# Patient Record
Sex: FEMALE | Race: WHITE | NOT HISPANIC OR LATINO | Employment: FULL TIME | URBAN - METROPOLITAN AREA
[De-identification: names, ages, dates, MRNs, and addresses within clinical notes are randomized per-mention and may not be internally consistent; named-entity substitution may affect disease eponyms.]

---

## 2019-12-09 ENCOUNTER — OFFICE VISIT (OUTPATIENT)
Dept: OBGYN CLINIC | Facility: CLINIC | Age: 44
End: 2019-12-09
Payer: COMMERCIAL

## 2019-12-09 VITALS — DIASTOLIC BLOOD PRESSURE: 80 MMHG | SYSTOLIC BLOOD PRESSURE: 122 MMHG

## 2019-12-09 DIAGNOSIS — N90.89 VULVAR LESION: Primary | ICD-10-CM

## 2019-12-09 PROCEDURE — 88305 TISSUE EXAM BY PATHOLOGIST: CPT | Performed by: PATHOLOGY

## 2019-12-09 PROCEDURE — 99201 PR OFFICE OUTPATIENT NEW 10 MINUTES: CPT | Performed by: STUDENT IN AN ORGANIZED HEALTH CARE EDUCATION/TRAINING PROGRAM

## 2019-12-09 PROCEDURE — 56605 BIOPSY OF VULVA/PERINEUM: CPT | Performed by: STUDENT IN AN ORGANIZED HEALTH CARE EDUCATION/TRAINING PROGRAM

## 2019-12-09 RX ORDER — MEDROXYPROGESTERONE ACETATE 150 MG/ML
150 INJECTION, SUSPENSION INTRAMUSCULAR
COMMUNITY
End: 2020-07-13 | Stop reason: ALTCHOICE

## 2019-12-09 RX ORDER — PHENTERMINE HYDROCHLORIDE 37.5 MG/1
37.5 TABLET ORAL
Refills: 0 | COMMUNITY
Start: 2019-11-13 | End: 2021-11-22

## 2019-12-09 NOTE — PROGRESS NOTES
Biopsy  Date/Time: 2019 3:28 PM  Performed by: Jose Juan Mccurdy MD  Authorized by: Jose uJan Mccurdy MD     Procedure Details - Skin Biopsy:     Biopsy tissue type: skin    Biopsy method: punch biopsy      Body area: Anogenital    Anogenital location:  Vulva    Vaginal Lesion: No      Initial size (mm):  3    Final defect size (mm):  3    Malignancy: malignancy unknown       44 yo  presents as new pt  Sees OBGYN in [place] for annual exams, most recent was in 2019 where she had a normal pap smear  Her OBGYN recommended vulvar biopsy of b/l labial skin changes  On exam,  Pt noted to have brown, raised skin lesions on both labia majora  Pt reports they are mildly irritating, not pruritic, but she does feel they have grown in size  Lesions are the same on both labia and so biopsy only taken from left labia  Two 3 mm punch biopsies completed after lidocaine injected  Hemostasis noted after pressure held  Pt tolerated well  Will follow up by phone

## 2019-12-17 ENCOUNTER — TELEPHONE (OUTPATIENT)
Dept: SURGICAL ONCOLOGY | Facility: CLINIC | Age: 44
End: 2019-12-17

## 2019-12-17 DIAGNOSIS — D07.1 VULVAR INTRAEPITHELIAL NEOPLASIA (VIN) GRADE 3: Primary | ICD-10-CM

## 2019-12-17 NOTE — TELEPHONE ENCOUNTER
New Patient Encounter    New Patient Intake Form   Patient Details:  Christine Holcomb  1975  [id number]    Background Information:  [address] starts by opening a telephone encounter and gathering the following information   Who is calling to schedule? If not self, relationship to patient? self   Referring Provider Ob/gyn  In epic   What is the diagnosis? HGSIL   When was the diagnosis? 12/2019   Is patient aware of diagnosis? Yes   Reason for visit? NP DX   Have you had any testing done? If so: when, where? Yes   Are records in [facility]? yes   Was the patient told to bring a disk? no   Scheduling Information:   Preferred [place]:  [facility]? [place]   Are there any dates/time the patient cannot be seen? no      Miscellaneous: n/a   After completing the above information, please route to Financial Counselor and the appropriate Nurse Navigator for review

## 2019-12-23 ENCOUNTER — CONSULT (OUTPATIENT)
Dept: GYNECOLOGIC ONCOLOGY | Facility: CLINIC | Age: 44
End: 2019-12-23
Payer: COMMERCIAL

## 2019-12-23 VITALS
SYSTOLIC BLOOD PRESSURE: 112 MMHG | HEIGHT: 62 IN | TEMPERATURE: 98.4 F | BODY MASS INDEX: 27.05 KG/M2 | HEART RATE: 88 BPM | DIASTOLIC BLOOD PRESSURE: 84 MMHG | RESPIRATION RATE: 18 BRPM | WEIGHT: 147 LBS

## 2019-12-23 DIAGNOSIS — D07.1 VULVAR INTRAEPITHELIAL NEOPLASIA (VIN) GRADE 3: ICD-10-CM

## 2019-12-23 DIAGNOSIS — D07.1 VIN III (VULVAR INTRAEPITHELIAL NEOPLASIA III): Primary | ICD-10-CM

## 2019-12-23 PROCEDURE — 99244 OFF/OP CNSLTJ NEW/EST MOD 40: CPT | Performed by: OBSTETRICS & GYNECOLOGY

## 2019-12-23 NOTE — ASSESSMENT & PLAN NOTE
Patient is very pleasant 70-year-old white female with biopsy-proven PARRIS 3  She has 2 darkly pigmented areas in the labia majora  There is no obvious sign of malignancy  We have discussed treatment options including excision verses laser verses Aldara  We have recommended simple partial vulvectomy x2 as there is a high-grade nature to this lesion       I have discussed with the patient risks and benefits of the procedure including bleeding requiring transfusion infection damage to local structure in cosmetic change  The patient understands and accepts the risks of surgery and has signed an informed consent  Standard preoperative testing including chest x-ray EKG blood work will be performed given the patient's smoking status  A consent was signed with me stating that the patient fully understands and expects the risks discussed  Additionally we have recommended discontinuation of cigarette smoking

## 2019-12-23 NOTE — H&P (VIEW-ONLY)
Assessment/Plan:    Problem List Items Addressed This Visit        Genitourinary    PARRIS III (vulvar intraepithelial neoplasia III) - Primary     Patient is very pleasant 43-year-old white female with biopsy-proven PARRIS 3  She has 2 darkly pigmented areas in the labia majora  There is no obvious sign of malignancy  We have discussed treatment options including excision verses laser verses Aldara  We have recommended simple partial vulvectomy x2 as there is a high-grade nature to this lesion       I have discussed with the patient risks and benefits of the procedure including bleeding requiring transfusion infection damage to local structure in cosmetic change  The patient understands and accepts the risks of surgery and has signed an informed consent  Standard preoperative testing including chest x-ray EKG blood work will be performed given the patient's smoking status  A consent was signed with me stating that the patient fully understands and expects the risks discussed  Additionally we have recommended discontinuation of cigarette smoking  Relevant Orders    Case request operating room: VULVECTOMY SIMPLE partial X2 (Completed)    Type and screen    Comprehensive metabolic panel    CBC and differential    EKG 12 lead    XR chest pa & lateral      Other Visit Diagnoses     Vulvar intraepithelial neoplasia (PARRIS) grade 3                  CHIEF COMPLAINT: VIN3      Patient ID: Tali Canales is a 45 y o  female  The patient is a very pleasant 43-year-old female who recently underwent a normal Pap smear and annual exam over the summer of 2019 in the [place]  Tt that time she was noted to have some brown spots on the vulva and recommended to see an OBGYN closer to her area    She  went to a [place] [address] for evaluation    Outpatient biopsy was performed with the aforementioned findings:   Final Diagnosis  A  Vulva, Vulvar biopsy, punch biopsy:  - High-grade squamous intraepithelial lesion/vulvar intraepithelial neoplasia grade 3 of usual type     (HSIL/uVIN 3), present at peripheral borders of both biopsies    - No invasive carcinoma identified  The patient notes some intermittent itching of the vulva but no other symptoms  She has seen these brown spots and notice that they are enlarging  Today, the patient is doing well  She denies significant abdominal pain, pelvic pain, nausea, vomiting, constipation, diarrhea, fevers, chills, or vaginal bleeding  Review of Systems   Constitutional: Negative  HENT: Negative  Eyes: Negative  Respiratory: Negative  Cardiovascular: Negative  Gastrointestinal: Negative  Endocrine: Negative  Genitourinary: Positive for enuresis  Musculoskeletal: Negative  Skin: Negative  Neurological: Negative  Hematological: Negative  Psychiatric/Behavioral: Negative  Current Outpatient Medications   Medication Sig Dispense Refill    medroxyPROGESTERone (DEPO-PROVERA) 150 mg/mL injection Inject 150 mg into a muscle every 3 (three) months      norethindrone (AYGESTIN) 5 mg tablet Take 5 mg by mouth daily  3    phentermine (ADIPEX-P) 37 5 MG tablet Take 37 5 mg by mouth daily  0     No current facility-administered medications for this visit  No Known Allergies    Past Medical History:   Diagnosis Date    Urinary tract infection        Past Surgical History:   Procedure Laterality Date    AUGMENTATION BREAST         OB History        2    Para   2    Term   2       0    AB   0    Living   2       SAB   0    TAB   0    Ectopic   0    Multiple   0    Live Births   2                 History reviewed  No pertinent family history      The following portions of the patient's history were reviewed and updated as appropriate: allergies, current medications, past family history, past social history, past surgical history and problem list       Objective:    Blood pressure 112/84, pulse 88, temperature 98 4 °F (36 9 °C), resp  rate 18, height 5' 2" (1 575 m), weight 66 7 kg (147 lb)  Body mass index is 26 89 kg/m²  Physical Exam   Constitutional: She is oriented to person, place, and time  She appears well-developed and well-nourished  HENT:   Head: Normocephalic and atraumatic  Eyes: Pupils are equal, round, and reactive to light  EOM are normal    Neck: Normal range of motion  Neck supple  Cardiovascular: Normal rate, regular rhythm and normal heart sounds  Pulmonary/Chest: Effort normal and breath sounds normal  No respiratory distress  Abdominal: Soft  Bowel sounds are normal  She exhibits no distension and no ascites  There is no tenderness  There is no rigidity, no rebound and no guarding  Genitourinary:   Genitourinary Comments: -Normal external female genitalia there is an oblong shaped area of pigmentation approximately 2 cm by half a cm on the bilateral labia majora  All other areas are unremarkable  , normal Bartholin's and [place]'s glands                  -Normal midline urethral meatus  No lesions notes                  -Bladder without fullness mass or tenderness                  -Vagina without lesion or discharge No significant cystocele or rectocele noted                  -Cervix normal appearing without visible lesions                  -Uterus with normal contour, mobility  No tenderness,                  -Adnexae without  mass or tenderness                  - Anus without fissure of lesion     Musculoskeletal: Normal range of motion  Lymphadenopathy:     She has no cervical adenopathy  She has no axillary adenopathy  Right: No inguinal and no supraclavicular adenopathy present  Left: No inguinal and no supraclavicular adenopathy present  Neurological: She is alert and oriented to person, place, and time  Skin: Skin is warm and dry  Psychiatric: She has a normal mood and affect   Her behavior is normal

## 2019-12-23 NOTE — LETTER
December 23, 2019     [address]  [address] [address]    Patient: Juana Alba   YOB: 1975   Date of Visit: 12/23/2019       Dear Dr Carolina Parra: Thank you for referring Juana Alba to me for evaluation  Below are my notes for this consultation  If you have questions, please do not hesitate to call me  I look forward to following your patient along with you  Sincerely,        Tiffanie Victoria MD        CC: No Recipients  Tiffanie Victoria MD  12/23/2019  3:20 PM  Sign at close encounter  Assessment/Plan:    Problem List Items Addressed This Visit        Genitourinary    PARRIS III (vulvar intraepithelial neoplasia III) - Primary     Patient is very pleasant 74-year-old white female with biopsy-proven PARRIS 3  She has 2 darkly pigmented areas in the labia majora  There is no obvious sign of malignancy  We have discussed treatment options including excision verses laser verses Aldara  We have recommended simple partial vulvectomy x2 as there is a high-grade nature to this lesion       I have discussed with the patient risks and benefits of the procedure including bleeding requiring transfusion infection damage to local structure in cosmetic change  The patient understands and accepts the risks of surgery and has signed an informed consent  Standard preoperative testing including chest x-ray EKG blood work will be performed given the patient's smoking status  A consent was signed with me stating that the patient fully understands and expects the risks discussed  Additionally we have recommended discontinuation of cigarette smoking           Relevant Orders    Case request operating room: VULVECTOMY SIMPLE partial X2 (Completed)    Type and screen    Comprehensive metabolic panel    CBC and differential    EKG 12 lead    XR chest pa & lateral      Other Visit Diagnoses     Vulvar intraepithelial neoplasia (PARRIS) grade 3                  CHIEF COMPLAINT: VIN3      Patient ID: Jud Ugalde is a 45 y o  female  The patient is a very pleasant 68-year-old female who recently underwent a normal Pap smear and annual exam over the summer of 2019 in the [place]  Tt that time she was noted to have some brown spots on the vulva and recommended to see an OBGYN closer to her area    She  went to a [place] [address] for evaluation  Outpatient biopsy was performed with the aforementioned findings:   Final Diagnosis  A  Vulva, Vulvar biopsy, punch biopsy:  - High-grade squamous intraepithelial lesion/vulvar intraepithelial neoplasia grade 3 of usual type     (HSIL/uVIN 3), present at peripheral borders of both biopsies    - No invasive carcinoma identified  The patient notes some intermittent itching of the vulva but no other symptoms  She has seen these brown spots and notice that they are enlarging  Today, the patient is doing well  She denies significant abdominal pain, pelvic pain, nausea, vomiting, constipation, diarrhea, fevers, chills, or vaginal bleeding  Review of Systems   Constitutional: Negative  HENT: Negative  Eyes: Negative  Respiratory: Negative  Cardiovascular: Negative  Gastrointestinal: Negative  Endocrine: Negative  Genitourinary: Positive for enuresis  Musculoskeletal: Negative  Skin: Negative  Neurological: Negative  Hematological: Negative  Psychiatric/Behavioral: Negative  Current Outpatient Medications   Medication Sig Dispense Refill    medroxyPROGESTERone (DEPO-PROVERA) 150 mg/mL injection Inject 150 mg into a muscle every 3 (three) months      norethindrone (AYGESTIN) 5 mg tablet Take 5 mg by mouth daily  3    phentermine (ADIPEX-P) 37 5 MG tablet Take 37 5 mg by mouth daily  0     No current facility-administered medications for this visit          No Known Allergies    Past Medical History:   Diagnosis Date    Urinary tract infection Past Surgical History:   Procedure Laterality Date    AUGMENTATION BREAST         OB History        2    Para   2    Term   2       0    AB   0    Living   2       SAB   0    TAB   0    Ectopic   0    Multiple   0    Live Births   2                 History reviewed  No pertinent family history  The following portions of the patient's history were reviewed and updated as appropriate: allergies, current medications, past family history, past social history, past surgical history and problem list       Objective:    Blood pressure 112/84, pulse 88, temperature 98 4 °F (36 9 °C), resp  rate 18, height 5' 2" (1 575 m), weight 66 7 kg (147 lb)  Body mass index is 26 89 kg/m²  Physical Exam   Constitutional: She is oriented to person, place, and time  She appears well-developed and well-nourished  HENT:   Head: Normocephalic and atraumatic  Eyes: Pupils are equal, round, and reactive to light  EOM are normal    Neck: Normal range of motion  Neck supple  Cardiovascular: Normal rate, regular rhythm and normal heart sounds  Pulmonary/Chest: Effort normal and breath sounds normal  No respiratory distress  Abdominal: Soft  Bowel sounds are normal  She exhibits no distension and no ascites  There is no tenderness  There is no rigidity, no rebound and no guarding  Genitourinary:   Genitourinary Comments: -Normal external female genitalia there is an oblong shaped area of pigmentation approximately 2 cm by half a cm on the bilateral labia majora  All other areas are unremarkable  , normal Bartholin's and [place]'s glands                  -Normal midline urethral meatus  No lesions notes                  -Bladder without fullness mass or tenderness                  -Vagina without lesion or discharge No significant cystocele or rectocele noted                  -Cervix normal appearing without visible lesions                  -Uterus with normal contour, mobility   No tenderness,                  -Adnexae without  mass or tenderness                  - Anus without fissure of lesion     Musculoskeletal: Normal range of motion  Lymphadenopathy:     She has no cervical adenopathy  She has no axillary adenopathy  Right: No inguinal and no supraclavicular adenopathy present  Left: No inguinal and no supraclavicular adenopathy present  Neurological: She is alert and oriented to person, place, and time  Skin: Skin is warm and dry  Psychiatric: She has a normal mood and affect   Her behavior is normal

## 2019-12-23 NOTE — PROGRESS NOTES
Assessment/Plan:    Problem List Items Addressed This Visit        Genitourinary    PARRIS III (vulvar intraepithelial neoplasia III) - Primary     Patient is very pleasant 30-year-old white female with biopsy-proven PARRIS 3  She has 2 darkly pigmented areas in the labia majora  There is no obvious sign of malignancy  We have discussed treatment options including excision verses laser verses Aldara  We have recommended simple partial vulvectomy x2 as there is a high-grade nature to this lesion       I have discussed with the patient risks and benefits of the procedure including bleeding requiring transfusion infection damage to local structure in cosmetic change  The patient understands and accepts the risks of surgery and has signed an informed consent  Standard preoperative testing including chest x-ray EKG blood work will be performed given the patient's smoking status  A consent was signed with me stating that the patient fully understands and expects the risks discussed  Additionally we have recommended discontinuation of cigarette smoking  Relevant Orders    Case request operating room: VULVECTOMY SIMPLE partial X2 (Completed)    Type and screen    Comprehensive metabolic panel    CBC and differential    EKG 12 lead    XR chest pa & lateral      Other Visit Diagnoses     Vulvar intraepithelial neoplasia (PARRIS) grade 3                  CHIEF COMPLAINT: VIN3      Patient ID: Lenny Reece is a 45 y o  female  The patient is a very pleasant 30-year-old female who recently underwent a normal Pap smear and annual exam over the summer of 2019 in the [place]  Tt that time she was noted to have some brown spots on the vulva and recommended to see an OBGYN closer to her area    She  went to a [place] [address] for evaluation    Outpatient biopsy was performed with the aforementioned findings:   Final Diagnosis  A  Vulva, Vulvar biopsy, punch biopsy:  - High-grade squamous intraepithelial lesion/vulvar intraepithelial neoplasia grade 3 of usual type     (HSIL/uVIN 3), present at peripheral borders of both biopsies    - No invasive carcinoma identified  The patient notes some intermittent itching of the vulva but no other symptoms  She has seen these brown spots and notice that they are enlarging  Today, the patient is doing well  She denies significant abdominal pain, pelvic pain, nausea, vomiting, constipation, diarrhea, fevers, chills, or vaginal bleeding  Review of Systems   Constitutional: Negative  HENT: Negative  Eyes: Negative  Respiratory: Negative  Cardiovascular: Negative  Gastrointestinal: Negative  Endocrine: Negative  Genitourinary: Positive for enuresis  Musculoskeletal: Negative  Skin: Negative  Neurological: Negative  Hematological: Negative  Psychiatric/Behavioral: Negative  Current Outpatient Medications   Medication Sig Dispense Refill    medroxyPROGESTERone (DEPO-PROVERA) 150 mg/mL injection Inject 150 mg into a muscle every 3 (three) months      norethindrone (AYGESTIN) 5 mg tablet Take 5 mg by mouth daily  3    phentermine (ADIPEX-P) 37 5 MG tablet Take 37 5 mg by mouth daily  0     No current facility-administered medications for this visit  No Known Allergies    Past Medical History:   Diagnosis Date    Urinary tract infection        Past Surgical History:   Procedure Laterality Date    AUGMENTATION BREAST         OB History        2    Para   2    Term   2       0    AB   0    Living   2       SAB   0    TAB   0    Ectopic   0    Multiple   0    Live Births   2                 History reviewed  No pertinent family history      The following portions of the patient's history were reviewed and updated as appropriate: allergies, current medications, past family history, past social history, past surgical history and problem list       Objective:    Blood pressure 112/84, pulse 88, temperature 98 4 °F (36 9 °C), resp  rate 18, height 5' 2" (1 575 m), weight 66 7 kg (147 lb)  Body mass index is 26 89 kg/m²  Physical Exam   Constitutional: She is oriented to person, place, and time  She appears well-developed and well-nourished  HENT:   Head: Normocephalic and atraumatic  Eyes: Pupils are equal, round, and reactive to light  EOM are normal    Neck: Normal range of motion  Neck supple  Cardiovascular: Normal rate, regular rhythm and normal heart sounds  Pulmonary/Chest: Effort normal and breath sounds normal  No respiratory distress  Abdominal: Soft  Bowel sounds are normal  She exhibits no distension and no ascites  There is no tenderness  There is no rigidity, no rebound and no guarding  Genitourinary:   Genitourinary Comments: -Normal external female genitalia there is an oblong shaped area of pigmentation approximately 2 cm by half a cm on the bilateral labia majora  All other areas are unremarkable  , normal Bartholin's and [place]'s glands                  -Normal midline urethral meatus  No lesions notes                  -Bladder without fullness mass or tenderness                  -Vagina without lesion or discharge No significant cystocele or rectocele noted                  -Cervix normal appearing without visible lesions                  -Uterus with normal contour, mobility  No tenderness,                  -Adnexae without  mass or tenderness                  - Anus without fissure of lesion     Musculoskeletal: Normal range of motion  Lymphadenopathy:     She has no cervical adenopathy  She has no axillary adenopathy  Right: No inguinal and no supraclavicular adenopathy present  Left: No inguinal and no supraclavicular adenopathy present  Neurological: She is alert and oriented to person, place, and time  Skin: Skin is warm and dry  Psychiatric: She has a normal mood and affect   Her behavior is normal

## 2019-12-26 RX ORDER — DIPHENOXYLATE HYDROCHLORIDE AND ATROPINE SULFATE 2.5; .025 MG/1; MG/1
1 TABLET ORAL DAILY
COMMUNITY

## 2019-12-26 NOTE — PRE-PROCEDURE INSTRUCTIONS
Pre-Surgery Instructions:   Medication Instructions    medroxyPROGESTERone (DEPO-PROVERA) 150 mg/mL injection Instructed patient per Anesthesia Guidelines   multivitamin (THERAGRAN) TABS Instructed patient per Anesthesia Guidelines   norethindrone (AYGESTIN) 5 mg tablet Instructed patient per Anesthesia Guidelines   phentermine (ADIPEX-P) 37 5 MG tablet Instructed patient per Anesthesia Guidelines      Pre op,medications and showering instructions reviewed-Patient has hibiclens given to her by surgeon's office

## 2019-12-30 ENCOUNTER — TRANSCRIBE ORDERS (OUTPATIENT)
Dept: ADMINISTRATIVE | Facility: HOSPITAL | Age: 44
End: 2019-12-30

## 2019-12-30 ENCOUNTER — HOSPITAL ENCOUNTER (OUTPATIENT)
Dept: RADIOLOGY | Facility: HOSPITAL | Age: 44
Discharge: HOME | End: 2019-12-30
Attending: OBSTETRICS & GYNECOLOGY
Payer: COMMERCIAL

## 2019-12-30 ENCOUNTER — OFFICE VISIT (OUTPATIENT)
Dept: LAB | Facility: HOSPITAL | Age: 44
End: 2019-12-30
Attending: OBSTETRICS & GYNECOLOGY
Payer: COMMERCIAL

## 2019-12-30 DIAGNOSIS — D07.1 VIN III (VULVAR INTRAEPITHELIAL NEOPLASIA III): ICD-10-CM

## 2019-12-30 DIAGNOSIS — Z01.818 PRE-OP TESTING: Primary | ICD-10-CM

## 2019-12-30 DIAGNOSIS — Z01.818 PRE-OP TESTING: ICD-10-CM

## 2019-12-30 LAB
ABO GROUP BLD: NORMAL
ALBUMIN SERPL BCP-MCNC: 3.8 G/DL (ref 3.5–5)
ALP SERPL-CCNC: 45 U/L (ref 46–116)
ALT SERPL W P-5'-P-CCNC: 24 U/L (ref 12–78)
ANION GAP SERPL CALCULATED.3IONS-SCNC: 9 MMOL/L (ref 4–13)
AST SERPL W P-5'-P-CCNC: 14 U/L (ref 5–45)
ATRIAL RATE: 83 BPM
BASOPHILS # BLD AUTO: 0.05 THOUSANDS/ΜL (ref 0–0.1)
BASOPHILS NFR BLD AUTO: 1 % (ref 0–1)
BILIRUB SERPL-MCNC: 0.3 MG/DL (ref 0.2–1)
BLD GP AB SCN SERPL QL: NEGATIVE
BUN SERPL-MCNC: 17 MG/DL (ref 5–25)
CALCIUM SERPL-MCNC: 8.5 MG/DL (ref 8.3–10.1)
CHLORIDE SERPL-SCNC: 107 MMOL/L (ref 100–108)
CO2 SERPL-SCNC: 27 MMOL/L (ref 21–32)
CREAT SERPL-MCNC: 0.96 MG/DL (ref 0.6–1.3)
EOSINOPHIL # BLD AUTO: 0.15 THOUSAND/ΜL (ref 0–0.61)
EOSINOPHIL NFR BLD AUTO: 3 % (ref 0–6)
ERYTHROCYTE [DISTWIDTH] IN BLOOD BY AUTOMATED COUNT: 12.8 % (ref 11.6–15.1)
GFR SERPL CREATININE-BSD FRML MDRD: 73 ML/MIN/1.73SQ M
GLUCOSE P FAST SERPL-MCNC: 92 MG/DL (ref 65–99)
HCT VFR BLD AUTO: 47.1 % (ref 34.8–46.1)
HGB BLD-MCNC: 15.3 G/DL (ref 11.5–15.4)
IMM GRANULOCYTES # BLD AUTO: 0.01 THOUSAND/UL (ref 0–0.2)
IMM GRANULOCYTES NFR BLD AUTO: 0 % (ref 0–2)
LYMPHOCYTES # BLD AUTO: 2.3 THOUSANDS/ΜL (ref 0.6–4.47)
LYMPHOCYTES NFR BLD AUTO: 40 % (ref 14–44)
MCH RBC QN AUTO: 30.8 PG (ref 26.8–34.3)
MCHC RBC AUTO-ENTMCNC: 32.5 G/DL (ref 31.4–37.4)
MCV RBC AUTO: 95 FL (ref 82–98)
MONOCYTES # BLD AUTO: 0.56 THOUSAND/ΜL (ref 0.17–1.22)
MONOCYTES NFR BLD AUTO: 10 % (ref 4–12)
NEUTROPHILS # BLD AUTO: 2.67 THOUSANDS/ΜL (ref 1.85–7.62)
NEUTS SEG NFR BLD AUTO: 46 % (ref 43–75)
NRBC BLD AUTO-RTO: 0 /100 WBCS
P AXIS: 49 DEGREES
PLATELET # BLD AUTO: 226 THOUSANDS/UL (ref 149–390)
PMV BLD AUTO: 11.6 FL (ref 8.9–12.7)
POTASSIUM SERPL-SCNC: 4.1 MMOL/L (ref 3.5–5.3)
PR INTERVAL: 124 MS
PROT SERPL-MCNC: 7.1 G/DL (ref 6.4–8.2)
QRS AXIS: 84 DEGREES
QRSD INTERVAL: 74 MS
QT INTERVAL: 368 MS
QTC INTERVAL: 432 MS
RBC # BLD AUTO: 4.97 MILLION/UL (ref 3.81–5.12)
RH BLD: POSITIVE
SODIUM SERPL-SCNC: 143 MMOL/L (ref 136–145)
SPECIMEN EXPIRATION DATE: NORMAL
T WAVE AXIS: 51 DEGREES
VENTRICULAR RATE: 83 BPM
WBC # BLD AUTO: 5.74 THOUSAND/UL (ref 4.31–10.16)

## 2019-12-30 PROCEDURE — 80053 COMPREHEN METABOLIC PANEL: CPT

## 2019-12-30 PROCEDURE — 71046 X-RAY EXAM CHEST 2 VIEWS: CPT

## 2019-12-30 PROCEDURE — 86850 RBC ANTIBODY SCREEN: CPT

## 2019-12-30 PROCEDURE — 85025 COMPLETE CBC W/AUTO DIFF WBC: CPT

## 2019-12-30 PROCEDURE — 36415 COLL VENOUS BLD VENIPUNCTURE: CPT

## 2019-12-30 PROCEDURE — 93005 ELECTROCARDIOGRAM TRACING: CPT

## 2019-12-30 PROCEDURE — 86901 BLOOD TYPING SEROLOGIC RH(D): CPT

## 2019-12-30 PROCEDURE — 86900 BLOOD TYPING SEROLOGIC ABO: CPT

## 2019-12-30 PROCEDURE — 93010 ELECTROCARDIOGRAM REPORT: CPT | Performed by: INTERNAL MEDICINE

## 2020-01-09 ENCOUNTER — ANESTHESIA (OUTPATIENT)
Dept: PERIOP | Facility: HOSPITAL | Age: 45
End: 2020-01-09
Payer: COMMERCIAL

## 2020-01-09 ENCOUNTER — HOSPITAL ENCOUNTER (OUTPATIENT)
Facility: HOSPITAL | Age: 45
Setting detail: OUTPATIENT SURGERY
Discharge: HOME | End: 2020-01-09
Attending: OBSTETRICS & GYNECOLOGY | Admitting: OBSTETRICS & GYNECOLOGY
Payer: COMMERCIAL

## 2020-01-09 ENCOUNTER — ANESTHESIA EVENT (OUTPATIENT)
Dept: PERIOP | Facility: HOSPITAL | Age: 45
End: 2020-01-09
Payer: COMMERCIAL

## 2020-01-09 VITALS
HEIGHT: 62 IN | SYSTOLIC BLOOD PRESSURE: 117 MMHG | HEART RATE: 90 BPM | BODY MASS INDEX: 27.05 KG/M2 | DIASTOLIC BLOOD PRESSURE: 76 MMHG | WEIGHT: 147 LBS | OXYGEN SATURATION: 98 % | RESPIRATION RATE: 16 BRPM | TEMPERATURE: 98.1 F

## 2020-01-09 DIAGNOSIS — D07.1 VIN III (VULVAR INTRAEPITHELIAL NEOPLASIA III): ICD-10-CM

## 2020-01-09 LAB
ABO GROUP BLD: NORMAL
RH BLD: POSITIVE

## 2020-01-09 PROCEDURE — 86901 BLOOD TYPING SEROLOGIC RH(D): CPT | Performed by: OBSTETRICS & GYNECOLOGY

## 2020-01-09 PROCEDURE — 88309 TISSUE EXAM BY PATHOLOGIST: CPT | Performed by: PATHOLOGY

## 2020-01-09 PROCEDURE — 86900 BLOOD TYPING SEROLOGIC ABO: CPT | Performed by: OBSTETRICS & GYNECOLOGY

## 2020-01-09 PROCEDURE — 56620 VULVECTOMY SIMPLE PARTIAL: CPT | Performed by: OBSTETRICS & GYNECOLOGY

## 2020-01-09 RX ORDER — KETOROLAC TROMETHAMINE 30 MG/ML
INJECTION, SOLUTION INTRAMUSCULAR; INTRAVENOUS AS NEEDED
Status: DISCONTINUED | OUTPATIENT
Start: 2020-01-09 | End: 2020-01-09 | Stop reason: SURG

## 2020-01-09 RX ORDER — ONDANSETRON 2 MG/ML
INJECTION INTRAMUSCULAR; INTRAVENOUS AS NEEDED
Status: DISCONTINUED | OUTPATIENT
Start: 2020-01-09 | End: 2020-01-09 | Stop reason: SURG

## 2020-01-09 RX ORDER — DEXAMETHASONE SODIUM PHOSPHATE 4 MG/ML
INJECTION, SOLUTION INTRA-ARTICULAR; INTRALESIONAL; INTRAMUSCULAR; INTRAVENOUS; SOFT TISSUE AS NEEDED
Status: DISCONTINUED | OUTPATIENT
Start: 2020-01-09 | End: 2020-01-09 | Stop reason: SURG

## 2020-01-09 RX ORDER — HYDROMORPHONE HCL/PF 1 MG/ML
SYRINGE (ML) INJECTION AS NEEDED
Status: DISCONTINUED | OUTPATIENT
Start: 2020-01-09 | End: 2020-01-09 | Stop reason: SURG

## 2020-01-09 RX ORDER — ACETAMINOPHEN 325 MG/1
650 TABLET ORAL EVERY 6 HOURS PRN
Status: CANCELLED | OUTPATIENT
Start: 2020-01-09

## 2020-01-09 RX ORDER — CEFOXITIN 1 G/1
INJECTION, POWDER, FOR SOLUTION INTRAVENOUS AS NEEDED
Status: DISCONTINUED | OUTPATIENT
Start: 2020-01-09 | End: 2020-01-09 | Stop reason: SURG

## 2020-01-09 RX ORDER — FENTANYL CITRATE/PF 50 MCG/ML
25 SYRINGE (ML) INJECTION
Status: DISCONTINUED | OUTPATIENT
Start: 2020-01-09 | End: 2020-01-09 | Stop reason: HOSPADM

## 2020-01-09 RX ORDER — FENTANYL CITRATE 50 UG/ML
INJECTION, SOLUTION INTRAMUSCULAR; INTRAVENOUS AS NEEDED
Status: DISCONTINUED | OUTPATIENT
Start: 2020-01-09 | End: 2020-01-09 | Stop reason: SURG

## 2020-01-09 RX ORDER — BUPIVACAINE HYDROCHLORIDE 5 MG/ML
INJECTION, SOLUTION PERINEURAL AS NEEDED
Status: DISCONTINUED | OUTPATIENT
Start: 2020-01-09 | End: 2020-01-09 | Stop reason: HOSPADM

## 2020-01-09 RX ORDER — ACETIC ACID 5 %
100 LIQUID (ML) MISCELLANEOUS ONCE
Status: DISCONTINUED | OUTPATIENT
Start: 2020-01-09 | End: 2020-01-09 | Stop reason: HOSPADM

## 2020-01-09 RX ORDER — ONDANSETRON 2 MG/ML
4 INJECTION INTRAMUSCULAR; INTRAVENOUS EVERY 6 HOURS PRN
Status: DISCONTINUED | OUTPATIENT
Start: 2020-01-09 | End: 2020-01-09 | Stop reason: HOSPADM

## 2020-01-09 RX ORDER — IBUPROFEN 600 MG/1
600 TABLET ORAL EVERY 6 HOURS PRN
Status: CANCELLED | OUTPATIENT
Start: 2020-01-09

## 2020-01-09 RX ORDER — OXYCODONE HYDROCHLORIDE 5 MG/1
10 TABLET ORAL EVERY 4 HOURS PRN
Status: CANCELLED | OUTPATIENT
Start: 2020-01-09

## 2020-01-09 RX ORDER — OXYCODONE HYDROCHLORIDE 5 MG/1
TABLET ORAL
Qty: 30 TABLET | Refills: 0 | Status: SHIPPED | OUTPATIENT
Start: 2020-01-09 | End: 2020-02-05 | Stop reason: ALTCHOICE

## 2020-01-09 RX ORDER — PROPOFOL 10 MG/ML
INJECTION, EMULSION INTRAVENOUS AS NEEDED
Status: DISCONTINUED | OUTPATIENT
Start: 2020-01-09 | End: 2020-01-09 | Stop reason: SURG

## 2020-01-09 RX ORDER — OXYCODONE HYDROCHLORIDE 5 MG/1
5 TABLET ORAL EVERY 4 HOURS PRN
Status: CANCELLED | OUTPATIENT
Start: 2020-01-09

## 2020-01-09 RX ORDER — MIDAZOLAM HYDROCHLORIDE 2 MG/2ML
INJECTION, SOLUTION INTRAMUSCULAR; INTRAVENOUS AS NEEDED
Status: DISCONTINUED | OUTPATIENT
Start: 2020-01-09 | End: 2020-01-09 | Stop reason: SURG

## 2020-01-09 RX ORDER — SODIUM CHLORIDE, SODIUM LACTATE, POTASSIUM CHLORIDE, CALCIUM CHLORIDE 600; 310; 30; 20 MG/100ML; MG/100ML; MG/100ML; MG/100ML
INJECTION, SOLUTION INTRAVENOUS CONTINUOUS PRN
Status: DISCONTINUED | OUTPATIENT
Start: 2020-01-09 | End: 2020-01-09 | Stop reason: SURG

## 2020-01-09 RX ORDER — ONDANSETRON 2 MG/ML
4 INJECTION INTRAMUSCULAR; INTRAVENOUS ONCE AS NEEDED
Status: DISCONTINUED | OUTPATIENT
Start: 2020-01-09 | End: 2020-01-09 | Stop reason: HOSPADM

## 2020-01-09 RX ADMIN — HYDROMORPHONE HYDROCHLORIDE 0.5 MG: 1 INJECTION, SOLUTION INTRAMUSCULAR; INTRAVENOUS; SUBCUTANEOUS at 13:23

## 2020-01-09 RX ADMIN — FENTANYL CITRATE 50 MCG: 50 INJECTION INTRAMUSCULAR; INTRAVENOUS at 13:12

## 2020-01-09 RX ADMIN — SODIUM CHLORIDE, SODIUM LACTATE, POTASSIUM CHLORIDE, AND CALCIUM CHLORIDE: .6; .31; .03; .02 INJECTION, SOLUTION INTRAVENOUS at 13:04

## 2020-01-09 RX ADMIN — MIDAZOLAM HYDROCHLORIDE 2 MG: 1 INJECTION, SOLUTION INTRAMUSCULAR; INTRAVENOUS at 13:08

## 2020-01-09 RX ADMIN — FENTANYL CITRATE 50 MCG: 50 INJECTION INTRAMUSCULAR; INTRAVENOUS at 13:09

## 2020-01-09 RX ADMIN — PROPOFOL 200 MG: 10 INJECTION, EMULSION INTRAVENOUS at 13:09

## 2020-01-09 RX ADMIN — CEFOXITIN 1000 MG: 1 INJECTION, POWDER, FOR SOLUTION INTRAVENOUS at 13:55

## 2020-01-09 RX ADMIN — ONDANSETRON 4 MG: 2 INJECTION INTRAMUSCULAR; INTRAVENOUS at 13:17

## 2020-01-09 RX ADMIN — PHENYLEPHRINE HYDROCHLORIDE 100 MCG: 10 INJECTION INTRAVENOUS at 13:19

## 2020-01-09 RX ADMIN — DEXAMETHASONE SODIUM PHOSPHATE 4 MG: 4 INJECTION, SOLUTION INTRAMUSCULAR; INTRAVENOUS at 13:17

## 2020-01-09 RX ADMIN — KETOROLAC TROMETHAMINE 30 MG: 30 INJECTION, SOLUTION INTRAMUSCULAR at 13:59

## 2020-01-09 NOTE — DISCHARGE INSTRUCTIONS
Gynecology Discharge Instructions  1  No heavy lifting more than one full gallon of milk (about 8 lbs)  2  Nothing in the vagina for 6 weeks and until cleared by Dr Abdiaziz King  3  No tub baths or swimming  4  You may take stairs one at a time, touching each step with both feet for the first few days, then as tolerated  5  Call the office for fever greater than 100 4, heavy vaginal bleeding or increasing pain  6  Take Colace twice daily to keep your stool soft  7  No Driving until pain free and no longer requiring narcotic pain medications  8  Activity as tolerated     Post Op Prescriptions  You were given Rx for Roxicodone 5mg  You may take over the counter Ibuprofen every 6 hours to relieve pain, especially cramping and mild pain  If you have additional moderate to severe pain you may take 1-2 tabs of roxicodone every 4-6 hours       You may also take Colace (Docusate Sodium) 100mg 2x daily   This is available over the counter and is recommended to help keep your stool soft in order to prevent straining, especially if you are taking narcotic pain medication       If you have any questions regarding your prescriptions please call your doctor

## 2020-01-09 NOTE — INTERVAL H&P NOTE
H&P reviewed  After examining the patient I find no changes in the patients condition since the H&P had been written   Plan bilateral simple vulvectomy    Vitals:    01/09/20 1236   BP: 101/63   Pulse: 89   Resp: 20   Temp: 97 9 °F (36 6 °C)   SpO2: 100%

## 2020-01-09 NOTE — ANESTHESIA PREPROCEDURE EVALUATION
Review of Systems/Medical History  Patient summary reviewed  Chart reviewed  No history of anesthetic complications     Cardiovascular  Exercise tolerance (METS): >4,     Pulmonary  Smoker cigarette smoker  , Tobacco cessation counseling given Cumulative Pack Years: 15,        GI/Hepatic            Endo/Other  History of thyroid disease , hypothyroidism,      GYN      Comment: PARRIS III (vulvar intraepithelial neoplasia III)     Hematology   Musculoskeletal       Neurology   Psychology           Physical Exam    Airway    Mallampati score: II  TM Distance: >3 FB  Neck ROM: full     Dental   No notable dental hx     Cardiovascular  Cardiovascular exam normal    Pulmonary  Pulmonary exam normal     Other Findings        Anesthesia Plan  ASA Score- 2     Anesthesia Type- general with ASA Monitors  Additional Monitors:   Airway Plan:         Plan Factors-  Patient smoked on day of surgery  Induction- intravenous  Postoperative Plan- Plan for postoperative opioid use  Informed Consent- Anesthetic plan and risks discussed with patient

## 2020-01-09 NOTE — OP NOTE
OPERATIVE REPORT  PATIENT NAME: Fabian Sosa    :  1975  MRN: [id number]  Pt Location: [facility] OR ROOM 02    SURGERY DATE: 2020    Surgeon(s) and Role:     * Zahida Rice MD - Primary     * Karen Recinos MD - Assisting    Preop Diagnosis:  PARRIS III (vulvar intraepithelial neoplasia III) [D07 1]    Post-Op Diagnosis Codes:     * PARRIS III (vulvar intraepithelial neoplasia III) [D07 1]    Procedure(s) (LRB):  SIMPLE VULVECTOMY (Bilateral)    Specimen(s):  ID Type Source Tests Collected by Time Destination   1 : Left Tissue Vulva TISSUE EXAM Zahida Rice MD 2020 1318    2 : Right Tissue Vulva TISSUE EXAM Zahida Rcie MD 2020 1318        Estimated Blood Loss:   Minimal    Drains:  * No LDAs found *    Anesthesia Type:   Choice    Operative Indications:  PARRIS III (vulvar intraepithelial neoplasia III) [D07 1]      Operative Findings:  Bilateral labia majora pigmented lesions measuring approximately 2 cm by half a cm resected in total    Complications:   None    Procedure and Technique:    Patient was placed in dorsal lithotomy position and prepped and draped in the usual sterile fashion  A time-out procedure was performed  The previously mentioned findings were noted  The bladder was catheterized without difficulty  The pigmented lesions were identified and an elliptical incision line was placed surrounding it with a sterile marker such that the incision would be 1 cm surrounding the abnormal tissue  The incision was created with a knife in the subcutaneous tissue was removed with a knife  All bleeding areas were cauterized with electrocautery  The skin incisions were reapproximated with interrupted horizontal mattress sutures of 2-0 Vicryl  This was performed bilaterally  The patient tolerated procedure without complication sponge instrument counts were correct prior to closure and hemostasis was assured prior to closure     I was present for the entire procedure    Patient Disposition:  PACU     SIGNATURE: Silke Kamara MD  DATE: January 9, 2020  TIME: 1:59 PM

## 2020-01-13 ENCOUNTER — TELEPHONE (OUTPATIENT)
Dept: GYNECOLOGIC ONCOLOGY | Facility: CLINIC | Age: 45
End: 2020-01-13

## 2020-01-21 NOTE — PROGRESS NOTES
Assessment/Plan:    Problem List Items Addressed This Visit        Genitourinary    PARRIS III (vulvar intraepithelial neoplasia III) - Primary     Patient is healing well from surgery  There is no evidence of superimposed bacterial infection  The wound is healing well  With regard to the pathology report and the margins are negative and the entirety of the PARRIS was removed  No further treatment is necessary  We will see the patient back in 2 weeks for to assure that things are healing well and then we will see her back every 6 months for 3 separate visits  CHIEF COMPLAINT:  Postoperative evaluation bilateral simple partial vulvectomy          Patient ID: Baron Go is a 45 y o  female  Patient is a very pleasant 51-year-old white female history of biopsy-proven PARRIS 3  She has recently undergone bilateral simple partial vulvectomy without difficulty  She presents today in follow-up  Pathology report reveals bilateral PARRIS 3 with negative margins    Postoperatively the patient has done well with the exception of some vulvar discomfort  She is irrigating the area 4-5 times per day  She notices a mild odor  She has no other complaints  She notes normal bowel and bladder function      The following portions of the patient's history were reviewed and updated as appropriate: allergies, current medications, past family history, past social history, past surgical history and problem list     Review of Systems   Constitutional: Negative  HENT: Negative  Eyes: Negative  Respiratory: Negative  Cardiovascular: Negative  Gastrointestinal: Negative  Endocrine: Negative  Genitourinary: Positive for vaginal pain  Musculoskeletal: Negative  Skin: Negative  Neurological: Negative  Hematological: Negative  Psychiatric/Behavioral: Negative          Current Outpatient Medications   Medication Sig Dispense Refill    medroxyPROGESTERone (DEPO-PROVERA) 150 mg/mL injection Inject 150 mg into a muscle every 3 (three) months      multivitamin (THERAGRAN) TABS Take 1 tablet by mouth daily      norethindrone (AYGESTIN) 5 mg tablet Take 5 mg by mouth daily in the early morning Takes 1/2 pill every AM  3    oxyCODONE (ROXICODONE) 5 mg immediate release tablet 1 -2 tabs by mouth every 4-6 hour as needed (Patient not taking: Reported on 1/22/2020) 30 tablet 0    phentermine (ADIPEX-P) 37 5 MG tablet Take 37 5 mg by mouth daily in the early morning Takes 1/2 tablet daily every AM  0     No current facility-administered medications for this visit  Objective:    Blood pressure 122/86, pulse 90, temperature 98 2 °F (36 8 °C), temperature source Oral   There is no height or weight on file to calculate BMI  There is no height or weight on file to calculate BSA  Physical Exam   Constitutional: She is oriented to person, place, and time  She appears well-developed and well-nourished  HENT:   Head: Normocephalic and atraumatic  Eyes: Pupils are equal, round, and reactive to light  EOM are normal    Neck: Normal range of motion  Neck supple  Cardiovascular: Normal rate, regular rhythm and normal heart sounds  Pulmonary/Chest: Effort normal and breath sounds normal  No respiratory distress  Abdominal: Soft  Bowel sounds are normal  She exhibits no distension and no ascites  There is no tenderness  There is no rigidity, no rebound and no guarding  Genitourinary:   Genitourinary Comments: External female genitalia status post bilateral simple partial vulvectomy incision sites intact  Minimal separation in midportion of right vulvar incision   Musculoskeletal: Normal range of motion  Lymphadenopathy:     She has no cervical adenopathy  She has no axillary adenopathy  Right: No inguinal and no supraclavicular adenopathy present  Left: No inguinal and no supraclavicular adenopathy present     Neurological: She is alert and oriented to person, place, and time  Skin: Skin is warm and dry  Psychiatric: She has a normal mood and affect  Her behavior is normal    Nursing note and vitals reviewed        No results found for:   Lab Results   Component Value Date    K 4 1 12/30/2019     12/30/2019    CO2 27 12/30/2019    BUN 17 12/30/2019    CREATININE 0 96 12/30/2019    GLUF 92 12/30/2019    CALCIUM 8 5 12/30/2019    AST 14 12/30/2019    ALT 24 12/30/2019    ALKPHOS 45 (L) 12/30/2019    EGFR 73 12/30/2019     Lab Results   Component Value Date    WBC 5 74 12/30/2019    HGB 15 3 12/30/2019    HCT 47 1 (H) 12/30/2019    MCV 95 12/30/2019     12/30/2019     Lab Results   Component Value Date    NEUTROABS 2 67 12/30/2019

## 2020-01-22 ENCOUNTER — OFFICE VISIT (OUTPATIENT)
Dept: GYNECOLOGIC ONCOLOGY | Facility: CLINIC | Age: 45
End: 2020-01-22

## 2020-01-22 VITALS — HEART RATE: 90 BPM | TEMPERATURE: 98.2 F | SYSTOLIC BLOOD PRESSURE: 122 MMHG | DIASTOLIC BLOOD PRESSURE: 86 MMHG

## 2020-01-22 DIAGNOSIS — D07.1 VIN III (VULVAR INTRAEPITHELIAL NEOPLASIA III): Primary | ICD-10-CM

## 2020-01-22 PROCEDURE — 99024 POSTOP FOLLOW-UP VISIT: CPT | Performed by: OBSTETRICS & GYNECOLOGY

## 2020-01-22 NOTE — ASSESSMENT & PLAN NOTE
Patient is healing well from surgery  There is no evidence of superimposed bacterial infection  The wound is healing well  With regard to the pathology report and the margins are negative and the entirety of the PARRIS was removed  No further treatment is necessary  We will see the patient back in 2 weeks for to assure that things are healing well and then we will see her back every 6 months for 3 separate visits

## 2020-01-22 NOTE — LETTER
January 22, 2020     [address]  [address] [address]    Patient: Anisha Sood   YOB: 1975   Date of Visit: 1/22/2020       Dear Dr Jennifer Hernandez: Thank you for referring Anisha Sood to me for evaluation  Below are my notes for this consultation  If you have questions, please do not hesitate to call me  I look forward to following your patient along with you  Sincerely,        Beatrice Ng MD        CC: No Recipients  Beatrice Ng MD  1/22/2020  1:33 PM  Sign at close encounter  Assessment/Plan:    Problem List Items Addressed This Visit        Genitourinary    PARRIS III (vulvar intraepithelial neoplasia III) - Primary     Patient is healing well from surgery  There is no evidence of superimposed bacterial infection  The wound is healing well  With regard to the pathology report and the margins are negative and the entirety of the PARRIS was removed  No further treatment is necessary  We will see the patient back in 2 weeks for to assure that things are healing well and then we will see her back every 6 months for 3 separate visits  CHIEF COMPLAINT:  Postoperative evaluation bilateral simple partial vulvectomy          Patient ID: Anisha Sood is a 45 y o  female  Patient is a very pleasant 62-year-old white female history of biopsy-proven PARRIS 3  She has recently undergone bilateral simple partial vulvectomy without difficulty  She presents today in follow-up  Pathology report reveals bilateral PARRIS 3 with negative margins    Postoperatively the patient has done well with the exception of some vulvar discomfort  She is irrigating the area 4-5 times per day  She notices a mild odor  She has no other complaints    She notes normal bowel and bladder function      The following portions of the patient's history were reviewed and updated as appropriate: allergies, current medications, past family history, past social history, past surgical history and problem list     Review of Systems   Constitutional: Negative  HENT: Negative  Eyes: Negative  Respiratory: Negative  Cardiovascular: Negative  Gastrointestinal: Negative  Endocrine: Negative  Genitourinary: Positive for vaginal pain  Musculoskeletal: Negative  Skin: Negative  Neurological: Negative  Hematological: Negative  Psychiatric/Behavioral: Negative  Current Outpatient Medications   Medication Sig Dispense Refill    medroxyPROGESTERone (DEPO-PROVERA) 150 mg/mL injection Inject 150 mg into a muscle every 3 (three) months      multivitamin (THERAGRAN) TABS Take 1 tablet by mouth daily      norethindrone (AYGESTIN) 5 mg tablet Take 5 mg by mouth daily in the early morning Takes 1/2 pill every AM  3    oxyCODONE (ROXICODONE) 5 mg immediate release tablet 1 -2 tabs by mouth every 4-6 hour as needed (Patient not taking: Reported on 1/22/2020) 30 tablet 0    phentermine (ADIPEX-P) 37 5 MG tablet Take 37 5 mg by mouth daily in the early morning Takes 1/2 tablet daily every AM  0     No current facility-administered medications for this visit  Objective:    Blood pressure 122/86, pulse 90, temperature 98 2 °F (36 8 °C), temperature source Oral   There is no height or weight on file to calculate BMI  There is no height or weight on file to calculate BSA  Physical Exam   Constitutional: She is oriented to person, place, and time  She appears well-developed and well-nourished  HENT:   Head: Normocephalic and atraumatic  Eyes: Pupils are equal, round, and reactive to light  EOM are normal    Neck: Normal range of motion  Neck supple  Cardiovascular: Normal rate, regular rhythm and normal heart sounds  Pulmonary/Chest: Effort normal and breath sounds normal  No respiratory distress  Abdominal: Soft  Bowel sounds are normal  She exhibits no distension and no ascites   There is no tenderness  There is no rigidity, no rebound and no guarding  Genitourinary:   Genitourinary Comments: External female genitalia status post bilateral simple partial vulvectomy incision sites intact  Minimal separation in midportion of right vulvar incision   Musculoskeletal: Normal range of motion  Lymphadenopathy:     She has no cervical adenopathy  She has no axillary adenopathy  Right: No inguinal and no supraclavicular adenopathy present  Left: No inguinal and no supraclavicular adenopathy present  Neurological: She is alert and oriented to person, place, and time  Skin: Skin is warm and dry  Psychiatric: She has a normal mood and affect  Her behavior is normal    Nursing note and vitals reviewed        No results found for:   Lab Results   Component Value Date    K 4 1 12/30/2019     12/30/2019    CO2 27 12/30/2019    BUN 17 12/30/2019    CREATININE 0 96 12/30/2019    GLUF 92 12/30/2019    CALCIUM 8 5 12/30/2019    AST 14 12/30/2019    ALT 24 12/30/2019    ALKPHOS 45 (L) 12/30/2019    EGFR 73 12/30/2019     Lab Results   Component Value Date    WBC 5 74 12/30/2019    HGB 15 3 12/30/2019    HCT 47 1 (H) 12/30/2019    MCV 95 12/30/2019     12/30/2019     Lab Results   Component Value Date    NEUTROABS 2 67 12/30/2019

## 2020-02-05 ENCOUNTER — OFFICE VISIT (OUTPATIENT)
Dept: GYNECOLOGIC ONCOLOGY | Facility: CLINIC | Age: 45
End: 2020-02-05

## 2020-02-05 VITALS
HEART RATE: 87 BPM | WEIGHT: 147 LBS | RESPIRATION RATE: 18 BRPM | SYSTOLIC BLOOD PRESSURE: 120 MMHG | TEMPERATURE: 98.4 F | HEIGHT: 62 IN | DIASTOLIC BLOOD PRESSURE: 80 MMHG | BODY MASS INDEX: 27.05 KG/M2

## 2020-02-05 DIAGNOSIS — D07.1 VIN III (VULVAR INTRAEPITHELIAL NEOPLASIA III): Primary | ICD-10-CM

## 2020-02-05 PROCEDURE — 99024 POSTOP FOLLOW-UP VISIT: CPT | Performed by: OBSTETRICS & GYNECOLOGY

## 2020-02-05 NOTE — ASSESSMENT & PLAN NOTE
Patient is stable postoperatively  Her margins are negative  She continues to have pain which will require her to be off work for several more weeks  We have given her note to return to work on March 4, 2020  She will follow up with us in 6 months to begin her surveillance

## 2020-02-05 NOTE — LETTER
February 5, 2020     Patient: Constantin Saunders   YOB: 1975   Date of Visit: 2/5/2020       To Whom it May Concern:    Constantin Saunders is under my professional care  She was seen in my office on 2/5/2020  She may return to work on March 4, 2020  If you have any questions or concerns, please don't hesitate to call           Sincerely,          Alfonso Zheng MD        CC: No Recipients

## 2020-02-05 NOTE — PROGRESS NOTES
Assessment/Plan:    Problem List Items Addressed This Visit        Genitourinary    PARRIS III (vulvar intraepithelial neoplasia III) - Primary     Patient is stable postoperatively  Her margins are negative  She continues to have pain which will require her to be off work for several more weeks  We have given her note to return to work on March 4, 2020  She will follow up with us in 6 months to begin her surveillance  CHIEF COMPLAINT:  Postop check vulvectomy for PARRIS 3        Patient ID: Gaurav Limon is a 45 y o  female  Patient presents today for 2nd postop check from bilateral simple partial vulvectomy for PARRIS 3 with negative margins  Overall she is doing well  She continues to have significant discomfort though and has difficulty sitting  She is doing better standing in laying down  She is using over-the-counter pain medications  She has no drainage discharge or erythema  The following portions of the patient's history were reviewed and updated as appropriate: allergies, current medications, past family history, past social history, past surgical history and problem list     Review of Systems   Constitutional: Negative  HENT: Negative  Eyes: Negative  Respiratory: Negative  Cardiovascular: Negative  Gastrointestinal: Negative  Endocrine: Negative  Genitourinary: Positive for vaginal pain  Musculoskeletal: Negative  Skin: Negative  Neurological: Negative  Hematological: Negative  Psychiatric/Behavioral: Negative          Current Outpatient Medications   Medication Sig Dispense Refill    medroxyPROGESTERone (DEPO-PROVERA) 150 mg/mL injection Inject 150 mg into a muscle every 3 (three) months      multivitamin (THERAGRAN) TABS Take 1 tablet by mouth daily      norethindrone (AYGESTIN) 5 mg tablet Take 5 mg by mouth daily in the early morning Takes 1/2 pill every AM  3    phentermine (ADIPEX-P) 37 5 MG tablet Take 37 5 mg by mouth daily in the early morning Takes 1/2 tablet daily every AM  0     No current facility-administered medications for this visit  Objective:    Blood pressure 120/80, pulse 87, temperature 98 4 °F (36 9 °C), resp  rate 18, height 5' 2" (1 575 m), weight 66 7 kg (147 lb)  Body mass index is 26 89 kg/m²  Body surface area is 1 68 meters squared  Physical Exam   Constitutional: She is oriented to person, place, and time  She appears well-developed and well-nourished  HENT:   Head: Normocephalic and atraumatic  Eyes: EOM are normal    Neck: Normal range of motion  Neck supple  No thyromegaly present  Cardiovascular: Normal rate, regular rhythm and normal heart sounds  Pulmonary/Chest: Effort normal and breath sounds normal    Abdominal: Soft  Bowel sounds are normal    Well healed laparoscopic incisions  Genitourinary:   Genitourinary Comments: -Normal external female genitalia with healing bilateral incisions sutures still visible, normal [place]'s and [place]'s glands                  -Normal midline urethral meatus  No lesions notes                       Musculoskeletal: Normal range of motion  Lymphadenopathy:     She has no cervical adenopathy  Neurological: She is alert and oriented to person, place, and time  Skin: Skin is warm and dry  Psychiatric: She has a normal mood and affect   Her behavior is normal        No results found for:   Lab Results   Component Value Date    K 4 1 12/30/2019     12/30/2019    CO2 27 12/30/2019    BUN 17 12/30/2019    CREATININE 0 96 12/30/2019    GLUF 92 12/30/2019    CALCIUM 8 5 12/30/2019    AST 14 12/30/2019    ALT 24 12/30/2019    ALKPHOS 45 (L) 12/30/2019    EGFR 73 12/30/2019     Lab Results   Component Value Date    WBC 5 74 12/30/2019    HGB 15 3 12/30/2019    HCT 47 1 (H) 12/30/2019    MCV 95 12/30/2019     12/30/2019     Lab Results   Component Value Date    NEUTROABS 2 67 12/30/2019

## 2020-03-04 ENCOUNTER — OFFICE VISIT (OUTPATIENT)
Dept: OBGYN CLINIC | Facility: CLINIC | Age: 45
End: 2020-03-04
Payer: COMMERCIAL

## 2020-03-04 VITALS — WEIGHT: 157.4 LBS | SYSTOLIC BLOOD PRESSURE: 126 MMHG | BODY MASS INDEX: 28.79 KG/M2 | DIASTOLIC BLOOD PRESSURE: 84 MMHG

## 2020-03-04 DIAGNOSIS — Z12.31 ENCOUNTER FOR SCREENING MAMMOGRAM FOR MALIGNANT NEOPLASM OF BREAST: Primary | ICD-10-CM

## 2020-03-04 PROCEDURE — 99213 OFFICE O/P EST LOW 20 MIN: CPT | Performed by: STUDENT IN AN ORGANIZED HEALTH CARE EDUCATION/TRAINING PROGRAM

## 2020-03-04 NOTE — PROGRESS NOTES
Assessment/Plan:       46 yo  who would like nexplanon for contraception  Discussed multiple contraception options  Counseled about possibility of irregular bleeding on nexplanon  She would like to proceed  Will order and call her to schedule ASAP  Encounter for screening mammogram for malignant neoplasm of breast  -     Mammo screening bilateral w cad; Future          Subjective:     Patient ID: Janeen Crawley is a 45 y o  female  Onelia Singleton is a 46 yo  who is s/p simple vulvectomy for PARRIS 3 on 2020  Pt reports she is healing well and pain has improved significantly  She presents today to discuss contraception options  She previously used depo-provera but missed her last shot  She was overall happy with it and denies side effects including weight gain  She had occasional spotting which did not bother her and possibly increase in facial hair  She would like to discuss other options  She had an IUD in the past which fell out  She has not been sexually active for several weeks due to her surgery  Review of Systems   Constitutional: Negative  Genitourinary: Positive for vaginal pain  Psychiatric/Behavioral: Negative  Objective:     Physical Exam   Constitutional: She appears well-nourished  Pulmonary/Chest: Effort normal    Musculoskeletal: Normal range of motion  Neurological: She is alert  Psychiatric: She has a normal mood and affect

## 2020-03-05 ENCOUNTER — TELEPHONE (OUTPATIENT)
Dept: OBGYN CLINIC | Facility: CLINIC | Age: 45
End: 2020-03-05

## 2020-03-05 NOTE — TELEPHONE ENCOUNTER
Pt would like a Nexplanon  Spoke with [employer]  Pt is covered 100%, no co-pay, no deductible  Pt will schedule insertion in [facility]

## 2020-03-09 NOTE — TELEPHONE ENCOUNTER
Buy and bill Nexplanon labeled and will be brought to [facility] office  Pt can be scheduled for insertion

## 2020-03-11 NOTE — TELEPHONE ENCOUNTER
Pt is scheduled with Dr Arthur Marie for her Sukhjinder Nelsonffe insertion on Wednesday, March 25th @ 11:40am   Pt is requesting to be placed on the wait list for something sooner   cv

## 2020-03-18 ENCOUNTER — PROCEDURE VISIT (OUTPATIENT)
Dept: OBGYN CLINIC | Facility: CLINIC | Age: 45
End: 2020-03-18
Payer: COMMERCIAL

## 2020-03-18 VITALS — DIASTOLIC BLOOD PRESSURE: 80 MMHG | BODY MASS INDEX: 28.9 KG/M2 | SYSTOLIC BLOOD PRESSURE: 124 MMHG | WEIGHT: 158 LBS

## 2020-03-18 DIAGNOSIS — Z30.017 NEXPLANON INSERTION: ICD-10-CM

## 2020-03-18 DIAGNOSIS — Z32.02 URINE PREGNANCY TEST NEGATIVE: Primary | ICD-10-CM

## 2020-03-18 LAB — SL AMB POCT URINE HCG: NEGATIVE

## 2020-03-18 PROCEDURE — 11981 INSERTION DRUG DLVR IMPLANT: CPT | Performed by: NURSE PRACTITIONER

## 2020-03-18 PROCEDURE — 81025 URINE PREGNANCY TEST: CPT | Performed by: NURSE PRACTITIONER

## 2020-03-18 NOTE — TELEPHONE ENCOUNTER
Pt is scheduled with Dr Guillermo Hurtado today, March 18th @ 3:20pm for her NEXPLANON insertion    cv

## 2020-03-18 NOTE — PATIENT INSTRUCTIONS
Etonogestrel (Implant)   Etonogestrel (e-toe-racquel-ADELINA-trel)  Prevents pregnancy  Brand Name(s): Nexplanon   There may be other brand names for this medicine  When This Medicine Should Not Be Used: This medicine is not right for everyone  You should not receive it if you had an allergic reaction to etonogestrel, or if you are pregnant  Do not use it if you have breast cancer, heart disease, liver disease, or a history of blood clots (such as deep vein thrombosis, pulmonary embolism, or stroke)  How to Use This Medicine:   Implant  · A nurse or other trained health professional will give you this medicine  · This medicine is an implant  It will be surgically placed under the skin of your upper, inner arm  · Gently press your fingertips over the skin where this medicine was inserted  You should be able to feel the implant  · You might have to use another form of birth control for 7 days after the implant is inserted  Your doctor will tell you if this is needed  · Your doctor can remove the implant at any time if you want to stop using this medicine  The implant must be removed after 3 years, but you may have a new one inserted if you still want to use this form of birth control  · Read and follow the patient instructions that come with this medicine  Talk to your doctor or pharmacist if you have any questions  Drugs and Foods to Avoid:   Ask your doctor or pharmacist before using any other medicine, including over-the-counter medicines, vitamins, and herbal products  · Some foods and medicines can affect how etonogestrel works  Tell your doctor if you are using any of the following:  ¨ Bosentan, carbamazepine, cyclosporine, felbamate, griseofulvin, itraconazole, ketoconazole, lamotrigine, oxcarbazepine, phenobarbital, phenytoin, rifampin, Shavon wort, topiramate  ¨ Medicine for HIV/AIDS  Warnings While Using This Medicine:   · Tell your doctor right away if you think you become pregnant   The implant will need to be removed  · Tell your doctor if you have cancer, blood circulation problems, high blood pressure, or kidney disease, or if you smoke  Tell your doctor if you are breastfeeding, or if you have recently given birth  Also tell your doctor if you have diabetes or prediabetes, high cholesterol, or a history of depression  · This medicine may cause the following problems:  ¨ Ectopic (tubal) pregnancy  ¨ Cysts in the ovaries  ¨ Possible risk of breast cancer  ¨ Higher risk of heart attack, stroke, or blood clots  ¨ Liver cancers or tumors  ¨ High blood pressure  · This medicine may change your usual menstrual cycle  You might have irregular bleeding, or your periods may be lighter, shorter, heavier, or longer  You might not have a period in some cycles  However, call your doctor if you think you are pregnant or if you have severe pain or changes that worry you  · This medicine will not protect you from HIV/AIDS or other sexually transmitted diseases  · You might need to have the implant removed if you will be inactive for a period of time, such as after major surgery, because of the risk of blood clots  · Tell any doctor or dentist who treats you that you are using this medicine  This medicine may affect certain medical test results  · Your doctor will check your progress and the effects of this medicine at regular visits  Keep all appointments  · Keep all medicine out of the reach of children  Never share your medicine with anyone    Possible Side Effects While Using This Medicine:   Call your doctor right away if you notice any of these side effects:  · Allergic reaction: Itching or hives, swelling in your face or hands, swelling or tingling in your mouth or throat, chest tightness, trouble breathing  · Chest pain, trouble breathing, or coughing up blood  · Dark urine or pale stools, nausea, vomiting, loss of appetite, stomach pain, yellow skin or eyes  · Double vision or other trouble seeing  · Numbness or weakness on one side of your body, sudden or severe headache, problems with vision, speech, or walking  · Pain in your lower leg (calf)  · Severe or ongoing pain, tingling, bleeding, bruising, redness, itching, or swelling where the implant is placed  · Unusual or severe pain in your abdomen  · Unusual or unexpected vaginal bleeding or heavy bleeding  If you notice these less serious side effects, talk with your doctor:   · Acne or pimples  · Mild headache  · Mild pain, tingling, bleeding, bruising, redness, itching, or swelling where the implant is placed  · Mood changes  · Weight gain  If you notice other side effects that you think are caused by this medicine, tell your doctor  Call your doctor for medical advice about side effects  You may report side effects to FDA at 1-970-FDA-7831  © 2017 [employer] Information is for End User's use only and may not be sold, redistributed or otherwise used for commercial purposes  The above information is an  only  It is not intended as medical advice for individual conditions or treatments  Talk to your doctor, nurse or pharmacist before following any medical regimen to see if it is safe and effective for you

## 2020-03-18 NOTE — PROGRESS NOTES
Remove and insert drug implant  Date/Time: 3/18/2020 2:07 PM  Performed by: EFRAIN Pond  Authorized by: EFRAIN Pond     Consent:     Consent obtained:  Written    Consent given by:  Patient    Procedural risks discussed:  Bleeding and infection    Patient questions answered: yes      Patient agrees, verbalizes understanding, and wants to proceed: yes      Educational handouts given: yes      Instructions and paperwork completed: yes    Indication:     Indication: Insertion of non-biodegradable drug delivery implant    Pre-procedure:     Pre-procedure timeout performed: yes      Prepped with: povidone-iodine      Local anesthetic:  Lidocaine with epinephrine    The site was cleaned and prepped in a sterile fashion: yes    Procedure:     Procedure: Insertion    Small stab incision was made in arm: yes      Left/right:  Left    Visualization of implant was obtained: yes      Visualization of notch in stylet and palpation of device: yes      Palpation confirms placement by provider and patient: yes      Site was closed with steri-strips and pressure bandage applied: yes    Comments:      Return for menses check in 3 months, call with any issues    Advised to repeat PT in 2 weeks since she had sex last night and has not had menses since January d/t previous DMPA  PT neg today  Patient verbalized understanding

## 2020-06-22 ENCOUNTER — APPOINTMENT (OUTPATIENT)
Dept: LAB | Facility: HOSPITAL | Age: 45
End: 2020-06-22
Attending: STUDENT IN AN ORGANIZED HEALTH CARE EDUCATION/TRAINING PROGRAM
Payer: COMMERCIAL

## 2020-06-22 ENCOUNTER — OFFICE VISIT (OUTPATIENT)
Dept: OBGYN CLINIC | Facility: CLINIC | Age: 45
End: 2020-06-22
Payer: COMMERCIAL

## 2020-06-22 VITALS — WEIGHT: 153.2 LBS | DIASTOLIC BLOOD PRESSURE: 82 MMHG | BODY MASS INDEX: 28.02 KG/M2 | SYSTOLIC BLOOD PRESSURE: 130 MMHG

## 2020-06-22 DIAGNOSIS — Z30.017 NEXPLANON INSERTION: Primary | ICD-10-CM

## 2020-06-22 DIAGNOSIS — Z30.017 NEXPLANON INSERTION: ICD-10-CM

## 2020-06-22 LAB
ALBUMIN SERPL BCP-MCNC: 4.1 G/DL (ref 3.5–5)
ALP SERPL-CCNC: 51 U/L (ref 46–116)
ALT SERPL W P-5'-P-CCNC: 27 U/L (ref 12–78)
ANION GAP SERPL CALCULATED.3IONS-SCNC: 9 MMOL/L (ref 4–13)
AST SERPL W P-5'-P-CCNC: 18 U/L (ref 5–45)
BILIRUB SERPL-MCNC: 0.3 MG/DL (ref 0.2–1)
BUN SERPL-MCNC: 21 MG/DL (ref 5–25)
CALCIUM SERPL-MCNC: 9.1 MG/DL (ref 8.3–10.1)
CHLORIDE SERPL-SCNC: 107 MMOL/L (ref 100–108)
CO2 SERPL-SCNC: 26 MMOL/L (ref 21–32)
CREAT SERPL-MCNC: 0.87 MG/DL (ref 0.6–1.3)
GFR SERPL CREATININE-BSD FRML MDRD: 82 ML/MIN/1.73SQ M
GLUCOSE P FAST SERPL-MCNC: 100 MG/DL (ref 65–99)
POTASSIUM SERPL-SCNC: 4.5 MMOL/L (ref 3.5–5.3)
PROT SERPL-MCNC: 7.6 G/DL (ref 6.4–8.2)
SODIUM SERPL-SCNC: 142 MMOL/L (ref 136–145)

## 2020-06-22 PROCEDURE — 99213 OFFICE O/P EST LOW 20 MIN: CPT | Performed by: STUDENT IN AN ORGANIZED HEALTH CARE EDUCATION/TRAINING PROGRAM

## 2020-06-22 PROCEDURE — 80053 COMPREHEN METABOLIC PANEL: CPT

## 2020-06-22 PROCEDURE — 36415 COLL VENOUS BLD VENIPUNCTURE: CPT

## 2020-07-13 ENCOUNTER — ANNUAL EXAM (OUTPATIENT)
Dept: OBGYN CLINIC | Facility: CLINIC | Age: 45
End: 2020-07-13
Payer: COMMERCIAL

## 2020-07-13 VITALS
BODY MASS INDEX: 27.79 KG/M2 | HEIGHT: 62 IN | DIASTOLIC BLOOD PRESSURE: 82 MMHG | TEMPERATURE: 98.8 F | SYSTOLIC BLOOD PRESSURE: 124 MMHG | WEIGHT: 151 LBS

## 2020-07-13 DIAGNOSIS — Z01.419 ENCOUNTER FOR GYNECOLOGICAL EXAMINATION WITHOUT ABNORMAL FINDING: Primary | ICD-10-CM

## 2020-07-13 DIAGNOSIS — Z72.0 TOBACCO USE: ICD-10-CM

## 2020-07-13 DIAGNOSIS — D07.1 VIN III (VULVAR INTRAEPITHELIAL NEOPLASIA III): ICD-10-CM

## 2020-07-13 PROCEDURE — 99396 PREV VISIT EST AGE 40-64: CPT | Performed by: STUDENT IN AN ORGANIZED HEALTH CARE EDUCATION/TRAINING PROGRAM

## 2020-07-13 PROCEDURE — 87624 HPV HI-RISK TYP POOLED RSLT: CPT | Performed by: STUDENT IN AN ORGANIZED HEALTH CARE EDUCATION/TRAINING PROGRAM

## 2020-07-13 PROCEDURE — G0124 SCREEN C/V THIN LAYER BY MD: HCPCS | Performed by: PATHOLOGY

## 2020-07-13 PROCEDURE — G0145 SCR C/V CYTO,THINLAYER,RESCR: HCPCS | Performed by: PATHOLOGY

## 2020-07-13 NOTE — PROGRESS NOTES
Assessment/Plan:    38 yo  here for annual exam, doing well  PARRIS 3 s/p simple vulvectomy - continues to follow with Gyn oncology  Encouraged pt to schedule mammo  Repeat pap today due to h/o PARRIS  Encouraged to cut back/quit smoking  Continue nexplanon for contraception  F/u in 1 yr or PRN  Encounter for gynecological examination without abnormal finding  -     Liquid-based pap, screening        Subjective:      Patient ID: Tariq Guillen is a 39 y o  female  This is a 39 y o  [id number] who is amenorrheic using nexplanon for contraception  She is sexually active and declines STD testing today  She has no issues with intercourse  No bowel or bladder issues  S/p simple vulvectomy for PARRIS 3     Smokes 3/4 - 1 pk/day  Last pap smear:  negative cytology and HPV  Last mammo: never      The following portions of the patient's history were reviewed and updated as appropriate: allergies, current medications, past family history, past medical history, past social history, past surgical history and problem list     Review of Systems   Constitutional: Negative  HENT: Negative  Eyes: Negative  Respiratory: Negative  Cardiovascular: Negative  Gastrointestinal: Negative  Endocrine: Negative  Genitourinary: Negative for dyspareunia, dysuria, frequency, menstrual problem, pelvic pain, vaginal discharge and vaginal pain  Musculoskeletal: Negative  Skin: Negative  Allergic/Immunologic: Negative  Neurological: Negative  Hematological: Negative  Psychiatric/Behavioral: Negative  Objective:      /82 (BP Location: Left arm, Patient Position: Sitting, Cuff Size: Standard)   Temp 98 8 °F (37 1 °C)   Ht 5' 2" (1 575 m)   Wt 68 5 kg (151 lb)   BMI 27 62 kg/m²          Physical Exam   Constitutional: She is oriented to person, place, and time  She appears well-nourished  Neck: Normal range of motion  Cardiovascular: Normal rate     Pulmonary/Chest: Effort normal  Right breast exhibits no mass, no nipple discharge, no skin change and no tenderness  Left breast exhibits no mass, no nipple discharge, no skin change and no tenderness  Breasts are symmetrical    Abdominal: Soft  Genitourinary: Vagina normal and uterus normal  Rectal exam shows no external hemorrhoid  There is no rash or lesion on the right labia  There is no rash or lesion on the left labia  Uterus is not enlarged and not fixed  Cervix exhibits no motion tenderness and no discharge  Right adnexum displays no mass  Left adnexum displays no mass  No signs of injury around the vagina  Genitourinary Comments: S/p b/l simple vulvectomy  Well healed  No lesions visualized today   Neurological: She is alert and oriented to person, place, and time  Skin: Skin is warm and dry  Psychiatric: She has a normal mood and affect  Vitals reviewed

## 2020-07-15 LAB
HPV HR 12 DNA CVX QL NAA+PROBE: NEGATIVE
HPV16 DNA CVX QL NAA+PROBE: POSITIVE
HPV18 DNA CVX QL NAA+PROBE: NEGATIVE

## 2020-07-20 LAB
LAB AP GYN PRIMARY INTERPRETATION: ABNORMAL
Lab: ABNORMAL
PATH INTERP SPEC-IMP: ABNORMAL

## 2020-07-21 ENCOUNTER — TELEPHONE (OUTPATIENT)
Dept: OBGYN CLINIC | Facility: CLINIC | Age: 45
End: 2020-07-21

## 2020-07-21 NOTE — TELEPHONE ENCOUNTER
----- Message from Anoop Bryan MD sent at 7/20/2020 11:32 AM EDT -----  Can we please set Eliceo Grant up for a colpo? She is aware of results   thanks

## 2020-08-04 ENCOUNTER — PROCEDURE VISIT (OUTPATIENT)
Dept: OBGYN CLINIC | Facility: MEDICAL CENTER | Age: 45
End: 2020-08-04
Payer: COMMERCIAL

## 2020-08-04 VITALS
SYSTOLIC BLOOD PRESSURE: 122 MMHG | TEMPERATURE: 98.4 F | WEIGHT: 151 LBS | BODY MASS INDEX: 27.62 KG/M2 | DIASTOLIC BLOOD PRESSURE: 78 MMHG

## 2020-08-04 DIAGNOSIS — R87.610 ASCUS WITH POSITIVE HIGH RISK HPV CERVICAL: Primary | ICD-10-CM

## 2020-08-04 DIAGNOSIS — Z32.02 PREGNANCY TEST NEGATIVE: ICD-10-CM

## 2020-08-04 DIAGNOSIS — R87.810 ASCUS WITH POSITIVE HIGH RISK HPV CERVICAL: Primary | ICD-10-CM

## 2020-08-04 LAB — SL AMB POCT URINE HCG: NORMAL

## 2020-08-04 PROCEDURE — 88305 TISSUE EXAM BY PATHOLOGIST: CPT | Performed by: PATHOLOGY

## 2020-08-04 PROCEDURE — 57454 BX/CURETT OF CERVIX W/SCOPE: CPT | Performed by: STUDENT IN AN ORGANIZED HEALTH CARE EDUCATION/TRAINING PROGRAM

## 2020-08-04 PROCEDURE — 81025 URINE PREGNANCY TEST: CPT | Performed by: STUDENT IN AN ORGANIZED HEALTH CARE EDUCATION/TRAINING PROGRAM

## 2020-08-04 NOTE — PROGRESS NOTES
Colposcopy    Date/Time: 8/4/2020 3:20 PM  Performed by: Roberta Madrid MD  Authorized by: Roberta Madrid MD     Consent:     Consent obtained:  Verbal    Consent given by:  Patient    Procedural risks discussed:  Bleeding, infection and repeat procedure    Patient questions answered: yes      Patient agrees, verbalizes understanding, and wants to proceed: yes    Pre-procedure:     Prepped with: acetic acid    Indication:     Indication:  ASC-US (HPV 16 +)  Procedure:     Procedure: Colposcopy w/ cervical biopsy and ECC      De Berry speculum was placed in the vagina: yes      Under colposcopic examination the transition zone was seen in entirety: yes      Endocervix was curetted using a Kevorkian curette: yes      Cervical biopsy performed with a cervical biopsy punch: yes      Monsel's solution was applied: yes      Biopsy(s): yes      Location:  12, 6    Specimen to pathology: yes    Post-procedure:     Findings: Mosaicism      Impression: Low grade cervical dysplasia      Patient tolerance of procedure: Tolerated well, no immediate complications  Comments:      H/o PARRIS  ASCUS, HPV 16 +  Uncomplicated colpo  Impression: ANAIS 1  Will f/u by phone

## 2020-08-11 ENCOUNTER — OFFICE VISIT (OUTPATIENT)
Dept: GYNECOLOGIC ONCOLOGY | Facility: CLINIC | Age: 45
End: 2020-08-11
Payer: COMMERCIAL

## 2020-08-11 VITALS
WEIGHT: 150 LBS | RESPIRATION RATE: 18 BRPM | HEIGHT: 62 IN | DIASTOLIC BLOOD PRESSURE: 80 MMHG | TEMPERATURE: 99.4 F | SYSTOLIC BLOOD PRESSURE: 128 MMHG | HEART RATE: 113 BPM | BODY MASS INDEX: 27.6 KG/M2

## 2020-08-11 DIAGNOSIS — B02.9 HERPES ZOSTER WITHOUT COMPLICATION: ICD-10-CM

## 2020-08-11 DIAGNOSIS — N87.0 DYSPLASIA OF CERVIX, LOW GRADE (CIN 1): ICD-10-CM

## 2020-08-11 DIAGNOSIS — D07.1 VIN III (VULVAR INTRAEPITHELIAL NEOPLASIA III): Primary | ICD-10-CM

## 2020-08-11 PROCEDURE — 99214 OFFICE O/P EST MOD 30 MIN: CPT | Performed by: OBSTETRICS & GYNECOLOGY

## 2020-08-11 NOTE — ASSESSMENT & PLAN NOTE
Patient has a recent diagnosis of ANAIS 1 by PAP and biopsy  She does have high-risk HPV subtype  At this point no therapy is warranted she will follow-up with Dr Gaby Allan for this    She will likely need a repeat Pap smear in a year

## 2020-08-11 NOTE — ASSESSMENT & PLAN NOTE
Patient has a new diagnosis of hers be zoster  We have recommended that she avoid her granddaughter until such time as the lesions 5th  Blistering which may already be now  She has no significant pain at this time  Her pain is resolving  Therefore without neuropathy I do not believe any other treatment is in order

## 2020-08-11 NOTE — ASSESSMENT & PLAN NOTE
The patient's the vulva has healed well from the recent resections  There is no obvious evidence of recurrence of disease at this time    We will see the patient back in 6 months for repeat evaluation

## 2020-10-05 ENCOUNTER — OFFICE VISIT (OUTPATIENT)
Dept: DERMATOLOGY | Facility: CLINIC | Age: 45
End: 2020-10-05
Payer: COMMERCIAL

## 2020-10-05 VITALS — TEMPERATURE: 98.6 F | HEIGHT: 66 IN | BODY MASS INDEX: 23.78 KG/M2 | WEIGHT: 148 LBS

## 2020-10-05 DIAGNOSIS — L82.1 SEBORRHEIC KERATOSIS: Primary | ICD-10-CM

## 2020-10-05 DIAGNOSIS — D22.9 MULTIPLE MELANOCYTIC NEVI: ICD-10-CM

## 2020-10-05 DIAGNOSIS — L81.1 MELASMA: ICD-10-CM

## 2020-10-05 DIAGNOSIS — L60.3 NAIL DYSTROPHY: ICD-10-CM

## 2020-10-05 PROCEDURE — 99203 OFFICE O/P NEW LOW 30 MIN: CPT | Performed by: DERMATOLOGY

## 2020-10-19 ENCOUNTER — HOSPITAL ENCOUNTER (EMERGENCY)
Facility: HOSPITAL | Age: 45
Discharge: HOME | End: 2020-10-19
Attending: EMERGENCY MEDICINE
Payer: COMMERCIAL

## 2020-10-19 ENCOUNTER — APPOINTMENT (EMERGENCY)
Dept: RADIOLOGY | Facility: HOSPITAL | Age: 45
End: 2020-10-19
Payer: COMMERCIAL

## 2020-10-19 VITALS
OXYGEN SATURATION: 100 % | SYSTOLIC BLOOD PRESSURE: 116 MMHG | RESPIRATION RATE: 22 BRPM | TEMPERATURE: 98.8 F | HEART RATE: 89 BPM | DIASTOLIC BLOOD PRESSURE: 59 MMHG

## 2020-10-19 DIAGNOSIS — B34.9 VIRAL SYNDROME: ICD-10-CM

## 2020-10-19 DIAGNOSIS — R55 SYNCOPE: Primary | ICD-10-CM

## 2020-10-19 LAB
ALBUMIN SERPL BCP-MCNC: 3.9 G/DL (ref 3.5–5)
ALP SERPL-CCNC: 55 U/L (ref 46–116)
ALT SERPL W P-5'-P-CCNC: 30 U/L (ref 12–78)
ANION GAP SERPL CALCULATED.3IONS-SCNC: 9 MMOL/L (ref 4–13)
AST SERPL W P-5'-P-CCNC: 19 U/L (ref 5–45)
BASOPHILS # BLD AUTO: 0.04 THOUSANDS/ΜL (ref 0–0.1)
BASOPHILS NFR BLD AUTO: 1 % (ref 0–1)
BILIRUB SERPL-MCNC: 0.2 MG/DL (ref 0.2–1)
BILIRUB UR QL STRIP: NEGATIVE
BUN SERPL-MCNC: 13 MG/DL (ref 5–25)
CALCIUM SERPL-MCNC: 9.4 MG/DL (ref 8.3–10.1)
CHLORIDE SERPL-SCNC: 103 MMOL/L (ref 100–108)
CLARITY UR: CLEAR
CO2 SERPL-SCNC: 27 MMOL/L (ref 21–32)
COLOR UR: YELLOW
CREAT SERPL-MCNC: 1.05 MG/DL (ref 0.6–1.3)
D DIMER PPP FEU-MCNC: <0.27 UG/ML FEU
EOSINOPHIL # BLD AUTO: 0.07 THOUSAND/ΜL (ref 0–0.61)
EOSINOPHIL NFR BLD AUTO: 1 % (ref 0–6)
ERYTHROCYTE [DISTWIDTH] IN BLOOD BY AUTOMATED COUNT: 12.8 % (ref 11.6–15.1)
EXT PREG TEST URINE: NEGATIVE
EXT. CONTROL ED NAV: NORMAL
FLUAV RNA NPH QL NAA+PROBE: NORMAL
FLUBV RNA NPH QL NAA+PROBE: NORMAL
GFR SERPL CREATININE-BSD FRML MDRD: 65 ML/MIN/1.73SQ M
GLUCOSE SERPL-MCNC: 105 MG/DL (ref 65–140)
GLUCOSE UR STRIP-MCNC: NEGATIVE MG/DL
HCG SERPL QL: NEGATIVE
HCT VFR BLD AUTO: 43.6 % (ref 34.8–46.1)
HGB BLD-MCNC: 14 G/DL (ref 11.5–15.4)
HGB UR QL STRIP.AUTO: NEGATIVE
IMM GRANULOCYTES # BLD AUTO: 0.01 THOUSAND/UL (ref 0–0.2)
IMM GRANULOCYTES NFR BLD AUTO: 0 % (ref 0–2)
KETONES UR STRIP-MCNC: NEGATIVE MG/DL
LEUKOCYTE ESTERASE UR QL STRIP: NEGATIVE
LYMPHOCYTES # BLD AUTO: 1.67 THOUSANDS/ΜL (ref 0.6–4.47)
LYMPHOCYTES NFR BLD AUTO: 20 % (ref 14–44)
MCH RBC QN AUTO: 29.8 PG (ref 26.8–34.3)
MCHC RBC AUTO-ENTMCNC: 32.1 G/DL (ref 31.4–37.4)
MCV RBC AUTO: 93 FL (ref 82–98)
MONOCYTES # BLD AUTO: 0.77 THOUSAND/ΜL (ref 0.17–1.22)
MONOCYTES NFR BLD AUTO: 9 % (ref 4–12)
NEUTROPHILS # BLD AUTO: 5.86 THOUSANDS/ΜL (ref 1.85–7.62)
NEUTS SEG NFR BLD AUTO: 69 % (ref 43–75)
NITRITE UR QL STRIP: NEGATIVE
NRBC BLD AUTO-RTO: 0 /100 WBCS
PH UR STRIP.AUTO: 5.5 [PH]
PLATELET # BLD AUTO: 228 THOUSANDS/UL (ref 149–390)
PMV BLD AUTO: 10.9 FL (ref 8.9–12.7)
POTASSIUM SERPL-SCNC: 4.6 MMOL/L (ref 3.5–5.3)
PROT SERPL-MCNC: 7.5 G/DL (ref 6.4–8.2)
PROT UR STRIP-MCNC: NEGATIVE MG/DL
RBC # BLD AUTO: 4.7 MILLION/UL (ref 3.81–5.12)
RSV RNA NPH QL NAA+PROBE: NORMAL
S PYO DNA THROAT QL NAA+PROBE: NORMAL
SARS-COV-2 RNA RESP QL NAA+PROBE: NEGATIVE
SODIUM SERPL-SCNC: 139 MMOL/L (ref 136–145)
SP GR UR STRIP.AUTO: <=1.005 (ref 1–1.03)
TROPONIN I SERPL-MCNC: <0.02 NG/ML
TSH SERPL DL<=0.05 MIU/L-ACNC: 0.83 UIU/ML (ref 0.36–3.74)
UROBILINOGEN UR QL STRIP.AUTO: 0.2 E.U./DL
WBC # BLD AUTO: 8.42 THOUSAND/UL (ref 4.31–10.16)

## 2020-10-19 PROCEDURE — 99284 EMERGENCY DEPT VISIT MOD MDM: CPT

## 2020-10-19 PROCEDURE — 96360 HYDRATION IV INFUSION INIT: CPT

## 2020-10-19 PROCEDURE — 84484 ASSAY OF TROPONIN QUANT: CPT | Performed by: EMERGENCY MEDICINE

## 2020-10-19 PROCEDURE — 86308 HETEROPHILE ANTIBODY SCREEN: CPT | Performed by: EMERGENCY MEDICINE

## 2020-10-19 PROCEDURE — 87631 RESP VIRUS 3-5 TARGETS: CPT | Performed by: EMERGENCY MEDICINE

## 2020-10-19 PROCEDURE — 87635 SARS-COV-2 COVID-19 AMP PRB: CPT | Performed by: EMERGENCY MEDICINE

## 2020-10-19 PROCEDURE — 86618 LYME DISEASE ANTIBODY: CPT | Performed by: EMERGENCY MEDICINE

## 2020-10-19 PROCEDURE — 85379 FIBRIN DEGRADATION QUANT: CPT | Performed by: EMERGENCY MEDICINE

## 2020-10-19 PROCEDURE — 36415 COLL VENOUS BLD VENIPUNCTURE: CPT | Performed by: EMERGENCY MEDICINE

## 2020-10-19 PROCEDURE — 87651 STREP A DNA AMP PROBE: CPT | Performed by: EMERGENCY MEDICINE

## 2020-10-19 PROCEDURE — 85025 COMPLETE CBC W/AUTO DIFF WBC: CPT | Performed by: EMERGENCY MEDICINE

## 2020-10-19 PROCEDURE — 99285 EMERGENCY DEPT VISIT HI MDM: CPT | Performed by: EMERGENCY MEDICINE

## 2020-10-19 PROCEDURE — 71045 X-RAY EXAM CHEST 1 VIEW: CPT

## 2020-10-19 PROCEDURE — 81025 URINE PREGNANCY TEST: CPT | Performed by: EMERGENCY MEDICINE

## 2020-10-19 PROCEDURE — 84443 ASSAY THYROID STIM HORMONE: CPT | Performed by: EMERGENCY MEDICINE

## 2020-10-19 PROCEDURE — 80053 COMPREHEN METABOLIC PANEL: CPT | Performed by: EMERGENCY MEDICINE

## 2020-10-19 PROCEDURE — 93005 ELECTROCARDIOGRAM TRACING: CPT

## 2020-10-19 PROCEDURE — 84703 CHORIONIC GONADOTROPIN ASSAY: CPT | Performed by: EMERGENCY MEDICINE

## 2020-10-19 PROCEDURE — 81003 URINALYSIS AUTO W/O SCOPE: CPT | Performed by: EMERGENCY MEDICINE

## 2020-10-19 RX ORDER — SODIUM CHLORIDE 9 MG/ML
3 INJECTION INTRAVENOUS
Status: DISCONTINUED | OUTPATIENT
Start: 2020-10-19 | End: 2020-10-19 | Stop reason: HOSPADM

## 2020-10-19 RX ADMIN — SODIUM CHLORIDE 1000 ML: 0.9 INJECTION, SOLUTION INTRAVENOUS at 14:46

## 2020-10-20 LAB
ATRIAL RATE: 93 BPM
B BURGDOR IGG+IGM SER-ACNC: 6
HETEROPH AB SER QL: NEGATIVE
P AXIS: 56 DEGREES
PR INTERVAL: 126 MS
QRS AXIS: 93 DEGREES
QRSD INTERVAL: 80 MS
QT INTERVAL: 352 MS
QTC INTERVAL: 437 MS
T WAVE AXIS: 13 DEGREES
VENTRICULAR RATE: 93 BPM

## 2020-10-20 PROCEDURE — 93010 ELECTROCARDIOGRAM REPORT: CPT | Performed by: INTERNAL MEDICINE

## 2021-02-10 ENCOUNTER — OFFICE VISIT (OUTPATIENT)
Dept: GYNECOLOGIC ONCOLOGY | Facility: CLINIC | Age: 46
End: 2021-02-10
Payer: COMMERCIAL

## 2021-02-10 VITALS
HEIGHT: 66 IN | WEIGHT: 153 LBS | BODY MASS INDEX: 24.59 KG/M2 | TEMPERATURE: 99.6 F | SYSTOLIC BLOOD PRESSURE: 120 MMHG | RESPIRATION RATE: 18 BRPM | HEART RATE: 84 BPM | DIASTOLIC BLOOD PRESSURE: 88 MMHG

## 2021-02-10 DIAGNOSIS — B02.9 HERPES ZOSTER WITHOUT COMPLICATION: ICD-10-CM

## 2021-02-10 DIAGNOSIS — N87.0 DYSPLASIA OF CERVIX, LOW GRADE (CIN 1): Primary | ICD-10-CM

## 2021-02-10 DIAGNOSIS — D07.1 VIN III (VULVAR INTRAEPITHELIAL NEOPLASIA III): ICD-10-CM

## 2021-02-10 PROCEDURE — 99213 OFFICE O/P EST LOW 20 MIN: CPT | Performed by: OBSTETRICS & GYNECOLOGY

## 2021-02-10 NOTE — ASSESSMENT & PLAN NOTE
Patient has recovered from her herpes zoster  She has no significant chronic long-term pain issues  Will continue to observe

## 2021-02-10 NOTE — ASSESSMENT & PLAN NOTE
Patient's exam shows no signs of recurrent PARRIS 3    We will see her back in 6 months for repeat evaluation

## 2021-02-10 NOTE — PROGRESS NOTES
Assessment/Plan:    Problem List Items Addressed This Visit        Genitourinary    PARRIS III (vulvar intraepithelial neoplasia III)       Patient's exam shows no signs of recurrent PARRIS 3  We will see her back in 6 months for repeat evaluation         Dysplasia of cervix, low grade (ANAIS 1) - Primary      Patient is currently being evaluated for ANAIS 1  We will see the patient back in 6 months for repeat Pap smear  Other    Herpes zoster without complication      Patient has recovered from her herpes zoster  She has no significant chronic long-term pain issues  Will continue to observe  CHIEF COMPLAINT:   Follow-up cervical dysplasia, PARRIS 3, and shingles      Patient ID: Zahra Sanchez is a 39 y o  female  Patient is very pleasant 51-year-old female with a history of PARRIS 3 status post vulvectomy in  January 2020  She also has a ANAIS 1 Pap smear in July of 2020  The patient presented in October for follow-up and had no evidence of recurrence of her dysplasia  She presents for the same  Overall she is doing well  Today, the patient is doing well  She denies significant abdominal pain, pelvic pain, nausea, vomiting, constipation, diarrhea, fevers, chills, or vaginal bleeding  The following portions of the patient's history were reviewed and updated as appropriate: allergies, current medications, past family history, past medical history, past surgical history and problem list     Review of Systems   Constitutional: Negative  HENT: Negative  Eyes: Negative  Respiratory: Negative  Cardiovascular: Negative  Gastrointestinal: Negative  Endocrine: Negative  Genitourinary: Negative  Musculoskeletal: Negative  Skin: Negative  Neurological: Negative  Hematological: Negative  Psychiatric/Behavioral: Negative          Current Outpatient Medications   Medication Sig Dispense Refill    etonogestrel (Nexplanon) subdermal implant 68 mg by Subdermal route once      multivitamin (THERAGRAN) TABS Take 1 tablet by mouth daily      phentermine (ADIPEX-P) 37 5 MG tablet Take 37 5 mg by mouth daily in the early morning Takes 1/2 tablet daily every AM  0     No current facility-administered medications for this visit  Objective:    Blood pressure 120/88, pulse 84, temperature 99 6 °F (37 6 °C), resp  rate 18, height 5' 6" (1 676 m), weight 69 4 kg (153 lb)  Body mass index is 24 69 kg/m²  Body surface area is 1 78 meters squared  Physical Exam  Constitutional:       Appearance: She is well-developed  HENT:      Head: Normocephalic and atraumatic  Eyes:      Pupils: Pupils are equal, round, and reactive to light  Neck:      Musculoskeletal: Normal range of motion and neck supple  Cardiovascular:      Rate and Rhythm: Normal rate and regular rhythm  Heart sounds: Normal heart sounds  Pulmonary:      Effort: Pulmonary effort is normal  No respiratory distress  Breath sounds: Normal breath sounds  Abdominal:      General: Bowel sounds are normal  There is no distension  Palpations: Abdomen is soft  Abdomen is not rigid  Tenderness: There is no abdominal tenderness  There is no guarding or rebound  Genitourinary:     Comments: -Normal external female genitaliaStatus post bilateral vulvectomy  Scars are visible but minimal   No evidence of recurrent dysplasia  , normal Bartholin's and [place]'s glands                  -Normal midline urethral meatus  No lesions notes                  -Bladder without fullness mass or tenderness                  -Vagina without lesion or discharge No significant cystocele or rectocele noted                  -Cervix normal appearing without visible lesions                  -Uterus with normal contour, mobility  No tenderness,                  -Adnexae without  mass or tenderness                  - Anus without fissure of lesion    Musculoskeletal: Normal range of motion  Lymphadenopathy:      Cervical: No cervical adenopathy  Upper Body:      Right upper body: No supraclavicular adenopathy  Left upper body: No supraclavicular adenopathy  Skin:     General: Skin is warm and dry  Neurological:      Mental Status: She is alert and oriented to person, place, and time     Psychiatric:         Behavior: Behavior normal          No results found for:   Lab Results   Component Value Date    K 4 6 10/19/2020     10/19/2020    CO2 27 10/19/2020    BUN 13 10/19/2020    CREATININE 1 05 10/19/2020    GLUF 100 (H) 06/22/2020    CALCIUM 9 4 10/19/2020    AST 19 10/19/2020    ALT 30 10/19/2020    ALKPHOS 55 10/19/2020    EGFR 65 10/19/2020     Lab Results   Component Value Date    WBC 8 42 10/19/2020    HGB 14 0 10/19/2020    HCT 43 6 10/19/2020    MCV 93 10/19/2020     10/19/2020     Lab Results   Component Value Date    NEUTROABS 5 86 10/19/2020        Trend:  No results found for:

## 2021-02-10 NOTE — LETTER
February 10, 2021     [address]  [address] [address]    Patient: Janeen Crawley   YOB: 1975   Date of Visit: 2/10/2021       Dear Dr Pedro Cazares: Thank you for referring Janeen Crawley to me for evaluation  Below are my notes for this consultation  If you have questions, please do not hesitate to call me  I look forward to following your patient along with you  Sincerely,        Boris Ramirez MD        CC: No Recipients  Boris Ramirez MD  2/10/2021  8:58 AM  Sign when Signing Visit  Assessment/Plan:    Problem List Items Addressed This Visit        Genitourinary    PARRIS III (vulvar intraepithelial neoplasia III)       Patient's exam shows no signs of recurrent PARRIS 3  We will see her back in 6 months for repeat evaluation         Dysplasia of cervix, low grade (ANAIS 1) - Primary      Patient is currently being evaluated for NAAIS 1  We will see the patient back in 6 months for repeat Pap smear  Other    Herpes zoster without complication      Patient has recovered from her herpes zoster  She has no significant chronic long-term pain issues  Will continue to observe  CHIEF COMPLAINT:   Follow-up cervical dysplasia, PARRIS 3, and shingles      Patient ID: Janeen Crawley is a 39 y o  female  Patient is very pleasant 61-year-old female with a history of PARRIS 3 status post vulvectomy in  January 2020  She also has a ANAIS 1 Pap smear in July of 2020  The patient presented in October for follow-up and had no evidence of recurrence of her dysplasia  She presents for the same  Overall she is doing well  Today, the patient is doing well  She denies significant abdominal pain, pelvic pain, nausea, vomiting, constipation, diarrhea, fevers, chills, or vaginal bleeding        The following portions of the patient's history were reviewed and updated as appropriate: allergies, current medications, past family history, past medical history, past surgical history and problem list     Review of Systems   Constitutional: Negative  HENT: Negative  Eyes: Negative  Respiratory: Negative  Cardiovascular: Negative  Gastrointestinal: Negative  Endocrine: Negative  Genitourinary: Negative  Musculoskeletal: Negative  Skin: Negative  Neurological: Negative  Hematological: Negative  Psychiatric/Behavioral: Negative  Current Outpatient Medications   Medication Sig Dispense Refill    etonogestrel (Nexplanon) subdermal implant 68 mg by Subdermal route once      multivitamin (THERAGRAN) TABS Take 1 tablet by mouth daily      phentermine (ADIPEX-P) 37 5 MG tablet Take 37 5 mg by mouth daily in the early morning Takes 1/2 tablet daily every AM  0     No current facility-administered medications for this visit  Objective:    Blood pressure 120/88, pulse 84, temperature 99 6 °F (37 6 °C), resp  rate 18, height 5' 6" (1 676 m), weight 69 4 kg (153 lb)  Body mass index is 24 69 kg/m²  Body surface area is 1 78 meters squared  Physical Exam  Constitutional:       Appearance: She is well-developed  HENT:      Head: Normocephalic and atraumatic  Eyes:      Pupils: Pupils are equal, round, and reactive to light  Neck:      Musculoskeletal: Normal range of motion and neck supple  Cardiovascular:      Rate and Rhythm: Normal rate and regular rhythm  Heart sounds: Normal heart sounds  Pulmonary:      Effort: Pulmonary effort is normal  No respiratory distress  Breath sounds: Normal breath sounds  Abdominal:      General: Bowel sounds are normal  There is no distension  Palpations: Abdomen is soft  Abdomen is not rigid  Tenderness: There is no abdominal tenderness  There is no guarding or rebound  Genitourinary:     Comments: -Normal external female genitaliaStatus post bilateral vulvectomy    Scars are visible but minimal   No evidence of recurrent dysplasia  , normal Bartholin's and [place]'s glands                  -Normal midline urethral meatus  No lesions notes                  -Bladder without fullness mass or tenderness                  -Vagina without lesion or discharge No significant cystocele or rectocele noted                  -Cervix normal appearing without visible lesions                  -Uterus with normal contour, mobility  No tenderness,                  -Adnexae without  mass or tenderness                  - Anus without fissure of lesion    Musculoskeletal: Normal range of motion  Lymphadenopathy:      Cervical: No cervical adenopathy  Upper Body:      Right upper body: No supraclavicular adenopathy  Left upper body: No supraclavicular adenopathy  Skin:     General: Skin is warm and dry  Neurological:      Mental Status: She is alert and oriented to person, place, and time     Psychiatric:         Behavior: Behavior normal          No results found for:   Lab Results   Component Value Date    K 4 6 10/19/2020     10/19/2020    CO2 27 10/19/2020    BUN 13 10/19/2020    CREATININE 1 05 10/19/2020    GLUF 100 (H) 06/22/2020    CALCIUM 9 4 10/19/2020    AST 19 10/19/2020    ALT 30 10/19/2020    ALKPHOS 55 10/19/2020    EGFR 65 10/19/2020     Lab Results   Component Value Date    WBC 8 42 10/19/2020    HGB 14 0 10/19/2020    HCT 43 6 10/19/2020    MCV 93 10/19/2020     10/19/2020     Lab Results   Component Value Date    NEUTROABS 5 86 10/19/2020        Trend:  No results found for:

## 2021-02-10 NOTE — ASSESSMENT & PLAN NOTE
Patient is currently being evaluated for ANAIS 1  We will see the patient back in 6 months for repeat Pap smear

## 2021-02-23 ENCOUNTER — TELEPHONE (OUTPATIENT)
Dept: OBGYN CLINIC | Facility: CLINIC | Age: 46
End: 2021-02-23

## 2021-02-23 DIAGNOSIS — Z12.31 VISIT FOR SCREENING MAMMOGRAM: Primary | ICD-10-CM

## 2021-04-14 ENCOUNTER — OFFICE VISIT (OUTPATIENT)
Dept: OBGYN CLINIC | Facility: CLINIC | Age: 46
End: 2021-04-14
Payer: COMMERCIAL

## 2021-04-14 VITALS
WEIGHT: 152 LBS | BODY MASS INDEX: 24.43 KG/M2 | RESPIRATION RATE: 14 BRPM | SYSTOLIC BLOOD PRESSURE: 120 MMHG | DIASTOLIC BLOOD PRESSURE: 80 MMHG | HEIGHT: 66 IN

## 2021-04-14 DIAGNOSIS — N92.6 IRREGULAR MENSTRUAL BLEEDING: Primary | ICD-10-CM

## 2021-04-14 DIAGNOSIS — Z12.31 BREAST CANCER SCREENING BY MAMMOGRAM: ICD-10-CM

## 2021-04-14 PROCEDURE — 99396 PREV VISIT EST AGE 40-64: CPT | Performed by: OBSTETRICS & GYNECOLOGY

## 2021-04-14 NOTE — ASSESSMENT & PLAN NOTE
Discussed cessation   Advised to attempt to decrease weekly until complete cessation    Discussed health concerns R/T smoking

## 2021-04-14 NOTE — PROGRESS NOTES
Assessment/Plan:    Tobacco use  Discussed cessation   Advised to attempt to decrease weekly until complete cessation  Discussed health concerns R/T smoking    Irregular menstrual bleeding  Clinical exam WNL  Nexplanon in place  TVUS ordered, CBC, TSH  Advised Iron rich diet     Dysplasia of cervix, low grade (ANAIS 1)  Advised to keep F/u appointment for repeat PAP in 6 months as advised by GYN/ONC    Breast cancer screening by mammogram  New order placed as requested, pt sts she needed a slip that stated 3d due to implants  Diagnoses and all orders for this visit:    Irregular menstrual bleeding  -     CBC and differential; Future  -     US pelvis complete w transvaginal; Future  -     TSH, 3rd generation with Free T4 reflex; Future    Breast cancer screening by mammogram  -     Mammo screening bilateral w 3d & cad; Future          Subjective:      Patient ID: Marla Klinefelter is a 39 y o  female  [facility] 45 year presents with 3 5 and weeks of vaginal bleeding,  Reports Flow was normal as if she had her regular menses, just prolonged,  bleeding has ceased at this time  Patient a Nexplanon implant for 1 year prior to that she was on Depo for 2 years and has been amenorrheic for that entire duration  Nexplanon present and intact  Denies bleeding with intercourse  Recent vulvectomy for PARRIS 111,  Recent Pap with ANAIS 1 and positive HPV  Patient is followed by Gyn/ oncology      The following portions of the patient's history were reviewed and updated as appropriate: allergies, current medications, past family history, past medical history, past social history, past surgical history and problem list     Review of Systems   Constitutional: Negative  Respiratory: Negative for cough and shortness of breath  Cardiovascular: Negative for chest pain  Gastrointestinal: Negative for abdominal pain, constipation and diarrhea     Genitourinary: Negative for vaginal bleeding (no longer bleeding) and vaginal discharge  Skin: Negative  Allergic/Immunologic: Negative  Neurological: Positive for dizziness (sts she was dizzy at times with prolonged bleeding ,stable today )  Objective:      /80 (BP Location: Right arm, Patient Position: Sitting, Cuff Size: Standard)   Resp 14   Ht 5' 6" (1 676 m)   Wt 68 9 kg (152 lb)   BMI 24 53 kg/m²          Physical Exam  Vitals signs and nursing note reviewed  Constitutional:       Appearance: Normal appearance  HENT:      Head: Normocephalic and atraumatic  Eyes:      Conjunctiva/sclera: Conjunctivae normal    Neck:      Musculoskeletal: Normal range of motion  Cardiovascular:      Rate and Rhythm: Normal rate and regular rhythm  Pulmonary:      Effort: Pulmonary effort is normal       Breath sounds: Normal breath sounds  Chest:      Breasts: Reed Score is 5  Right: Normal          Left: Normal    Abdominal:      Palpations: Abdomen is soft  Hernia: There is no hernia in the left inguinal area or right inguinal area  Genitourinary:     General: Normal vulva  Exam position: Lithotomy position  Reed stage (genital): 5  Labia:         Right: No rash  Left: No rash  Vagina: Normal       Cervix: No discharge, lesion or cervical bleeding  Uterus: Normal        Adnexa: Right adnexa normal and left adnexa normal    Lymphadenopathy:      Lower Body: No right inguinal adenopathy  No left inguinal adenopathy  Skin:     General: Skin is warm and dry  Neurological:      Mental Status: She is alert and oriented to person, place, and time  Psychiatric:         Mood and Affect: Mood normal          Behavior: Behavior normal          Thought Content:  Thought content normal          Judgment: Judgment normal

## 2021-04-14 NOTE — PATIENT INSTRUCTIONS
Menorrhagia   AMBULATORY CARE:   Menorrhagia  is heavy menstrual bleeding for more than 7 days or severe menstrual bleeding for less than 7 days  Your menstrual bleeding and cramping are so heavy that you have trouble doing your usual daily activities  Your monthly period may also occur more often, and you may bleed between periods  Menorrhagia is common in adolescence and around menopause  Common symptoms include the following:   · Soaking a pad or tampon every 1 to 2 hours    · Using both a pad and a tampon    · Waking up at night to change your pad or tampon    · Blood clots with your bleeding for more than 1 day    · Abdominal pain or cramps    Call 911 for any of the following:   · You have chest pain and shortness of breath  · Your heart is fluttering or beating faster than usual for you  Seek care immediately if:   · You feel dizzy when you stand  · You feel confused  · You have severe abdominal pain, nausea, and vomiting  · Your skin or the whites of your eyes turn yellow  Contact your healthcare provider if:   · You need to change your pad or tampon more than 1 time per hour, for several hours in a row  · You feel more weak and tired than usual      · You have new coldness in your hands and feet  · You have questions or concerns about your condition or care  Medicines: You may need any of the following:  · Iron supplements  may be given if your blood iron level decreases because of heavy bleeding  · NSAIDs , such as ibuprofen, treat menstrual cramps  This medicine is available with or without a doctor's order  NSAIDs can cause stomach bleeding or kidney problems in certain people  If you take blood thinner medicine, always ask your healthcare provider if NSAIDs are safe for you  Always read the medicine label and follow directions  · Hormones  help slow or stop your bleeding and make your monthly periods more regular   This medicine may be given as birth control pills or an intrauterine device (IUD)  · Take your medicine as directed  Contact your healthcare provider if you think your medicine is not helping or if you have side effects  Tell him of her if you are allergic to any medicine  Keep a list of the medicines, vitamins, and herbs you take  Include the amounts, and when and why you take them  Bring the list or the pill bottles to follow-up visits  Carry your medicine list with you in case of an emergency  Manage your symptoms:   · Keep a supply of pads or tampons with you at all times  If possible, stay close to a bathroom  · Apply heat  on your abdomen for 20 to 30 minutes every 2 hours for as many days as directed  Heat helps decrease pain and cramps  Follow up with your healthcare provider as directed: You may need regular pelvic exams with Pap smears to monitor your condition  Write down your questions so you remember to ask them during your visits  © Copyright [address] Information is for End User's use only and may not be sold, redistributed or otherwise used for commercial purposes  All illustrations and images included in CareNotes® are the copyrighted property of [employer]  or [facility] Laurita al   The above information is an  only  It is not intended as medical advice for individual conditions or treatments  Talk to your doctor, nurse or pharmacist before following any medical regimen to see if it is safe and effective for you  Iron Rich Diet   WHAT YOU NEED TO KNOW:   An iron-rich diet includes foods that are good sources of iron  People need extra iron during childhood, adolescence (teenage years), and pregnancy  Iron is a mineral that your body needs to make hemoglobin  Hemoglobin is part of your blood and helps carry oxygen from your lungs to the rest of your body  You may need to follow this diet to treat or prevent a low blood iron level or iron deficiency anemia     DISCHARGE INSTRUCTIONS:   Daily iron needs: · Males:      ? 3to 1years old: 7 mg    ? 3to 6years old: 10 mg    ? 5to 15years old: 8 mg    ? 15to 25years old: 11 mg    ? 19 years and older: 8 mg    · Females:      ? 3to 1years old: 7 mg    ? 3to 6years old: 10 mg    ? 5to 15years old: 8 mg    ? 15to 25years old: 15 mg    ? 19 to 50 years: 18 mg    ? Over 46years old: 8 mg    ? Pregnant women:  27 mg    Foods that contain iron:   · Meat, fish, and poultry are good sources of iron  They contain heme iron, a form of iron that your body absorbs very well  Fruit, vegetables, eggs, and grains such as pasta, rice, and cereal also contain iron  They contain nonheme iron, a form of iron that is not absorbed as well as heme iron  You can absorb more iron from these foods by eating a food that is high in vitamin C at the same time  You can also absorb more nonheme iron by eating a food from the meat, fish, and poultry group at the same time  · Fish and shellfish contain some mercury, a metal that can be harmful to the body  Children and unborn babies are at higher risk for harm caused by mercury  Children and pregnant women should avoid eating fish high in mercury, such as shark and swordfish  They should also eat only fish that are lower in mercury, such as salmon, canned light tuna, and catfish  Limit the amount of low-mercury fish and shellfish you eat to less than 12 ounces per week  Iron-rich foods:   · Foods that contain 2 mg or more per serving:      ? 3 ounces of cooked beef (jacinto, eye of round) or cooked turkey (dark meat)    ? ½ cup of beans (black, kidney, or lentil, or soybeans)    ? ½ cup of tofu    ? 1 medium baked potato    ? 1 cup of cooked artichoke or cooked spinach    ? ¾ cup of instant oatmeal    ? 1 cup of corn flakes    · Foods that contain 1 to 2 mg per serving:      ? 3 ounces of chicken    ? 3 ounces of pork    ? 3 ounces of turkey (light meat)    ? 3 ounces of light tuna    ? ½ cup of seedless, packed raisins    ?  1 slice of whole-wheat or white bread    Good sources of vitamin C:  Eat a serving of vitamin C with any iron-rich food to help your body absorb more iron  The following fruits and vegetables are good sources of vitamin C:  · 1 cup of fresh orange juice (124 mg) or pink grapefruit juice (83 mg)    · 1 cup of strawberries (106 mg)    · 1 cup of diced cantaloupe (68 mg)     · 1 cup of sweet yellow pepper (283 mg)    · 1 cup of fresh, boiled broccoli (116 mg) or cooked brussels sprouts (97 mg)    · 1 cup of kale (53 mg)    · 1 cup of tomato juice (45 mg)    Other guidelines to follow:   · Tea and coffee can decrease the amount of iron that your body absorbs from iron-rich foods  Drink coffee and tea separately from meals that contain iron-rich foods  · Children over the age of 1 year only need about 24 ounces of cow's milk each day  When children drink too much milk, they may eat fewer iron-rich foods  This may cause them to have a low level of iron in their blood  Risks: If you do not eat iron-rich foods and vitamin C every day, you may have low blood iron levels  This may lead to iron deficiency anemia, especially during periods when your body needs extra iron  Iron deficiency anemia may cause problems with your child's growth and development  If you have iron deficiency anemia, you may develop other health problems  © Copyright [address] Information is for End User's use only and may not be sold, redistributed or otherwise used for commercial purposes  All illustrations and images included in CareNotes® are the copyrighted property of [employer] M , Inc  or [facility] Laurita Trammell   The above information is an  only  It is not intended as medical advice for individual conditions or treatments  Talk to your doctor, nurse or pharmacist before following any medical regimen to see if it is safe and effective for you

## 2021-04-20 ENCOUNTER — APPOINTMENT (OUTPATIENT)
Dept: LAB | Facility: HOSPITAL | Age: 46
End: 2021-04-20
Payer: COMMERCIAL

## 2021-04-20 ENCOUNTER — HOSPITAL ENCOUNTER (OUTPATIENT)
Dept: ULTRASOUND IMAGING | Facility: CLINIC | Age: 46
Discharge: HOME | End: 2021-04-20
Payer: COMMERCIAL

## 2021-04-20 DIAGNOSIS — N92.6 IRREGULAR MENSTRUAL BLEEDING: ICD-10-CM

## 2021-04-20 LAB
BASOPHILS # BLD AUTO: 0.03 THOUSANDS/ΜL (ref 0–0.1)
BASOPHILS NFR BLD AUTO: 0 % (ref 0–1)
EOSINOPHIL # BLD AUTO: 0.08 THOUSAND/ΜL (ref 0–0.61)
EOSINOPHIL NFR BLD AUTO: 1 % (ref 0–6)
ERYTHROCYTE [DISTWIDTH] IN BLOOD BY AUTOMATED COUNT: 12.8 % (ref 11.6–15.1)
HCT VFR BLD AUTO: 42.9 % (ref 34.8–46.1)
HGB BLD-MCNC: 14 G/DL (ref 11.5–15.4)
IMM GRANULOCYTES # BLD AUTO: 0.01 THOUSAND/UL (ref 0–0.2)
IMM GRANULOCYTES NFR BLD AUTO: 0 % (ref 0–2)
LYMPHOCYTES # BLD AUTO: 2.33 THOUSANDS/ΜL (ref 0.6–4.47)
LYMPHOCYTES NFR BLD AUTO: 35 % (ref 14–44)
MCH RBC QN AUTO: 29.5 PG (ref 26.8–34.3)
MCHC RBC AUTO-ENTMCNC: 32.6 G/DL (ref 31.4–37.4)
MCV RBC AUTO: 90 FL (ref 82–98)
MONOCYTES # BLD AUTO: 0.57 THOUSAND/ΜL (ref 0.17–1.22)
MONOCYTES NFR BLD AUTO: 8 % (ref 4–12)
NEUTROPHILS # BLD AUTO: 3.73 THOUSANDS/ΜL (ref 1.85–7.62)
NEUTS SEG NFR BLD AUTO: 56 % (ref 43–75)
NRBC BLD AUTO-RTO: 0 /100 WBCS
PLATELET # BLD AUTO: 218 THOUSANDS/UL (ref 149–390)
PMV BLD AUTO: 11 FL (ref 8.9–12.7)
RBC # BLD AUTO: 4.75 MILLION/UL (ref 3.81–5.12)
TSH SERPL DL<=0.05 MIU/L-ACNC: 1.61 UIU/ML (ref 0.36–3.74)
WBC # BLD AUTO: 6.75 THOUSAND/UL (ref 4.31–10.16)

## 2021-04-20 PROCEDURE — 84443 ASSAY THYROID STIM HORMONE: CPT

## 2021-04-20 PROCEDURE — 36415 COLL VENOUS BLD VENIPUNCTURE: CPT

## 2021-04-20 PROCEDURE — 85025 COMPLETE CBC W/AUTO DIFF WBC: CPT

## 2021-04-20 PROCEDURE — 76856 US EXAM PELVIC COMPLETE: CPT

## 2021-04-20 PROCEDURE — 76830 TRANSVAGINAL US NON-OB: CPT

## 2021-05-11 ENCOUNTER — HOSPITAL ENCOUNTER (OUTPATIENT)
Dept: MAMMOGRAPHY | Facility: CLINIC | Age: 46
Discharge: HOME | End: 2021-05-11
Payer: COMMERCIAL

## 2021-05-11 ENCOUNTER — TELEPHONE (OUTPATIENT)
Dept: OBGYN CLINIC | Facility: CLINIC | Age: 46
End: 2021-05-11

## 2021-05-11 VITALS — BODY MASS INDEX: 24.43 KG/M2 | WEIGHT: 152 LBS | HEIGHT: 66 IN

## 2021-05-11 DIAGNOSIS — Z12.31 BREAST CANCER SCREENING BY MAMMOGRAM: ICD-10-CM

## 2021-05-11 PROCEDURE — 77067 SCR MAMMO BI INCL CAD: CPT

## 2021-05-11 PROCEDURE — 77063 BREAST TOMOSYNTHESIS BI: CPT

## 2021-05-11 NOTE — TELEPHONE ENCOUNTER
Per comm consent lm to pt wnl results    ----- Message from Jac Ovalle MD sent at 5/11/2021  1:55 PM EDT -----  Please notify Asael Parekh that Yogi Goncalves is benign - repeat in 1 yr

## 2021-07-19 ENCOUNTER — ANNUAL EXAM (OUTPATIENT)
Dept: OBGYN CLINIC | Facility: CLINIC | Age: 46
End: 2021-07-19
Payer: COMMERCIAL

## 2021-07-19 VITALS
BODY MASS INDEX: 24.75 KG/M2 | DIASTOLIC BLOOD PRESSURE: 78 MMHG | HEIGHT: 66 IN | SYSTOLIC BLOOD PRESSURE: 118 MMHG | WEIGHT: 154 LBS

## 2021-07-19 DIAGNOSIS — Z87.42 H/O ABNORMAL CERVICAL PAPANICOLAOU SMEAR: ICD-10-CM

## 2021-07-19 DIAGNOSIS — Z01.419 ENCOUNTER FOR ANNUAL ROUTINE GYNECOLOGICAL EXAMINATION: Primary | ICD-10-CM

## 2021-07-19 DIAGNOSIS — Z01.419 ENCOUNTER FOR GYNECOLOGICAL EXAMINATION: ICD-10-CM

## 2021-07-19 DIAGNOSIS — Z12.31 ENCOUNTER FOR SCREENING MAMMOGRAM FOR MALIGNANT NEOPLASM OF BREAST: ICD-10-CM

## 2021-07-19 DIAGNOSIS — N87.0 DYSPLASIA OF CERVIX, LOW GRADE (CIN 1): ICD-10-CM

## 2021-07-19 DIAGNOSIS — D07.1 VIN III (VULVAR INTRAEPITHELIAL NEOPLASIA III): ICD-10-CM

## 2021-07-19 DIAGNOSIS — Z11.3 SCREENING FOR STDS (SEXUALLY TRANSMITTED DISEASES): ICD-10-CM

## 2021-07-19 PROCEDURE — 87491 CHLMYD TRACH DNA AMP PROBE: CPT | Performed by: STUDENT IN AN ORGANIZED HEALTH CARE EDUCATION/TRAINING PROGRAM

## 2021-07-19 PROCEDURE — G0145 SCR C/V CYTO,THINLAYER,RESCR: HCPCS | Performed by: STUDENT IN AN ORGANIZED HEALTH CARE EDUCATION/TRAINING PROGRAM

## 2021-07-19 PROCEDURE — 87591 N.GONORRHOEAE DNA AMP PROB: CPT | Performed by: STUDENT IN AN ORGANIZED HEALTH CARE EDUCATION/TRAINING PROGRAM

## 2021-07-19 PROCEDURE — 99396 PREV VISIT EST AGE 40-64: CPT | Performed by: STUDENT IN AN ORGANIZED HEALTH CARE EDUCATION/TRAINING PROGRAM

## 2021-07-19 PROCEDURE — G0476 HPV COMBO ASSAY CA SCREEN: HCPCS | Performed by: STUDENT IN AN ORGANIZED HEALTH CARE EDUCATION/TRAINING PROGRAM

## 2021-07-19 NOTE — PROGRESS NOTES
shortness of breath  Cardiovascular: Negative  Gastrointestinal: Negative  Endocrine: Negative  Genitourinary: Positive for menstrual problem  Negative for dyspareunia, dysuria, frequency, hematuria, pelvic pain, vaginal discharge and vaginal pain  Musculoskeletal: Positive for back pain  Skin: Negative  Negative for color change and rash  Allergic/Immunologic: Negative  Neurological: Negative  Negative for tremors  Hematological: Negative  Psychiatric/Behavioral: Negative  All other systems reviewed and are negative  Objective:      /78 (BP Location: Left arm, Patient Position: Sitting, Cuff Size: Standard)   Ht 5' 6" (1 676 m)   Wt 69 9 kg (154 lb)   LMP 06/18/2021 (Exact Date)   BMI 24 86 kg/m²          Physical Exam  Vitals reviewed  Exam conducted with a chaperone present  Cardiovascular:      Rate and Rhythm: Normal rate  Pulmonary:      Effort: Pulmonary effort is normal    Chest:      Breasts: Breasts are symmetrical          Right: No mass, nipple discharge, skin change or tenderness  Left: No mass, nipple discharge, skin change or tenderness  Comments: B/l implants  Abdominal:      Palpations: Abdomen is soft  Genitourinary:     Labia:         Right: No rash or lesion  Left: No rash or lesion  Vagina: Normal  No signs of injury  Cervix: No cervical motion tenderness, discharge, friability or lesion  Uterus: Not deviated, not enlarged, not fixed and not tender  Adnexa:         Right: No mass, tenderness or fullness  Left: No mass, tenderness or fullness  Musculoskeletal:      Cervical back: Normal range of motion  Skin:     General: Skin is warm and dry  Neurological:      Mental Status: She is alert and oriented to person, place, and time     Psychiatric:         Behavior: Behavior normal

## 2021-07-20 LAB
C TRACH DNA SPEC QL NAA+PROBE: NEGATIVE
HPV HR 12 DNA CVX QL NAA+PROBE: NEGATIVE
HPV16 DNA CVX QL NAA+PROBE: NEGATIVE
HPV18 DNA CVX QL NAA+PROBE: NEGATIVE
N GONORRHOEA DNA SPEC QL NAA+PROBE: NEGATIVE

## 2021-07-26 LAB
LAB AP GYN PRIMARY INTERPRETATION: NORMAL
Lab: NORMAL

## 2021-08-11 ENCOUNTER — OFFICE VISIT (OUTPATIENT)
Dept: GYNECOLOGIC ONCOLOGY | Facility: CLINIC | Age: 46
End: 2021-08-11
Payer: COMMERCIAL

## 2021-08-11 VITALS
SYSTOLIC BLOOD PRESSURE: 138 MMHG | RESPIRATION RATE: 18 BRPM | WEIGHT: 152 LBS | HEART RATE: 84 BPM | TEMPERATURE: 97.4 F | BODY MASS INDEX: 24.43 KG/M2 | DIASTOLIC BLOOD PRESSURE: 64 MMHG | HEIGHT: 66 IN

## 2021-08-11 DIAGNOSIS — N87.0 DYSPLASIA OF CERVIX, LOW GRADE (CIN 1): ICD-10-CM

## 2021-08-11 DIAGNOSIS — D07.1 VIN III (VULVAR INTRAEPITHELIAL NEOPLASIA III): Primary | ICD-10-CM

## 2021-08-11 PROCEDURE — 88175 CYTOPATH C/V AUTO FLUID REDO: CPT | Performed by: OBSTETRICS & GYNECOLOGY

## 2021-08-11 PROCEDURE — 99213 OFFICE O/P EST LOW 20 MIN: CPT | Performed by: OBSTETRICS & GYNECOLOGY

## 2021-08-11 NOTE — LETTER
August 11, 2021     [address]ish [address]  [address] [address]    Patient: Aby Jain   YOB: 1975   Date of Visit: 8/11/2021       Dear Dr Sadie Rizzo: Thank you for referring Aby Jain to me for evaluation  Below are my notes for this consultation  If you have questions, please do not hesitate to call me  I look forward to following your patient along with you  Sincerely,        Sabi Kent MD        CC: No Recipients  Sabi Kent MD  8/11/2021  9:07 AM  Sign when Signing Visit  Assessment/Plan:    Problem List Items Addressed This Visit        Genitourinary    PARRIS III (vulvar intraepithelial neoplasia III) - Primary       Patient has a history of PARRIS 3  She was treated 18 months ago and remains in clinical remission  She will follow-up with Dr Jeanette Laguerre  for continued evaluation on an annual basis  Dysplasia of cervix, low grade (ANAIS 1)       Patient has a history of mild dysplasia  Repeat Pap smears performed she will follow-up with Dr Jeanette Laguerre for continued management  Also with regard to irregular bleeding and Norplant patient will follow-up with Dr Jeanette Laguerre she can see us on an as-needed basis  CHIEF COMPLAINT: History of PARRIS 3 in ANAIS 1          Patient ID: Aby Jain is a 42 y o  female   Patient is very pleasant 57-year-old female with history of PARRIS 3 status post resection in January of 2020 and ANAIS 1 identified on Pap smear  She presents today in follow-up  She has recently had a pelvic ultrasound which was unremarkable in April of 2021  She is up-to-date with mammograms  She presents today for follow-up  She has no new complaint  Today, the patient is doing well  She denies significant abdominal pain, pelvic pain, nausea, vomiting, constipation, diarrhea, fevers, chills  She has daily vaginal bleeding likely related to her Norplant  She is to see Dr Isabela Mott ir for removal   She has no itching burning or any other vulvar or vaginal complaints  The following portions of the patient's history were reviewed and updated as appropriate: allergies, current medications, past family history, past medical history, past surgical history and problem list     Review of Systems   Constitutional: Negative  HENT: Negative  Eyes: Negative  Respiratory: Negative  Cardiovascular: Negative  Gastrointestinal: Negative  Endocrine: Negative  Genitourinary: Positive for vaginal bleeding  Musculoskeletal: Negative  Skin: Negative  Neurological: Negative  Hematological: Negative  Psychiatric/Behavioral: Negative  Current Outpatient Medications   Medication Sig Dispense Refill    etonogestrel (Nexplanon) subdermal implant 68 mg by Subdermal route once      multivitamin (THERAGRAN) TABS Take 1 tablet by mouth daily      phentermine (ADIPEX-P) 37 5 MG tablet Take 37 5 mg by mouth daily in the early morning Takes 1/2 tablet daily every AM (Patient not taking: Reported on 7/19/2021)  0     No current facility-administered medications for this visit  Objective:    Blood pressure 138/64, pulse 84, temperature (!) 97 4 °F (36 3 °C), resp  rate 18, height 5' 6" (1 676 m), weight 68 9 kg (152 lb)  Body mass index is 24 53 kg/m²  Body surface area is 1 78 meters squared  Physical Exam  Constitutional:       Appearance: She is well-developed  HENT:      Head: Normocephalic and atraumatic  Eyes:      Pupils: Pupils are equal, round, and reactive to light  Cardiovascular:      Rate and Rhythm: Normal rate and regular rhythm  Heart sounds: Normal heart sounds  Pulmonary:      Effort: Pulmonary effort is normal  No respiratory distress  Breath sounds: Normal breath sounds  Abdominal:      General: Bowel sounds are normal  There is no distension  Palpations: Abdomen is soft   Abdomen is not rigid  Tenderness: There is no abdominal tenderness  There is no guarding or rebound  Genitourinary:     Comments: -Normal external female genitalia, normal Bartholin's and [place]'s glands                  -Normal midline urethral meatus  No lesions notes                  -Bladder without fullness mass or tenderness                  -Vagina without lesion or discharge No significant cystocele or rectocele noted                  -Cervix normal appearing without visible lesions                  -Uterus with normal contour, mobility  No tenderness,                  -Adnexae without  mass or tenderness                  - Anus without fissure of lesion  No evidence of recurrent vulvar dysplasia  Incision site on the left vulva is unremarkable  Musculoskeletal:         General: Normal range of motion  Cervical back: Normal range of motion and neck supple  Lymphadenopathy:      Cervical: No cervical adenopathy  Upper Body:      Right upper body: No supraclavicular adenopathy  Left upper body: No supraclavicular adenopathy  Skin:     General: Skin is warm and dry  Neurological:      Mental Status: She is alert and oriented to person, place, and time     Psychiatric:         Behavior: Behavior normal          No results found for:   Lab Results   Component Value Date    K 4 6 10/19/2020     10/19/2020    CO2 27 10/19/2020    BUN 13 10/19/2020    CREATININE 1 05 10/19/2020    GLUF 100 (H) 06/22/2020    CALCIUM 9 4 10/19/2020    AST 19 10/19/2020    ALT 30 10/19/2020    ALKPHOS 55 10/19/2020    EGFR 65 10/19/2020     Lab Results   Component Value Date    WBC 6 75 04/20/2021    HGB 14 0 04/20/2021    HCT 42 9 04/20/2021    MCV 90 04/20/2021     04/20/2021     Lab Results   Component Value Date    NEUTROABS 3 73 04/20/2021        Trend:  No results found for:

## 2021-08-11 NOTE — ASSESSMENT & PLAN NOTE
Patient has a history of PARRIS 3  She was treated 18 months ago and remains in clinical remission  She will follow-up with Dr Hina Candelaria  for continued evaluation on an annual basis

## 2021-08-11 NOTE — PROGRESS NOTES
Assessment/Plan:    Problem List Items Addressed This Visit        Genitourinary    PARRIS III (vulvar intraepithelial neoplasia III) - Primary       Patient has a history of PARRIS 3  She was treated 18 months ago and remains in clinical remission  She will follow-up with Dr Seymour Kitchen  for continued evaluation on an annual basis  Dysplasia of cervix, low grade (ANAIS 1)       Patient has a history of mild dysplasia  Repeat Pap smears performed she will follow-up with Dr Seymour Kitchen for continued management  Also with regard to irregular bleeding and Norplant patient will follow-up with Dr Seymour Kitchen she can see us on an as-needed basis  CHIEF COMPLAINT: History of PARRIS 3 in ANAIS 1          Patient ID: Ev Ingram is a 42 y o  female   Patient is very pleasant 82-year-old female with history of PARRIS 3 status post resection in January of 2020 and ANAIS 1 identified on Pap smear  She presents today in follow-up  She has recently had a pelvic ultrasound which was unremarkable in April of 2021  She is up-to-date with mammograms  She presents today for follow-up  She has no new complaint  Today, the patient is doing well  She denies significant abdominal pain, pelvic pain, nausea, vomiting, constipation, diarrhea, fevers, chills  She has daily vaginal bleeding likely related to her Norplant  She is to see Dr Seymour Kitchen ir for removal   She has no itching burning or any other vulvar or vaginal complaints  The following portions of the patient's history were reviewed and updated as appropriate: allergies, current medications, past family history, past medical history, past surgical history and problem list     Review of Systems   Constitutional: Negative  HENT: Negative  Eyes: Negative  Respiratory: Negative  Cardiovascular: Negative  Gastrointestinal: Negative  Endocrine: Negative  Genitourinary: Positive for vaginal bleeding  Musculoskeletal: Negative  Skin: Negative  Neurological: Negative  Hematological: Negative  Psychiatric/Behavioral: Negative  Current Outpatient Medications   Medication Sig Dispense Refill    etonogestrel (Nexplanon) subdermal implant 68 mg by Subdermal route once      multivitamin (THERAGRAN) TABS Take 1 tablet by mouth daily      phentermine (ADIPEX-P) 37 5 MG tablet Take 37 5 mg by mouth daily in the early morning Takes 1/2 tablet daily every AM (Patient not taking: Reported on 7/19/2021)  0     No current facility-administered medications for this visit  Objective:    Blood pressure 138/64, pulse 84, temperature (!) 97 4 °F (36 3 °C), resp  rate 18, height 5' 6" (1 676 m), weight 68 9 kg (152 lb)  Body mass index is 24 53 kg/m²  Body surface area is 1 78 meters squared  Physical Exam  Constitutional:       Appearance: She is well-developed  HENT:      Head: Normocephalic and atraumatic  Eyes:      Pupils: Pupils are equal, round, and reactive to light  Cardiovascular:      Rate and Rhythm: Normal rate and regular rhythm  Heart sounds: Normal heart sounds  Pulmonary:      Effort: Pulmonary effort is normal  No respiratory distress  Breath sounds: Normal breath sounds  Abdominal:      General: Bowel sounds are normal  There is no distension  Palpations: Abdomen is soft  Abdomen is not rigid  Tenderness: There is no abdominal tenderness  There is no guarding or rebound  Genitourinary:     Comments: -Normal external female genitalia, normal Bartholin's and [place]'s glands                  -Normal midline urethral meatus  No lesions notes                  -Bladder without fullness mass or tenderness                  -Vagina without lesion or discharge No significant cystocele or rectocele noted                  -Cervix normal appearing without visible lesions                  -Uterus with normal contour, mobility   No tenderness,                  -Adnexae without  mass or tenderness                  - Anus without fissure of lesion  No evidence of recurrent vulvar dysplasia  Incision site on the left vulva is unremarkable  Musculoskeletal:         General: Normal range of motion  Cervical back: Normal range of motion and neck supple  Lymphadenopathy:      Cervical: No cervical adenopathy  Upper Body:      Right upper body: No supraclavicular adenopathy  Left upper body: No supraclavicular adenopathy  Skin:     General: Skin is warm and dry  Neurological:      Mental Status: She is alert and oriented to person, place, and time     Psychiatric:         Behavior: Behavior normal          No results found for:   Lab Results   Component Value Date    K 4 6 10/19/2020     10/19/2020    CO2 27 10/19/2020    BUN 13 10/19/2020    CREATININE 1 05 10/19/2020    GLUF 100 (H) 06/22/2020    CALCIUM 9 4 10/19/2020    AST 19 10/19/2020    ALT 30 10/19/2020    ALKPHOS 55 10/19/2020    EGFR 65 10/19/2020     Lab Results   Component Value Date    WBC 6 75 04/20/2021    HGB 14 0 04/20/2021    HCT 42 9 04/20/2021    MCV 90 04/20/2021     04/20/2021     Lab Results   Component Value Date    NEUTROABS 3 73 04/20/2021        Trend:  No results found for:

## 2021-08-11 NOTE — ASSESSMENT & PLAN NOTE
Patient has a history of mild dysplasia  Repeat Pap smears performed she will follow-up with Dr Hermila Brooke for continued management  Also with regard to irregular bleeding and Norplant patient will follow-up with Dr Hermila Brooke she can see us on an as-needed basis

## 2021-08-17 LAB
LAB AP GYN PRIMARY INTERPRETATION: NORMAL
Lab: NORMAL

## 2021-09-07 ENCOUNTER — OFFICE VISIT (OUTPATIENT)
Dept: OBGYN CLINIC | Facility: MEDICAL CENTER | Age: 46
End: 2021-09-07
Payer: COMMERCIAL

## 2021-09-07 VITALS — SYSTOLIC BLOOD PRESSURE: 126 MMHG | BODY MASS INDEX: 24.44 KG/M2 | WEIGHT: 151.4 LBS | DIASTOLIC BLOOD PRESSURE: 78 MMHG

## 2021-09-07 DIAGNOSIS — Z30.09 CONSULTATION FOR FEMALE STERILIZATION: Primary | ICD-10-CM

## 2021-09-07 PROCEDURE — 99213 OFFICE O/P EST LOW 20 MIN: CPT | Performed by: STUDENT IN AN ORGANIZED HEALTH CARE EDUCATION/TRAINING PROGRAM

## 2021-09-07 NOTE — PROGRESS NOTES
Assessment/Plan:     41 yo  desires permanent sterilization in the form of bilateral salpingectomy  Petra Cancino is aware of alternative options - these have been discussed extensively in the past  Rebeca Hernandez will be removed at time of tubal  We discussed that she should not experience hormonal changes following tubal  She will likely go back to having periods similarly to prior to taking hormones  We discussed risks including bleeding, infection, injury to surrounding structures such as bowel, bladder, ovaries, uterus, nerves and veins  We discussed the advantages of opportunistic salpingectomy  We discussed that this procedure is permanent and cannot be reversed  She is agreeable to proceeding and signed consent  Subjective:     Patient ID: Corrine Montemayor is a 42 y o  female  41 yo  presents to discuss tubal ligation for contraception  She currently has a nexplanon but has irregular bleeding  She is positive she doesn't want more children  She is concerned about hormonal changes following tubal       Review of Systems   All other systems reviewed and are negative  Objective:     Physical Exam  Vitals reviewed  Pulmonary:      Effort: Pulmonary effort is normal    Neurological:      Mental Status: She is alert and oriented to person, place, and time     Psychiatric:         Behavior: Behavior normal

## 2021-11-22 ENCOUNTER — APPOINTMENT (OUTPATIENT)
Dept: LAB | Facility: HOSPITAL | Age: 46
End: 2021-11-22
Payer: COMMERCIAL

## 2021-11-22 DIAGNOSIS — Z01.818 PRE-OP TESTING: Primary | ICD-10-CM

## 2021-11-22 LAB
ABO GROUP BLD: NORMAL
ANION GAP SERPL CALCULATED.3IONS-SCNC: 9 MMOL/L (ref 4–13)
BLD GP AB SCN SERPL QL: NEGATIVE
BUN SERPL-MCNC: 15 MG/DL (ref 5–25)
CALCIUM SERPL-MCNC: 9 MG/DL (ref 8.3–10.1)
CHLORIDE SERPL-SCNC: 105 MMOL/L (ref 100–108)
CO2 SERPL-SCNC: 25 MMOL/L (ref 21–32)
CREAT SERPL-MCNC: 0.89 MG/DL (ref 0.6–1.3)
ERYTHROCYTE [DISTWIDTH] IN BLOOD BY AUTOMATED COUNT: 12.7 % (ref 11.6–15.1)
GFR SERPL CREATININE-BSD FRML MDRD: 79 ML/MIN/1.73SQ M
GLUCOSE P FAST SERPL-MCNC: 90 MG/DL (ref 65–99)
HCT VFR BLD AUTO: 45.4 % (ref 34.8–46.1)
HGB BLD-MCNC: 14.6 G/DL (ref 11.5–15.4)
MCH RBC QN AUTO: 29.7 PG (ref 26.8–34.3)
MCHC RBC AUTO-ENTMCNC: 32.2 G/DL (ref 31.4–37.4)
MCV RBC AUTO: 92 FL (ref 82–98)
PLATELET # BLD AUTO: 237 THOUSANDS/UL (ref 149–390)
PMV BLD AUTO: 11.3 FL (ref 8.9–12.7)
POTASSIUM SERPL-SCNC: 3.6 MMOL/L (ref 3.5–5.3)
RBC # BLD AUTO: 4.92 MILLION/UL (ref 3.81–5.12)
RH BLD: POSITIVE
SODIUM SERPL-SCNC: 139 MMOL/L (ref 136–145)
SPECIMEN EXPIRATION DATE: NORMAL
WBC # BLD AUTO: 6.08 THOUSAND/UL (ref 4.31–10.16)

## 2021-11-22 PROCEDURE — 80048 BASIC METABOLIC PNL TOTAL CA: CPT

## 2021-11-22 PROCEDURE — 86900 BLOOD TYPING SEROLOGIC ABO: CPT

## 2021-11-22 PROCEDURE — 86850 RBC ANTIBODY SCREEN: CPT

## 2021-11-22 PROCEDURE — 36415 COLL VENOUS BLD VENIPUNCTURE: CPT

## 2021-11-22 PROCEDURE — 86901 BLOOD TYPING SEROLOGIC RH(D): CPT

## 2021-11-22 PROCEDURE — 85027 COMPLETE CBC AUTOMATED: CPT

## 2021-11-29 ENCOUNTER — ANESTHESIA EVENT (OUTPATIENT)
Dept: PERIOP | Facility: HOSPITAL | Age: 46
End: 2021-11-29
Payer: COMMERCIAL

## 2021-11-30 ENCOUNTER — ANESTHESIA (OUTPATIENT)
Dept: PERIOP | Facility: HOSPITAL | Age: 46
End: 2021-11-30
Payer: COMMERCIAL

## 2021-11-30 ENCOUNTER — HOSPITAL ENCOUNTER (OUTPATIENT)
Facility: HOSPITAL | Age: 46
Setting detail: OUTPATIENT SURGERY
Discharge: HOME | End: 2021-11-30
Attending: STUDENT IN AN ORGANIZED HEALTH CARE EDUCATION/TRAINING PROGRAM | Admitting: STUDENT IN AN ORGANIZED HEALTH CARE EDUCATION/TRAINING PROGRAM
Payer: COMMERCIAL

## 2021-11-30 VITALS
HEART RATE: 55 BPM | DIASTOLIC BLOOD PRESSURE: 64 MMHG | RESPIRATION RATE: 16 BRPM | WEIGHT: 149.25 LBS | BODY MASS INDEX: 28.18 KG/M2 | HEIGHT: 61 IN | TEMPERATURE: 98.5 F | SYSTOLIC BLOOD PRESSURE: 104 MMHG | OXYGEN SATURATION: 99 %

## 2021-11-30 DIAGNOSIS — J01.00 ACUTE NON-RECURRENT MAXILLARY SINUSITIS: Primary | ICD-10-CM

## 2021-11-30 DIAGNOSIS — Z30.2 STERILIZATION: ICD-10-CM

## 2021-11-30 LAB
EXT PREGNANCY TEST URINE: NEGATIVE
EXT. CONTROL: NORMAL

## 2021-11-30 PROCEDURE — 88302 TISSUE EXAM BY PATHOLOGIST: CPT | Performed by: PATHOLOGY

## 2021-11-30 PROCEDURE — 58661 LAPAROSCOPY REMOVE ADNEXA: CPT | Performed by: STUDENT IN AN ORGANIZED HEALTH CARE EDUCATION/TRAINING PROGRAM

## 2021-11-30 PROCEDURE — 81025 URINE PREGNANCY TEST: CPT | Performed by: STUDENT IN AN ORGANIZED HEALTH CARE EDUCATION/TRAINING PROGRAM

## 2021-11-30 PROCEDURE — NC001 PR NO CHARGE: Performed by: STUDENT IN AN ORGANIZED HEALTH CARE EDUCATION/TRAINING PROGRAM

## 2021-11-30 PROCEDURE — 11982 REMOVE DRUG IMPLANT DEVICE: CPT | Performed by: STUDENT IN AN ORGANIZED HEALTH CARE EDUCATION/TRAINING PROGRAM

## 2021-11-30 RX ORDER — ROCURONIUM BROMIDE 10 MG/ML
INJECTION, SOLUTION INTRAVENOUS AS NEEDED
Status: DISCONTINUED | OUTPATIENT
Start: 2021-11-30 | End: 2021-11-30

## 2021-11-30 RX ORDER — DEXAMETHASONE SODIUM PHOSPHATE 4 MG/ML
INJECTION, SOLUTION INTRA-ARTICULAR; INTRALESIONAL; INTRAMUSCULAR; INTRAVENOUS; SOFT TISSUE AS NEEDED
Status: DISCONTINUED | OUTPATIENT
Start: 2021-11-30 | End: 2021-11-30

## 2021-11-30 RX ORDER — DIPHENHYDRAMINE HYDROCHLORIDE 50 MG/ML
12.5 INJECTION INTRAMUSCULAR; INTRAVENOUS ONCE AS NEEDED
Status: DISCONTINUED | OUTPATIENT
Start: 2021-11-30 | End: 2021-11-30 | Stop reason: HOSPADM

## 2021-11-30 RX ORDER — MIDAZOLAM HYDROCHLORIDE 2 MG/2ML
INJECTION, SOLUTION INTRAMUSCULAR; INTRAVENOUS AS NEEDED
Status: DISCONTINUED | OUTPATIENT
Start: 2021-11-30 | End: 2021-11-30

## 2021-11-30 RX ORDER — PROPOFOL 10 MG/ML
INJECTION, EMULSION INTRAVENOUS AS NEEDED
Status: DISCONTINUED | OUTPATIENT
Start: 2021-11-30 | End: 2021-11-30

## 2021-11-30 RX ORDER — NEOSTIGMINE METHYLSULFATE 1 MG/ML
INJECTION INTRAVENOUS AS NEEDED
Status: DISCONTINUED | OUTPATIENT
Start: 2021-11-30 | End: 2021-11-30

## 2021-11-30 RX ORDER — ONDANSETRON 2 MG/ML
INJECTION INTRAMUSCULAR; INTRAVENOUS AS NEEDED
Status: DISCONTINUED | OUTPATIENT
Start: 2021-11-30 | End: 2021-11-30

## 2021-11-30 RX ORDER — FENTANYL CITRATE/PF 50 MCG/ML
50 SYRINGE (ML) INJECTION
Status: DISCONTINUED | OUTPATIENT
Start: 2021-11-30 | End: 2021-11-30 | Stop reason: HOSPADM

## 2021-11-30 RX ORDER — AMOXICILLIN 875 MG/1
875 TABLET, COATED ORAL 2 TIMES DAILY
Qty: 10 TABLET | Refills: 0 | Status: SHIPPED | OUTPATIENT
Start: 2021-11-30 | End: 2021-12-05

## 2021-11-30 RX ORDER — FENTANYL CITRATE 50 UG/ML
INJECTION, SOLUTION INTRAMUSCULAR; INTRAVENOUS AS NEEDED
Status: DISCONTINUED | OUTPATIENT
Start: 2021-11-30 | End: 2021-11-30

## 2021-11-30 RX ORDER — KETOROLAC TROMETHAMINE 30 MG/ML
INJECTION, SOLUTION INTRAMUSCULAR; INTRAVENOUS AS NEEDED
Status: DISCONTINUED | OUTPATIENT
Start: 2021-11-30 | End: 2021-11-30

## 2021-11-30 RX ORDER — GLYCOPYRROLATE 0.2 MG/ML
INJECTION INTRAMUSCULAR; INTRAVENOUS AS NEEDED
Status: DISCONTINUED | OUTPATIENT
Start: 2021-11-30 | End: 2021-11-30

## 2021-11-30 RX ORDER — OXYCODONE HYDROCHLORIDE 5 MG/1
5 TABLET ORAL EVERY 4 HOURS PRN
Qty: 5 TABLET | Refills: 0 | Status: SHIPPED | OUTPATIENT
Start: 2021-11-30

## 2021-11-30 RX ORDER — BUPIVACAINE HYDROCHLORIDE 5 MG/ML
INJECTION, SOLUTION EPIDURAL; INTRACAUDAL AS NEEDED
Status: DISCONTINUED | OUTPATIENT
Start: 2021-11-30 | End: 2021-11-30 | Stop reason: HOSPADM

## 2021-11-30 RX ORDER — ONDANSETRON 2 MG/ML
4 INJECTION INTRAMUSCULAR; INTRAVENOUS ONCE AS NEEDED
Status: DISCONTINUED | OUTPATIENT
Start: 2021-11-30 | End: 2021-11-30 | Stop reason: HOSPADM

## 2021-11-30 RX ORDER — SODIUM CHLORIDE, SODIUM LACTATE, POTASSIUM CHLORIDE, CALCIUM CHLORIDE 600; 310; 30; 20 MG/100ML; MG/100ML; MG/100ML; MG/100ML
125 INJECTION, SOLUTION INTRAVENOUS CONTINUOUS
Status: DISCONTINUED | OUTPATIENT
Start: 2021-11-30 | End: 2021-11-30 | Stop reason: HOSPADM

## 2021-11-30 RX ORDER — LIDOCAINE HYDROCHLORIDE 10 MG/ML
INJECTION, SOLUTION EPIDURAL; INFILTRATION; INTRACAUDAL; PERINEURAL AS NEEDED
Status: DISCONTINUED | OUTPATIENT
Start: 2021-11-30 | End: 2021-11-30

## 2021-11-30 RX ORDER — EPHEDRINE SULFATE 50 MG/ML
INJECTION INTRAVENOUS AS NEEDED
Status: DISCONTINUED | OUTPATIENT
Start: 2021-11-30 | End: 2021-11-30

## 2021-11-30 RX ORDER — MORPHINE SULFATE 4 MG/ML
2 INJECTION, SOLUTION INTRAMUSCULAR; INTRAVENOUS
Status: DISCONTINUED | OUTPATIENT
Start: 2021-11-30 | End: 2021-11-30 | Stop reason: HOSPADM

## 2021-11-30 RX ADMIN — FENTANYL CITRATE 50 MCG: 50 INJECTION INTRAMUSCULAR; INTRAVENOUS at 11:15

## 2021-11-30 RX ADMIN — FENTANYL CITRATE 50 MCG: 50 INJECTION INTRAMUSCULAR; INTRAVENOUS at 11:08

## 2021-11-30 RX ADMIN — DEXAMETHASONE SODIUM PHOSPHATE 4 MG: 4 INJECTION, SOLUTION INTRAMUSCULAR; INTRAVENOUS at 11:15

## 2021-11-30 RX ADMIN — FENTANYL CITRATE 50 MCG: 0.05 INJECTION, SOLUTION INTRAMUSCULAR; INTRAVENOUS at 12:44

## 2021-11-30 RX ADMIN — NEOSTIGMINE METHYLSULFATE 3 MG: 1 INJECTION INTRAVENOUS at 12:21

## 2021-11-30 RX ADMIN — PROPOFOL 180 MG: 10 INJECTION, EMULSION INTRAVENOUS at 11:16

## 2021-11-30 RX ADMIN — SODIUM CHLORIDE, SODIUM LACTATE, POTASSIUM CHLORIDE, AND CALCIUM CHLORIDE 125 ML/HR: .6; .31; .03; .02 INJECTION, SOLUTION INTRAVENOUS at 09:29

## 2021-11-30 RX ADMIN — GLYCOPYRROLATE 0.2 MG: 0.2 INJECTION, SOLUTION INTRAMUSCULAR; INTRAVENOUS at 11:46

## 2021-11-30 RX ADMIN — MIDAZOLAM 2 MG: 1 INJECTION INTRAMUSCULAR; INTRAVENOUS at 11:08

## 2021-11-30 RX ADMIN — SODIUM CHLORIDE, SODIUM LACTATE, POTASSIUM CHLORIDE, AND CALCIUM CHLORIDE: .6; .31; .03; .02 INJECTION, SOLUTION INTRAVENOUS at 12:04

## 2021-11-30 RX ADMIN — EPHEDRINE SULFATE 5 MG: 50 INJECTION, SOLUTION INTRAVENOUS at 11:22

## 2021-11-30 RX ADMIN — KETOROLAC TROMETHAMINE 15 MG: 30 INJECTION, SOLUTION INTRAMUSCULAR at 12:15

## 2021-11-30 RX ADMIN — LIDOCAINE HYDROCHLORIDE 50 MG: 10 INJECTION, SOLUTION EPIDURAL; INFILTRATION; INTRACAUDAL; PERINEURAL at 11:15

## 2021-11-30 RX ADMIN — ONDANSETRON 4 MG: 2 INJECTION INTRAMUSCULAR; INTRAVENOUS at 11:07

## 2021-11-30 RX ADMIN — GLYCOPYRROLATE 0.6 MG: 0.2 INJECTION, SOLUTION INTRAMUSCULAR; INTRAVENOUS at 12:21

## 2021-11-30 RX ADMIN — ROCURONIUM BROMIDE 40 MG: 50 INJECTION, SOLUTION INTRAVENOUS at 11:16

## 2021-12-14 ENCOUNTER — OFFICE VISIT (OUTPATIENT)
Dept: OBGYN CLINIC | Facility: MEDICAL CENTER | Age: 46
End: 2021-12-14

## 2021-12-14 VITALS
SYSTOLIC BLOOD PRESSURE: 120 MMHG | DIASTOLIC BLOOD PRESSURE: 70 MMHG | WEIGHT: 153.8 LBS | HEIGHT: 61 IN | BODY MASS INDEX: 29.04 KG/M2

## 2021-12-14 DIAGNOSIS — Z90.79 STATUS POST BILATERAL SALPINGECTOMY: Primary | ICD-10-CM

## 2021-12-14 PROCEDURE — 99024 POSTOP FOLLOW-UP VISIT: CPT | Performed by: STUDENT IN AN ORGANIZED HEALTH CARE EDUCATION/TRAINING PROGRAM

## 2022-03-29 ENCOUNTER — OFFICE VISIT (OUTPATIENT)
Dept: OBGYN CLINIC | Facility: MEDICAL CENTER | Age: 47
End: 2022-03-29
Payer: COMMERCIAL

## 2022-03-29 VITALS
WEIGHT: 157.2 LBS | SYSTOLIC BLOOD PRESSURE: 124 MMHG | HEIGHT: 61 IN | DIASTOLIC BLOOD PRESSURE: 76 MMHG | BODY MASS INDEX: 29.68 KG/M2

## 2022-03-29 DIAGNOSIS — N93.9 ABNORMAL UTERINE BLEEDING (AUB): Primary | ICD-10-CM

## 2022-03-29 PROCEDURE — 99213 OFFICE O/P EST LOW 20 MIN: CPT | Performed by: STUDENT IN AN ORGANIZED HEALTH CARE EDUCATION/TRAINING PROGRAM

## 2022-03-29 RX ORDER — ACETAMINOPHEN AND CODEINE PHOSPHATE 120; 12 MG/5ML; MG/5ML
1 SOLUTION ORAL DAILY
Qty: 90 TABLET | Refills: 3 | Status: SHIPPED | OUTPATIENT
Start: 2022-03-29

## 2022-03-29 NOTE — PROGRESS NOTES
Assessment/Plan:      Diagnoses and all orders for this visit:    Abnormal uterine bleeding (AUB)  We discussed treatment options - cannot have estrogen as smoker > 27 yo  Discussed POP, lysteda, ablation, and hysterectomy  Chose to try POP  Will call w/ continued symptoms, otherwise has annual in July  -     norethindrone (Ortho Micronor) 0 35 MG tablet; Take 1 tablet (0 35 mg total) by mouth daily          Subjective:     Patient ID: Bassam Grant is a 42 y o  female  41 yo  presents with heavy bleeding  Denise Aranda is s/p laparoscopic b/l salpingectomy and nexplanon removal in 2021  She reports heavy bleeding requiring changing of pad every 30 minutes for the first 2 days of her period  Prior to her period she has cramping and bloating  She would like to discuss treatment options  Pelvic US and TSH in 2021 were normal     She is a smoker  Review of Systems   All other systems reviewed and are negative  Objective:     Physical Exam  Vitals reviewed  Neurological:      Mental Status: She is oriented to person, place, and time     Psychiatric:         Behavior: Behavior normal

## 2022-05-03 ENCOUNTER — TELEPHONE (OUTPATIENT)
Dept: OBGYN CLINIC | Facility: CLINIC | Age: 47
End: 2022-05-03

## 2022-05-03 DIAGNOSIS — B37.9 YEAST INFECTION: Primary | ICD-10-CM

## 2022-05-03 RX ORDER — FLUCONAZOLE 150 MG/1
TABLET ORAL
Qty: 2 TABLET | Refills: 0 | Status: SHIPPED | OUTPATIENT
Start: 2022-05-03 | End: 2023-05-03

## 2022-05-03 NOTE — TELEPHONE ENCOUNTER
Pt is having itching, burning, a little bit of odor  And swollen in the vaginal area   Wants to know if she can get an Rx as over the counter prescription did not help    Please advise

## 2022-05-03 NOTE — TELEPHONE ENCOUNTER
Pt began 4 days ago with justinet christopher samuels, I&B, and fishy odor  Used monostat 3, last being 2 days ago with no improvement    Pharm, [facility], [facility]  req rx

## 2022-11-18 ENCOUNTER — TELEPHONE (OUTPATIENT)
Dept: OBGYN CLINIC | Facility: CLINIC | Age: 47
End: 2022-11-18

## 2022-12-02 NOTE — PROGRESS NOTES
Diagnoses and all orders for this visit:    Encounter for gynecological examination without abnormal finding  -     Liquid-based pap, screening    Encounter for screening mammogram for malignant neoplasm of breast  -     Mammo screening bilateral w 3d & cad; Future    Colon cancer screening  -     Ambulatory referral to Gastroenterology; Future        Perineal hygiene reviewed   Weight bearing exercises minium of 150 mins/weekly advised  Kegel exercises recommended  SBE encouraged, ASCCP guidelines reviewed  Condoms encouraged with all sexual activity to prevent STI's  Gardisil vaccines recommended up to age 39  Calcium/ Vit D dietary requirements discussed,   Advised to call with any issues,  all concerns & questions addressed  See provided information in your after visit summary     F/U Annually and PRN      Health Maintenance:    Last PAP: 7/13/20 ASCUS/ HPV 16+, Colpo with ANAIS 1, 7/19/21 Neg/Neg, 08/11/2021 Neg  Next PAP Due:collected today     Last Mammogram: 05/11/2021    Life time Silvia Helm % 14 1, Density D extremely dense , Bi-Rads 2 Benign  Next Mammogram: order given    Last Colonoscopy: Not on file    advised age 41, referral given     Gardisil:  Not completed       Subjective    CC: Yearly Exam      Yuni Sin is a 41 y o  female here for an annual exam  [id number]  GYN hx includes:  vulvectomy for PARRIS 111 in 2019, bilateral salpingectomy  No personal Hx of breast, cervical, ovarian or colon CA  Family hx of:  No GYN cancers  Medically stable, reports no changes in medical Hx, follows with PMD    Patient's last menstrual period was 11/18/2022  Her menstrual cycles are regular every 28-30 days  She denies issues with bleeding or her menses  Her cycles have regulated back to normal however she now mentions abd pain and bloating after each cycle for the past few months  advised to monitor for increased symptoms and or resolution   Reach out to office if symptoms continue or become worse  Reports history of abnormal pap smears  She denies breast concerns, abnormal vaginal discharge, vaginal itching, odor, irritation, bowel/bladder dysfunction, urinary symptoms, pelvic pain, or dyspareunia today  She is sexually active  Monogamous relationship  Her current method of contraception includes salpingectomy  Denies any issues with her BCM  João Sexton She does not want STD testing today  Denies intimate partner violence    Past Medical History:   Diagnosis Date   • Abnormal Pap smear of cervix    • Disease of thyroid gland     as a teenager   • Pneumonia    • Shingles 08/2020   • Varicella      Past Surgical History:   Procedure Laterality Date   • AUGMENTATION BREAST  2014   • AUGMENTATION MAMMAPLASTY Bilateral    • FOREIGN BODY REMOVAL Left 11/30/2021    Procedure: Doneta Sizer;  Surgeon: Vielka Echols MD;  Location: [facility];  Service: Gynecology   • DE LAP,RMV  ADNEXAL STRUCTURE N/A 11/30/2021    Procedure: OPERATIVE LAPAROSCOPY BILATERAL SALPINGECTOMY;  Surgeon: Vielka Echols MD;  Location: [facility];  Service: Gynecology   • DE PART SIMPLE [address] VULVA Bilateral 1/9/2020    Procedure: SIMPLE VULVECTOMY;  Surgeon: Edenilson Torres MD;  Location:  Main OR;  Service: Gynecology Oncology         There is no immunization history on file for this patient      Family History   Problem Relation Age of Onset   • Hypertension Mother    • Hypertension Father    • No Known Problems Daughter    • No Known Problems Son    • Ovarian cancer Neg Hx    • Breast cancer Neg Hx    • Colon cancer Neg Hx      Social History     Tobacco Use   • Smoking status: Every Day     Packs/day: 1 00     Types: Cigarettes   • Smokeless tobacco: Never   • Tobacco comments:     15 cigs per day   Vaping Use   • Vaping Use: Never used   Substance Use Topics   • Alcohol use: Not Currently     Comment: 2-3 x year   • Drug use: Never       Current Outpatient Medications:   •  multivitamin (Racheal Lovelace) TABS, Take 1 tablet by mouth daily, Disp: , Rfl:   •  fluconazole (DIFLUCAN) 150 mg tablet, Take 1 tablet  If no improvement in symptoms in 24 hours, take second tablet  (Patient not taking: Reported on 2022), Disp: 2 tablet, Rfl: 0  •  norethindrone (Ortho Micronor) 0 35 MG tablet, Take 1 tablet (0 35 mg total) by mouth daily (Patient not taking: Reported on 2022), Disp: 90 tablet, Rfl: 3  Patient Active Problem List    Diagnosis Date Noted   • Status post bilateral salpingectomy 2021   • Breast cancer screening by mammogram 2021   • Irregular menstrual bleeding 2021   • Dysplasia of cervix, low grade (ANAIS 1) 2020   • Herpes zoster without complication    • Tobacco use 2020   • PARRIS III (vulvar intraepithelial neoplasia III) 2019       No Known Allergies    OB History    Para Term  AB Living   2 2 2 0 0 2   SAB IAB Ectopic Multiple Live Births   0 0 0 0 2      # Outcome Date GA Lbr Luis E/2nd Weight Sex Delivery Anes PTL Lv   2 Term      Vag-Spont      1 Term      Vag-Spont          Vitals:    22 1401   BP: 110/74   BP Location: Left arm   Patient Position: Sitting   Cuff Size: Large   Weight: 68 kg (150 lb)   Height: 5' 1" (1 549 m)     Body mass index is 28 34 kg/m²  Review of Systems     Constitutional: Negative for chills, fatigue, fever, headaches, visual disturbances, and unexpected weight change  Respiratory: Negative for cough, & shortness of breath  Cardiovascular: Negative for chest pain       Gastrointestinal: Negative for Abd pain, nausea & vomiting, constipation and diarrhea  Genitourinary: Negative for difficulty urinating, dysuria, hematuria, dyspareunia, unusual vaginal bleeding or discharge  Skin: Negative skin changes    Physical Exam     Constitutional: Alert & Oriented x3, well-developed and well-nourished  No distress  HENT: Atraumatic, Normocephalic, Conjunctivae clear  Neck: Normal range of motion  Neck supple  No thyromegaly, mass, nodules or tenderness  Pulmonary: Effort normal    Abdominal: Soft  No tenderness or masses  Musculoskeletal: Normal ROM  Skin: Warm & Dry  Psychological: Normal mood, thought content, behavior & judgement     Breasts: bilateral augmentation   Right: tissue soft without masses, tenderness, skin changes or nipple discharge  No areas of erythema or pain  No subclavicular, axillary, pectoral adenopathy  Left:  tissue soft without masses, tenderness, skin changes or nipple discharge  No areas of erythema or pain  No subclavicular, axillary, pectoral adenopathy    Pelvic exam was performed with patient supine, lithotomy position  Labia:  S/p bilateral vulvectomy, well healed, minimal scarring noted  Negative rash, tenderness, lesion or injury on the right labia  Negative rash, tenderness, lesion or injury on the left labia  Urethral meatus:  Negative for  tenderness, inflammation or discharge  Uterus: not deviated, enlarged, fixed or tender  Cervix: No CMT, no discharge or friability  Right adnexa: no mass, no tenderness and no fullness  Left adnexa: no mass, no tenderness and no fullness  Vagina: No erythema, tenderness, masses, or foreign body in the vagina  No signs of injury around the vagina  No unusual vaginal discharge   Perineum without lesions, signs of injury, erythema or swelling  Inguinal Canal:        Right: No inguinal adenopathy or hernia present  Left: No inguinal adenopathy or hernia present

## 2022-12-02 NOTE — PATIENT INSTRUCTIONS
Breast Self Exam for Women   AMBULATORY CARE:   A breast self-exam (BSE)  is a way to check your breasts for lumps and other changes  Regular BSEs can help you know how your breasts normally look and feel  Most breast lumps or changes are not cancer, but you should always have them checked by a healthcare provider  Why you should do a BSE:  Breast cancer is the most common type of cancer in women  Even if you have mammograms, you may still want to do a BSE regularly  If you know how your breasts normally feel and look, it may help you know when to contact your healthcare provider  Mammograms can miss some cancers  You may find a lump during a BSE that did not show up on a mammogram   When you should do a BSE:  If you have periods, you may want to do your BSE 1 week after your period ends  This is the time when your breasts may be the least swollen, lumpy, or tender  You can do regular BSEs even if you are breastfeeding or have breast implants  Call your doctor if:   You find any lumps or changes in your breasts  You have breast pain or fluid coming from your nipples  You have questions or concerns about your condition or care  How to do a BSE:       Look at your breasts in a mirror  Look at the size and shape of each breast and nipple  Check for swelling, lumps, dimpling, scaly skin, or other skin changes  Look for nipple changes, such as a nipple that is painful or beginning to pull inward  Gently squeeze both nipples and check to see if fluid (that is not breast milk) comes out of them  If you find any of these or other breast changes, contact your healthcare provider  Check your breasts while you sit or  the following 3 positions:    [place] your arms down at your sides  Raise your hands and join them behind your head  Put firm pressure with your hands on your hips  Bend slightly forward while you look at your breasts in the mirror  Lie down and feel your breasts    When you lie down, your breast tissue spreads out evenly over your chest  This makes it easier for you to feel for lumps and anything that may not be normal for your breasts  Do a BSE on one breast at a time  Place a small pillow or towel under your left shoulder  Put your left arm behind your head  Use the 3 middle fingers of your right hand  Use your fingertip pads, on the top of your fingers  Your fingertip pad is the most sensitive part of your finger  Use small circles to feel your breast tissue  Use your fingertip pads to make dime-sized, overlapping circles on your breast and armpits  Use light, medium, and firm pressure  First, press lightly  Second, press with medium pressure to feel a little deeper into the breast  Last, use firm pressure to feel deep within your breast     Examine your entire breast area  Examine the breast area from above the breast to below the breast where you feel only ribs  Make small circles with your fingertips, starting in the middle of your armpit  Make circles going up and down the breast area  Continue toward your breast and all the way across it  Examine the area from your armpit all the way over to the middle of your chest (breastbone)  Stop at the middle of your chest     Move the pillow or towel to your right shoulder, and put your right arm behind your head  Use the 3 fingertip pads of your left hand, and repeat the above steps to do a BSE on your right breast     What else you can do to check for breast problems or cancer:  Talk to your healthcare provider about mammograms  A mammogram is an x-ray of your breasts to screen for breast cancer or other problems  Your provider can tell you the benefits and risks of mammograms  The first mammogram is usually at age 39 or 48  Your provider may recommend you start at 36 or younger if your risk for breast cancer is high  Mammograms usually continue every 1 to 2 years until age 76         Follow up with your doctor as directed:  Write down your questions so you remember to ask them during your visits  © Copyright [employer] 2022 Information is for End User's use only and may not be sold, redistributed or otherwise used for commercial purposes  All illustrations and images included in CareNotes® are the copyrighted property of A D A M , Inc  or Jonah Brizuela  The above information is an  only  It is not intended as medical advice for individual conditions or treatments  Talk to your doctor, nurse or pharmacist before following any medical regimen to see if it is safe and effective for you  Wellness Visit for Adults   AMBULATORY CARE:   A wellness visit  is when you see your healthcare provider to get screened for health problems  Your healthcare provider will also give you advice on how to stay healthy  Write down your questions so you remember to ask them  Ask your healthcare provider how often you should have a wellness visit  What happens at a wellness visit:  Your healthcare provider will ask about your health, and your family history of health problems  This includes high blood pressure, heart disease, and cancer  He or she will ask if you have symptoms that concern you, if you smoke, and about your mood  You may also be asked about your intake of medicines, supplements, food, and alcohol  Any of the following may be done: Your weight  will be checked  Your height may also be checked so your body mass index (BMI) can be calculated  Your BMI shows if you are at a healthy weight  Your blood pressure  and heart rate will be checked  Your temperature may also be checked  Blood and urine tests  may be done  Blood tests may be done to check your cholesterol levels  Abnormal cholesterol levels increase your risk for heart disease and stroke  You may also need a blood or urine test to check for diabetes if you are at increased risk  Urine tests may be done to look for signs of an infection or kidney disease      A physical exam  includes checking your heartbeat and lungs with a stethoscope  Your healthcare provider may also check your skin to look for sun damage  Screening tests  may be recommended  A screening test is done to check for diseases that may not cause symptoms  The screening tests you may need depend on your age, gender, family history, and lifestyle habits  For example, colorectal screening may be recommended if you are 48years old or older  Screening tests you need if you are a woman:   A Pap smear  is used to screen for cervical cancer  Pap smears are usually done every 3 to 5 years depending on your age  You may need them more often if you have had abnormal Pap smear test results in the past  Ask your healthcare provider how often you should have a Pap smear  A mammogram  is an x-ray of your breasts to screen for breast cancer  Experts recommend mammograms every 2 years starting at age 48 years  You may need a mammogram at age 52 years or younger if you have an increased risk for breast cancer  Talk to your healthcare provider about when you should start having mammograms and how often you need them  Vaccines you may need:   Get an influenza vaccine  every year  The influenza vaccine protects you from the flu  Several types of viruses cause the flu  The viruses change over time, so new vaccines are made each year  Get a tetanus-diphtheria (Td) booster vaccine  every 10 years  This vaccine protects you against tetanus and diphtheria  Tetanus is a severe infection that may cause painful muscle spasms and lockjaw  Diphtheria is a severe bacterial infection that causes a thick covering in the back of your mouth and throat  Get a human papillomavirus (HPV) vaccine  if you are female and aged 23 to 32 or male 23 to 24 and never received it  This vaccine protects you from HPV infection  HPV is the most common infection spread by sexual contact   HPV may also cause vaginal, penile, and anal cancers  Get a pneumococcal vaccine  if you are aged 72 years or older  The pneumococcal vaccine is an injection given to protect you from pneumococcal disease  Pneumococcal disease is an infection caused by pneumococcal bacteria  The infection may cause pneumonia, meningitis, or an ear infection  Get a shingles vaccine  if you are 60 or older, even if you have had shingles before  The shingles vaccine is an injection to protect you from the varicella-zoster virus  This is the same virus that causes chickenpox  Shingles is a painful rash that develops in people who had chickenpox or have been exposed to the virus  How to eat healthy:  My Plate is a model for planning healthy meals  It shows the types and amounts of foods that should go on your plate  Fruits and vegetables make up about half of your plate, and grains and protein make up the other half  A serving of dairy is included on the side of your plate  The amount of calories and serving sizes you need depends on your age, gender, weight, and height  Examples of healthy foods are listed below:  Eat a variety of vegetables  such as dark green, red, and orange vegetables  You can also include canned vegetables low in sodium (salt) and frozen vegetables without added butter or sauces  Eat a variety of fresh fruits , canned fruit in 100% juice, frozen fruit, and dried fruit  Include whole grains  At least half of the grains you eat should be whole grains  Examples include whole-wheat bread, wheat pasta, brown rice, and whole-grain cereals such as oatmeal     Eat a variety of protein foods such as seafood (fish and shellfish), lean meat, and poultry without skin (turkey and chicken)  Examples of lean meats include pork leg, shoulder, or tenderloin, and beef round, sirloin, tenderloin, and extra lean ground beef  Other protein foods include eggs and egg substitutes, beans, peas, soy products, nuts, and seeds      Choose low-fat dairy products such as skim or 1% milk or low-fat yogurt, cheese, and cottage cheese  Limit unhealthy fats  such as butter, hard margarine, and shortening  Exercise:  Exercise at least 30 minutes per day on most days of the week  Some examples of exercise include walking, biking, dancing, and swimming  You can also fit in more physical activity by taking the stairs instead of the elevator or parking farther away from stores  Include muscle strengthening activities 2 days each week  Regular exercise provides many health benefits  It helps you manage your weight, and decreases your risk for type 2 diabetes, heart disease, stroke, and high blood pressure  Exercise can also help improve your mood  Ask your healthcare provider about the best exercise plan for you  General health and safety guidelines:   Do not smoke  Nicotine and other chemicals in cigarettes and cigars can cause lung damage  Ask your healthcare provider for information if you currently smoke and need help to quit  E-cigarettes or smokeless tobacco still contain nicotine  Talk to your healthcare provider before you use these products  Limit alcohol  A drink of alcohol is 12 ounces of beer, 5 ounces of wine, or 1½ ounces of liquor  Lose weight, if needed  Being overweight increases your risk of certain health conditions  These include heart disease, high blood pressure, type 2 diabetes, and certain types of cancer  Protect your skin  Do not sunbathe or use tanning beds  Use sunscreen with a SPF 15 or higher  Apply sunscreen at least 15 minutes before you go outside  Reapply sunscreen every 2 hours  Wear protective clothing, hats, and sunglasses when you are outside  Drive safely  Always wear your seatbelt  Make sure everyone in your car wears a seatbelt  A seatbelt can save your life if you are in an accident  Do not use your cell phone when you are driving  This could distract you and cause an accident   Pull over if you need to make a call or send a text message  Practice safe sex  Use latex condoms if are sexually active and have more than one partner  Your healthcare provider may recommend screening tests for sexually transmitted infections (STIs)  Wear helmets, lifejackets, and protective gear  Always wear a helmet when you ride a bike or motorcycle, go skiing, or play sports that could cause a head injury  Wear protective equipment when you play sports  Wear a lifejacket when you are on a boat or doing water sports  © Copyright [employer] 2022 Information is for End User's use only and may not be sold, redistributed or otherwise used for commercial purposes  All illustrations and images included in CareNotes® are the copyrighted property of A D A M , Inc  or [facility] Laurita Trammell   The above information is an  only  It is not intended as medical advice for individual conditions or treatments  Talk to your doctor, nurse or pharmacist before following any medical regimen to see if it is safe and effective for you  Kegel Exercises for Women   AMBULATORY CARE:   Kegel exercises  help strengthen your pelvic muscles  Pelvic muscles hold your pelvic organs, such as your bladder and uterus, in place  Kegel exercises help prevent or control problems with urine incontinence (leakage)  Incontinence may be caused by pregnancy, childbirth, or menopause  Contact your healthcare provider if:   You cannot feel your muscles tighten or relax  You continue to leak urine  You have questions or concerns about your condition or care  Use the correct muscles:  Pelvic muscles are the muscles you use to control urine flow  To target these muscles, stop and start the flow of urine several times  This will help you become familiar with how it feels to tighten and relax these muscles  How to do Kegel exercises:   Empty your bladder  You may lie down, stand up, or sit down to do these exercises   When you first try to do these exercises, it may be easier if you lie down  Tighten or squeeze your pelvic muscles slowly  It may feel like you are trying to hold back urine or gas  Hold this position for 3 seconds  Relax for 3 seconds  Repeat this cycle 10 times  Do 10 sets of Kegel exercises, at least 3 times a day  Do not hold your breath when you do Kegel exercises  Keep your stomach, back, and leg muscles relaxed  As your muscles get stronger, you will be able to hold the squeeze longer  Your healthcare provider may ask that you increase your pelvic muscle squeeze to 10 seconds  After you squeeze for 10 seconds, relax for 10 seconds  What else you should know:   Once you know how to do Kegel exercises, use different positions  You can do these exercises while you lie on the floor, sit at your desk or watch TV, and while you stand  You may notice improved bladder control within about 6 weeks  Tighten your pelvic muscles before you sneeze, cough, or lift to prevent urine leakage  Follow up with your doctor as directed:  Write down your questions so you remember to ask them during your visits  © Copyright [employer] 2022 Information is for End User's use only and may not be sold, redistributed or otherwise used for commercial purposes  All illustrations and images included in CareNotes® are the copyrighted property of A D A M , Inc  or [facility]  The above information is an  only  It is not intended as medical advice for individual conditions or treatments  Talk to your doctor, nurse or pharmacist before following any medical regimen to see if it is safe and effective for you  Perineal Hygiene      Your vaginal naturally takes care of its self, it is a self washing system, the less you mess the healthier it will be     No soaps or feminine wash to the vulva, these products can cause dermitis, bacterial infections and other vulvar problems     Use only water to cleanse, or water with Hyder or Hyder Sensitive Skin Bar soap if necessary  No scented lotions or products are advised in or near your vulva  Use only coconut oil for moisture if needed  No douching this may cause imbalance in your vaginal PH and further issues  If you wear panty liners, you may apply a thin coating of Vaseline, A&D ointment or coconut oil to the vulvar tissues as a skin barrier     [place]otton underware, loose fitting clothing  Only perfume-free, dye-free laundry detergent, use a second rinse cycle   Avoid fabric softeners/dryer sheets  Partner should avoid the same products as well  Over the counter probiotic to restore vaginal aubrey may be helpful as well, take daily  You may also look into Boric Acid vaginal suppositories to restore vaginal PH balance for up to 2 weeks as directed on the box  You may not use these if you are pregnant      For vaginal dryness: You may use:     Coconut oil (organic, pure, unscented) as needed for moisture or lubrication  ( Do not use if allergic)       Replens moisture restore external comfort gel daily ( use as directed on the box)        Replens long lasting vaginal moisturizer  ( use as directed on the box)         For Vaginal Lubrication:          You may use:     Coconut oil (organic, pure, unscented) as a lubricant or another scent-free lubricant (Astroglide, Uberlube) if needed  Do not use coconut oil or silicone if using a condom as this may break down the integrity of the condom and cause an unplanned pregnancy              Do not use coconut oil if allergic               Replens silky smooth lubricant, premium silicone based lubricant for intercourse  ( use as directed, a small amount will provide an enhanced natural feeling)     Any premium over the counter vaginal lubricant water or silicone based  Silicone based will have more staying power  Menopause   WHAT YOU NEED TO KNOW:   What is menopause?   Menopause is a normal stage in a woman's life when her monthly periods stop  You are considered to be in menopause when you have not had a period for a full year after the age of 36  Menopause usually occurs between ages 52 to 48  Perimenopause is a stage before menopause that may cause signs and symptoms similar to menopause  Perimenopause may start about 4 years before menopause  What causes menopause? Menopause starts when the ovaries stop making the female hormones estrogen and progesterone  After menopause, you are no longer able to become pregnant  Any of the following may trigger menopause or early menopause:  Surgery, including a hysterectomy or oophorectomy    Family history of early menopause    Smoking    Chemotherapy or pelvic radiation    Chromosome abnormalities, including Hill syndrome and Fragile X syndrome    Premature ovarian insufficiency (the ovaries stop producing eggs before age 36)    What are the signs and symptoms of menopause? You may have any of the following:  Irregular menstrual cycles with heavy vaginal bleeding followed by decreased bleeding until it stops    Hot flashes (feeling warm, flushed, and sweaty)    Vaginal changes such as increased dryness    Mood changes such as anxiety, depression, or decreased desire to have sex    Trouble sleeping, joint pain, headaches    Brittle nails, hair on chin or chest where it is normally absent    Decrease in breast size and change in skin texture    Weight gain    How is menopause treated or managed? Hormone replacement therapy (HRT) is medicine that replaces your low hormone levels  HRT contains estrogen and sometimes progestin  HRT has several benefits  HRT helps prevent osteoporosis, which decreases your risk for bone fractures  HRT also protects you from colorectal cancer  HRT also has some risks  HRT increases your risk for breast cancer, blood clots, heart disease, a heart attack, or a stroke  If you are 72 years or older, HRT can also increase your risk for dementia  Your risk for uterine or endometrial cancer is higher if you take estrogen-only HRT  Manage hot flashes  Hot flashes are brief periods of feeling very warm, flushed, and sweaty  Hot flashes can last from a few seconds to several minutes  They may happen many times during the day, and are common at night  Layer your clothing so that you can easily remove some clothing and cool yourself during a hot flash  Cold drinks may also be helpful  Non-hormone medicines can help relieve or prevent hot flashes  Examples include certain antidepressants, nerve medicines, and high blood pressure medicines  Reduce vaginal dryness by using over-the-counter vaginal creams  Vaginal dryness may cause you to have pain or discomfort during sex  Only use creams that are made for vaginal use  Do  not  use petroleum jelly  You may put an estrogen cream in and around your vagina  Estrogen cream may help decrease vaginal dryness and lower your risk of vaginal infections  Continue to use birth control during perimenopause if you do not want to get pregnant  You may need to use birth control until it has been 1 year since your periods stopped  Ask your healthcare provider when you can stop using birth control to prevent pregnancy  How can I live a healthy lifestyle during and after menopause? After menopause, your risk for heart disease and bone loss increases  Ask about these and other ways to stay healthy:  Exercise regularly  Exercise helps you maintain a healthy weight  Exercise can also help to control your blood pressure and cholesterol levels  Include weight-bearing exercise for strong bones  Weight bearing exercise is recommended for at least 30 minutes, 3 times a week  Ask your healthcare provider about the best exercise plan for you  Eat a variety of healthy foods  Include fruits, vegetables, whole grains (whole-wheat bread, pasta, and cereals), low-fat dairy, and lean protein foods (beans, poultry, and fish)  Limit foods high in sodium (salt)  Ask your healthcare provider for more information about a meal plan that is right for you  Do not smoke  If you smoke, it is never too late to quit  You are more likely to have a heart attack, lung disease, blood clots, and cancer if you smoke  Ask your healthcare provider for information if you need help quitting  Take supplements as directed  You may need extra calcium and vitamin D to help prevent osteoporosis  Limit alcohol and caffeine  Alcohol and caffeine may worsen your symptoms  When should I call my doctor? You have vaginal bleeding after menopause  You have questions or concerns about your condition or care  CARE AGREEMENT:   You have the right to help plan your care  Learn about your health condition and how it may be treated  Discuss treatment options with your healthcare providers to decide what care you want to receive  You always have the right to refuse treatment  The above information is an  only  It is not intended as medical advice for individual conditions or treatments  Talk to your doctor, nurse or pharmacist before following any medical regimen to see if it is safe and effective for you  © Copyright [employer] [facility] 2022 Information is for End User's use only and may not be sold, redistributed or otherwise used for commercial purposes   All illustrations and images included in CareNotes® are the copyrighted property of [employer]  or [address] [place] [address] [email or web address]

## 2022-12-06 ENCOUNTER — ANNUAL EXAM (OUTPATIENT)
Dept: OBGYN CLINIC | Facility: CLINIC | Age: 47
End: 2022-12-06

## 2022-12-06 VITALS
HEIGHT: 61 IN | WEIGHT: 150 LBS | DIASTOLIC BLOOD PRESSURE: 74 MMHG | SYSTOLIC BLOOD PRESSURE: 110 MMHG | BODY MASS INDEX: 28.32 KG/M2

## 2022-12-06 DIAGNOSIS — Z01.419 ENCOUNTER FOR GYNECOLOGICAL EXAMINATION WITHOUT ABNORMAL FINDING: Primary | ICD-10-CM

## 2022-12-06 DIAGNOSIS — Z12.11 COLON CANCER SCREENING: ICD-10-CM

## 2022-12-06 DIAGNOSIS — Z12.31 ENCOUNTER FOR SCREENING MAMMOGRAM FOR MALIGNANT NEOPLASM OF BREAST: ICD-10-CM

## 2022-12-06 NOTE — PROGRESS NOTES
[id number]  LMP: 11/18/2022  PMB:  SA: YES   HPV: NO   Birth control: Tubal ligation   Last pap: 08/11/2021 ASC-US HPV 16 positive  HPV 18 Negative   Last mammo: 05/11/2021:  Last colonoscopy: Not on file  Last Dexa: Not on file  Family History:

## 2022-12-07 LAB
HPV HR 12 DNA CVX QL NAA+PROBE: NEGATIVE
HPV16 DNA CVX QL NAA+PROBE: NEGATIVE
HPV18 DNA CVX QL NAA+PROBE: NEGATIVE

## 2022-12-13 LAB
LAB AP GYN PRIMARY INTERPRETATION: NORMAL
Lab: NORMAL

## 2022-12-20 ENCOUNTER — OFFICE VISIT (OUTPATIENT)
Dept: URGENT CARE | Facility: CLINIC | Age: 47
End: 2022-12-20

## 2022-12-20 VITALS
OXYGEN SATURATION: 98 % | HEART RATE: 104 BPM | RESPIRATION RATE: 17 BRPM | TEMPERATURE: 97.9 F | DIASTOLIC BLOOD PRESSURE: 83 MMHG | SYSTOLIC BLOOD PRESSURE: 131 MMHG

## 2022-12-20 DIAGNOSIS — J06.9 ACUTE URI: Primary | ICD-10-CM

## 2022-12-20 RX ORDER — PSEUDOEPHEDRINE HCL 120 MG/1
120 TABLET, FILM COATED, EXTENDED RELEASE ORAL 2 TIMES DAILY
Qty: 20 TABLET | Refills: 0 | Status: SHIPPED | OUTPATIENT
Start: 2022-12-20

## 2022-12-20 NOTE — PATIENT INSTRUCTIONS
Cold Symptoms   WHAT YOU NEED TO KNOW:   A cold is an infection caused by a virus  The infection causes your upper respiratory system to become inflamed  Common symptoms of a cold include sneezing, dry throat, a stuffy nose, headache, watery eyes, and a cough  Your cough may be dry, or you may cough up mucus  You may also have muscle aches, joint pain, and tiredness  Rarely, you may have a fever  Most colds go away without treatment  DISCHARGE INSTRUCTIONS:   Return to the emergency department if:   You have increased tiredness and weakness  You are unable to eat  Your heart is beating much faster than usual for you  You see white spots in the back of your throat and your neck is swollen and sore to the touch  You see pinpoint or larger reddish-purple dots on your skin  Contact your healthcare provider if:   You have a fever higher than 102°F (38 9°C)  You have new or worsening shortness of breath  You have thick nasal drainage for more than 2 days  Your symptoms do not improve or get worse within 5 days  You have questions or concerns about your condition or care  Medicines: The following medicines may be suggested by your healthcare provider to decrease your cold symptoms  These medicines are available without a doctor's order  Ask which medicines to take and when to take them  Follow directions  NSAIDs or acetaminophen  help to bring down a fever or decrease pain  Decongestants  help decrease nasal stuffiness  Antihistamines  help decrease sneezing and a runny nose  Cough suppressants  help decrease how much you cough  Expectorants  help loosen mucus so you can cough it up  Take your medicine as directed  Contact your healthcare provider if you think your medicine is not helping or if you have side effects  Tell him of her if you are allergic to any medicine  Keep a list of the medicines, vitamins, and herbs you take   Include the amounts, and when and why you take them  Bring the list or the pill bottles to follow-up visits  Carry your medicine list with you in case of an emergency  Symptom relief: The following may help relieve cold symptoms, such as a dry throat and congestion:  Gargle with mouthwash or warm salt water as directed  Suck on throat lozenges or hard candy  Use a cold or warm vaporizer or humidifier to ease your breathing  Rest for at least 2 days and then as needed to decrease tiredness and weakness  Use petroleum based jelly around your nostrils to decrease irritation from blowing your nose  Drink liquids:  Liquids will help thin and loosen thick mucus so you can cough it up  Liquids will also keep you hydrated  Ask your healthcare provider which liquids are best for you and how much to drink each day  Prevent the spread of germs: You can spread your cold germs to others for at least 3 days after your symptoms start  Wash your hands often  Do not share items, such as eating utensils  Cover your nose and mouth when you cough or sneeze using the crook of your elbow instead of your hands  Throw used tissues in the garbage  Do not smoke:  Smoking may worsen your symptoms and increase the length of time you feel sick  Talk with your healthcare provider if you need help to stop smoking  Follow up with your doctor as directed:  Write down your questions so you remember to ask them during your visits  © Copyright [employer] 2022 Information is for End User's use only and may not be sold, redistributed or otherwise used for commercial purposes  All illustrations and images included in CareNotes® are the copyrighted property of [employer] M [employer] Inc  or [facility] [place][note 2] Laurita Trammell   The above information is an  only  It is not intended as medical advice for individual conditions or treatments  Talk to your doctor, nurse or pharmacist before following any medical regimen to see if it is safe and effective for you

## 2022-12-24 ENCOUNTER — OFFICE VISIT (OUTPATIENT)
Dept: URGENT CARE | Facility: CLINIC | Age: 47
End: 2022-12-24

## 2022-12-24 VITALS
BODY MASS INDEX: 27.59 KG/M2 | HEART RATE: 87 BPM | RESPIRATION RATE: 18 BRPM | OXYGEN SATURATION: 99 % | TEMPERATURE: 98 F | WEIGHT: 146 LBS

## 2022-12-24 DIAGNOSIS — H66.001 NON-RECURRENT ACUTE SUPPURATIVE OTITIS MEDIA OF RIGHT EAR WITHOUT SPONTANEOUS RUPTURE OF TYMPANIC MEMBRANE: Primary | ICD-10-CM

## 2022-12-24 RX ORDER — AZITHROMYCIN 250 MG/1
TABLET, FILM COATED ORAL
Qty: 6 TABLET | Refills: 0 | Status: SHIPPED | OUTPATIENT
Start: 2022-12-24 | End: 2022-12-29 | Stop reason: ALTCHOICE

## 2022-12-24 RX ORDER — IBUPROFEN 600 MG/1
600 TABLET ORAL EVERY 6 HOURS PRN
Qty: 30 TABLET | Refills: 0 | Status: SHIPPED | OUTPATIENT
Start: 2022-12-24

## 2022-12-24 NOTE — PROGRESS NOTES
[facility]        NAME: Papi Hernandez is a 41 y o  female  : 1975    MRN: [id number]  DATE: 2022  TIME: 8:56 AM    Assessment and Plan   Non-recurrent acute suppurative otitis media of right ear without spontaneous rupture of tympanic membrane [H66 001]  1  Non-recurrent acute suppurative otitis media of right ear without spontaneous rupture of tympanic membrane  azithromycin (ZITHROMAX) 250 mg tablet    ibuprofen (MOTRIN) 600 mg tablet            Patient Instructions       Follow up with PCP in 3-5 days  Proceed to  ER if symptoms worsen  Chief Complaint     Chief Complaint   Patient presents with   • Earache     Right ear pain and drainage since last week         History of Present Illness       40 yo female reporting right ear pain  Was seen recently which ear revealed serous fluid  Pain is a 8/10, constant, not alleviated with OTC NSAIDs or Tylenol  Review of Systems   Review of Systems   Constitutional: Negative for activity change, appetite change, chills and fever  HENT: Positive for congestion and sinus pressure  Respiratory: Negative for cough, shortness of breath and wheezing  Cardiovascular: Negative for chest pain and palpitations  Gastrointestinal: Negative for abdominal pain and nausea  Skin: Negative for rash  Neurological: Negative for dizziness, light-headedness and headaches  Current Medications       Current Outpatient Medications:   •  azithromycin (ZITHROMAX) 250 mg tablet, Take 2 tablets today then 1 tablet daily x 4 days, Disp: 6 tablet, Rfl: 0  •  ibuprofen (MOTRIN) 600 mg tablet, Take 1 tablet (600 mg total) by mouth every 6 (six) hours as needed for mild pain, Disp: 30 tablet, Rfl: 0  •  fluconazole (DIFLUCAN) 150 mg tablet, Take 1 tablet  If no improvement in symptoms in 24 hours, take second tablet   (Patient not taking: Reported on 2022), Disp: 2 tablet, Rfl: 0  •  multivitamin (THERAGRAN) TABS, Take 1 tablet by mouth daily (Patient not taking: Reported on 12/20/2022), Disp: , Rfl:   •  norethindrone (Ortho Micronor) 0 35 MG tablet, Take 1 tablet (0 35 mg total) by mouth daily (Patient not taking: Reported on 12/6/2022), Disp: 90 tablet, Rfl: 3  •  pseudoephedrine (SUDAFED) 120 MG 12 hr tablet, Take 1 tablet (120 mg total) by mouth 2 (two) times a day, Disp: 20 tablet, Rfl: 0    Current Allergies     Allergies as of 12/24/2022   • (No Known Allergies)            The following portions of the patient's history were reviewed and updated as appropriate: allergies, current medications, past family history, past medical history, past social history, past surgical history and problem list      Past Medical History:   Diagnosis Date   • Abnormal Pap smear of cervix    • Disease of thyroid gland     as a teenager   • Pneumonia    • Shingles 08/2020   • Varicella        Past Surgical History:   Procedure Laterality Date   • AUGMENTATION BREAST  2014   • AUGMENTATION MAMMAPLASTY Bilateral    • FOREIGN BODY REMOVAL Left 11/30/2021    Procedure: Bhavesh Arreola;  Surgeon: Leonard Lopez MD;  Location: [facility];  Service: Gynecology   • AK LAPAROSCOPY W/RMVL ADNEXAL STRUCTURES N/A 11/30/2021    Procedure: OPERATIVE LAPAROSCOPY BILATERAL SALPINGECTOMY;  Surgeon: Leonard Lopez MD;  Location: [facility];  Service: Gynecology   • AK VULVECTOMY SIMPLE PARTIAL Bilateral 1/9/2020    Procedure: SIMPLE VULVECTOMY;  Surgeon: Rosa Mccullough MD;  Location[facility];  Service: Gynecology Oncology       Family History   Problem Relation Age of Onset   • Hypertension Mother    • Hypertension Father    • No Known Problems Daughter    • No Known Problems Son    • Ovarian cancer Neg Hx    • Breast cancer Neg Hx    • Colon cancer Neg Hx          Medications have been verified          Objective   Pulse 87   Temp 98 °F (36 7 °C)   Resp 18   Wt 66 2 kg (146 lb)   SpO2 99%   BMI 27 59 kg/m²        Physical Exam Physical Exam  Vitals and nursing note reviewed  Constitutional:       General: She is not in acute distress  Appearance: Normal appearance  She is not ill-appearing  HENT:      Right Ear: Ear canal and external ear normal       Left Ear: Tympanic membrane, ear canal and external ear normal       Ears:      Comments: Right ear tympanic membrane is inflamed, erythematous  Mouth/Throat:      Mouth: Mucous membranes are moist       Comments: Slightly hyperemic with clear postnasal drip  Eyes:      General: No scleral icterus  Extraocular Movements: Extraocular movements intact  Conjunctiva/sclera: Conjunctivae normal       Pupils: Pupils are equal, round, and reactive to light  Cardiovascular:      Rate and Rhythm: Normal rate and regular rhythm  Pulses: Normal pulses  Heart sounds: Normal heart sounds  Pulmonary:      Effort: Pulmonary effort is normal  No respiratory distress  Breath sounds: Normal breath sounds  No rhonchi or rales  Musculoskeletal:         General: Normal range of motion  Cervical back: Normal range of motion and neck supple  No tenderness  Lymphadenopathy:      Cervical: No cervical adenopathy  Neurological:      General: No focal deficit present  Mental Status: She is alert and oriented to person, place, and time  Cranial Nerves: No cranial nerve deficit        Coordination: Coordination normal       Gait: Gait normal    Psychiatric:         Mood and Affect: Mood normal          Behavior: Behavior normal

## 2022-12-29 ENCOUNTER — OFFICE VISIT (OUTPATIENT)
Dept: FAMILY MEDICINE CLINIC | Facility: CLINIC | Age: 47
End: 2022-12-29

## 2022-12-29 VITALS
TEMPERATURE: 97 F | HEART RATE: 100 BPM | SYSTOLIC BLOOD PRESSURE: 104 MMHG | HEIGHT: 61 IN | BODY MASS INDEX: 27.6 KG/M2 | OXYGEN SATURATION: 98 % | WEIGHT: 146.2 LBS | DIASTOLIC BLOOD PRESSURE: 76 MMHG

## 2022-12-29 DIAGNOSIS — Z13.29 SCREENING FOR THYROID DISORDER: ICD-10-CM

## 2022-12-29 DIAGNOSIS — H65.91 OTITIS MEDIA WITH EFFUSION, RIGHT: Primary | ICD-10-CM

## 2022-12-29 DIAGNOSIS — Z13.21 ENCOUNTER FOR VITAMIN DEFICIENCY SCREENING: ICD-10-CM

## 2022-12-29 DIAGNOSIS — Z76.89 ESTABLISHING CARE WITH NEW DOCTOR, ENCOUNTER FOR: ICD-10-CM

## 2022-12-29 DIAGNOSIS — Z13.0 SCREENING FOR DEFICIENCY ANEMIA: ICD-10-CM

## 2022-12-29 DIAGNOSIS — Z13.1 ENCOUNTER FOR SCREENING FOR DIABETES MELLITUS: ICD-10-CM

## 2022-12-29 DIAGNOSIS — Z11.59 NEED FOR HEPATITIS C SCREENING TEST: ICD-10-CM

## 2022-12-29 DIAGNOSIS — Z13.6 ENCOUNTER FOR SCREENING FOR CARDIOVASCULAR DISORDERS: ICD-10-CM

## 2022-12-29 DIAGNOSIS — Z11.4 ENCOUNTER FOR SCREENING FOR HIV: ICD-10-CM

## 2022-12-29 RX ORDER — AMOXICILLIN 500 MG/1
1000 CAPSULE ORAL EVERY 8 HOURS SCHEDULED
Qty: 60 CAPSULE | Refills: 0 | Status: SHIPPED | OUTPATIENT
Start: 2022-12-29 | End: 2023-01-08

## 2022-12-29 NOTE — PROGRESS NOTES
Name: Birdie Donovan      : 1975      MRN: [id number]  Encounter Provider: EFRAIN Ibanez  Encounter Date: 2022   Encounter department: [facility] [address] [facility] [address]     1  Otitis media with effusion, right  Comments:  Advised to clear water from ears after showering, with vigorous Q-tip use, tobacco cessation  Medications as prescribed  Orders:  -     amoxicillin (AMOXIL) 500 mg capsule; Take 2 capsules (1,000 mg total) by mouth every 8 (eight) hours for 10 days  -     neomycin-polymyxin-hydrocortisone (CORTISPORIN) otic solution; Administer 3 drops to the right ear every 6 (six) hours    2  Establishing care with new doctor, encounter for  Comments:  Intake completed, screening labs ordered, advised patient to return in 2 to 3 weeks after obtaining labs for complete physical     3  Encounter for screening for cardiovascular disorders  -     Lipid panel; Future    4  Encounter for screening for diabetes mellitus  -     Hemoglobin A1C; Future  -     Comprehensive metabolic panel; Future    5  Screening for thyroid disorder  -     TSH, 3rd generation with Free T4 reflex; Future    6  Screening for deficiency anemia  -     CBC and Platelet; Future    7  Encounter for vitamin deficiency screening  -     Vitamin D 25 hydroxy; Future    8  Encounter for screening for HIV  -     HIV 1/2 AB/AG w Reflex [facility] for 2 yr old and above; Future    9  Need for hepatitis C screening test  -     Hepatitis C antibody; Future      BMI Counseling: Body mass index is 27 62 kg/m²  The BMI is above normal  Nutrition recommendations include decreasing portion sizes, decreasing fast food intake, consuming healthier snacks, limiting drinks that contain sugar, moderation in carbohydrate intake, increasing intake of lean protein, reducing intake of saturated and trans fat and reducing intake of cholesterol   Exercise recommendations include exercising 3-5 times per week and strength training exercises  No pharmacotherapy was ordered  Rationale for BMI follow-up plan is due to patient being overweight or obese  Depression Screening and Follow-up Plan: Patient was screened for depression during today's encounter  They screened negative with a PHQ-2 score of 0  Tobacco Cessation Counseling: Tobacco cessation counseling was provided  The patient is sincerely urged to quit consumption of tobacco  She is not ready to quit tobacco  Medication options and side effects of medication discussed  Patient refused medication  Nicotine patch and nicotine gum was prescribed  Subjective      Patient presents to the office for establishment of care  Has not had primary care in more than 10 years  Patient denies any medical history but notes family history of hypertension  Patient notes she is overall healthy  Mammo up to date  [facility]/GYN care- [facility], was giving referral to get colonoscopy   + tobacco use    Patient c/o right ear pain x's 2 weeks  Went to Urgent Care on 12/20, was not diagnosed with otitis media, given Sudafed  Returned to urgent care on 12/24  At that time, patient was diagnosed with otitis media and was given Z-pack, which she completed  Still notes right ear pain, muffled hearing, and sensation of ear popping  Also notes congestion  Patient denies postnasal drip, sore throat, fever, chills  Earache   There is pain in the right ear  This is a recurrent problem  The current episode started 1 to 4 weeks ago  The problem occurs constantly  The problem has been gradually worsening  There has been no fever  The pain is moderate  Associated symptoms include ear discharge  Pertinent negatives include no abdominal pain, coughing, diarrhea, headaches, hearing loss, neck pain, rash, rhinorrhea, sore throat or vomiting  She has tried antibiotics for the symptoms  The treatment provided mild relief   There is no history of a chronic ear infection, hearing loss or a tympanostomy tube  Review of Systems   Constitutional: Negative for chills and fever  HENT: Positive for congestion, ear discharge and ear pain  Negative for hearing loss, postnasal drip, rhinorrhea, sinus pressure, sinus pain, sore throat and trouble swallowing  Eyes: Negative for pain, discharge and itching  Respiratory: Negative for cough and shortness of breath  Cardiovascular: Negative for chest pain and palpitations  Gastrointestinal: Negative for abdominal pain, diarrhea and vomiting  Genitourinary: Negative for decreased urine volume  Musculoskeletal: Negative for neck pain and neck stiffness  Skin: Negative for rash  Neurological: Negative for weakness, light-headedness and headaches  Hematological: Negative for adenopathy  Psychiatric/Behavioral: Negative for confusion  Current Outpatient Medications on File Prior to Visit   Medication Sig   • ibuprofen (MOTRIN) 600 mg tablet Take 1 tablet (600 mg total) by mouth every 6 (six) hours as needed for mild pain   • multivitamin (THERAGRAN) TABS Take 1 tablet by mouth daily   • pseudoephedrine (SUDAFED) 120 MG 12 hr tablet Take 1 tablet (120 mg total) by mouth 2 (two) times a day   • norethindrone (Ortho Micronor) 0 35 MG tablet Take 1 tablet (0 35 mg total) by mouth daily (Patient not taking: Reported on 12/6/2022)   • [DISCONTINUED] azithromycin (ZITHROMAX) 250 mg tablet Take 2 tablets today then 1 tablet daily x 4 days (Patient not taking: Reported on 12/29/2022)   • [DISCONTINUED] fluconazole (DIFLUCAN) 150 mg tablet Take 1 tablet  If no improvement in symptoms in 24 hours, take second tablet  (Patient not taking: Reported on 12/6/2022)       Objective     /76 (BP Location: Left arm, Patient Position: Standing)   Pulse 100   Temp (!) 97 °F (36 1 °C) (Tympanic)   Ht 5' 1" (1 549 m)   Wt 66 3 kg (146 lb 3 2 oz)   SpO2 98%   BMI 27 62 kg/m²     Physical Exam  Vitals reviewed     Constitutional: General: She is not in acute distress  Appearance: Normal appearance  She is not ill-appearing  HENT:      Head: Normocephalic and atraumatic  Right Ear: Hearing normal  Tenderness present  A middle ear effusion is present  Tympanic membrane is injected and erythematous  Left Ear: Tympanic membrane, ear canal and external ear normal       Nose: Nose normal       Mouth/Throat:      Mouth: Mucous membranes are moist       Pharynx: Oropharynx is clear  No oropharyngeal exudate or posterior oropharyngeal erythema  Eyes:      Conjunctiva/sclera: Conjunctivae normal       Pupils: Pupils are equal, round, and reactive to light  Cardiovascular:      Rate and Rhythm: Normal rate and regular rhythm  Pulses: Normal pulses  Heart sounds: Normal heart sounds  No murmur heard  Pulmonary:      Effort: Pulmonary effort is normal       Breath sounds: Normal breath sounds  Abdominal:      General: Bowel sounds are normal       Palpations: Abdomen is soft  Tenderness: There is no abdominal tenderness  Musculoskeletal:         General: Normal range of motion  Cervical back: Normal range of motion and neck supple  Lymphadenopathy:      Cervical: No cervical adenopathy  Skin:     General: Skin is warm and dry  Capillary Refill: Capillary refill takes less than 2 seconds  Neurological:      General: No focal deficit present  Mental Status: She is alert and oriented to person, place, and time     Psychiatric:         Mood and Affect: Mood normal          Behavior: Behavior normal        EFRAIN Najera

## 2022-12-29 NOTE — LETTER
December 29, 2022     Patient: Birdie Donovan  YOB: 1975  Date of Visit: 12/29/2022      To Whom it May Concern:    Birdie Donovan is under my professional care  Gisell Frazier was seen in my office on 12/29/2022  Gisell Frazier may return to work on Saturday 12/31/2022  If you have any questions or concerns, please don't hesitate to call           Sincerely,          EFRAIN Ibanez        CC: No Recipients

## 2022-12-29 NOTE — PATIENT INSTRUCTIONS
Ear Infection   AMBULATORY CARE:   An ear infection  is also called otitis media  Blocked or swollen eustachian tubes can cause an infection  Eustachian tubes connect the middle ear to the back of the nose and throat  They drain fluid from the middle ear  You may have a buildup of fluid in your ear  Germs build up in the fluid and infection develops  Common signs and symptoms:   Ear pain    Fever or a headache    Trouble hearing    Ringing or buzzing in your ear    Plugged ear or an ear that feels full    Dizziness    Nausea or vomiting    Seek care immediately if:   You have clear fluid coming from your ear  You have a stiff neck, headache, and a fever  Call your doctor if:   You see blood or pus draining from your ear  Your ear pain gets worse or does not go away, even after treatment  The outside of your ear is red or swollen  You are vomiting or have diarrhea  You have questions or concerns about your condition or care  Medicines: You may  need any of the following:  Acetaminophen  decreases pain and fever  It is available without a doctor's order  Ask how much to take and how often to take it  Follow directions  Read the labels of all other medicines you are using to see if they also contain acetaminophen, or ask your doctor or pharmacist  Acetaminophen can cause liver damage if not taken correctly  Do not use more than 4 grams (4,000 milligrams) total of acetaminophen in one day  NSAIDs , such as ibuprofen, help decrease swelling, pain, and fever  This medicine is available with or without a doctor's order  NSAIDs can cause stomach bleeding or kidney problems in certain people  If you take blood thinner medicine, always ask your healthcare provider if NSAIDs are safe for you  Always read the medicine label and follow directions  Ear drops  may contain medicine to decrease pain and inflammation  Antibiotics  help treat a bacterial infection      Self-care:   Apply heat  on your ear for 15 to 20 minutes, 3 to 4 times a day or as directed  You can apply heat with an electric heating pad, hot water bottle, or warm compress  Always put a cloth between your skin and the heat pack to prevent burns  Heat helps decrease pain  Apply ice  on your ear for 15 to 20 minutes, 3 to 4 times a day for 2 days or as directed  Use an ice pack, or put crushed ice in a plastic bag  Cover it with a towel before you apply it to your ear  Ice decreases swelling and pain  Prevent an ear infection:   Wash your hands often  to help prevent the spread of germs  Ask everyone in your house to wash their hands with soap and water  Ask them to wash after they use the bathroom or change a diaper  Remind them to wash before they prepare or eat food  Stay away from people who are ill  Some germs spread easily and quickly through contact  Follow up with your doctor as directed:  Write down your questions so you remember to ask them during your visits  © Copyright [employer] 2022 Information is for End User's use only and may not be sold, redistributed or otherwise used for commercial purposes  All illustrations and images included in CareNotes® are the copyrighted property of [employer]  or [facility] Laurita Trammell   The above information is an  only  It is not intended as medical advice for individual conditions or treatments  Talk to your doctor, nurse or pharmacist before following any medical regimen to see if it is safe and effective for you

## 2023-01-10 ENCOUNTER — APPOINTMENT (OUTPATIENT)
Dept: LAB | Facility: HOSPITAL | Age: 48
End: 2023-01-10

## 2023-01-10 DIAGNOSIS — Z13.6 ENCOUNTER FOR SCREENING FOR CARDIOVASCULAR DISORDERS: ICD-10-CM

## 2023-01-10 DIAGNOSIS — Z13.29 SCREENING FOR THYROID DISORDER: ICD-10-CM

## 2023-01-10 DIAGNOSIS — Z11.59 NEED FOR HEPATITIS C SCREENING TEST: ICD-10-CM

## 2023-01-10 DIAGNOSIS — Z11.4 ENCOUNTER FOR SCREENING FOR HIV: ICD-10-CM

## 2023-01-10 DIAGNOSIS — Z13.0 SCREENING FOR DEFICIENCY ANEMIA: ICD-10-CM

## 2023-01-10 DIAGNOSIS — Z13.1 ENCOUNTER FOR SCREENING FOR DIABETES MELLITUS: ICD-10-CM

## 2023-01-10 DIAGNOSIS — Z13.21 ENCOUNTER FOR VITAMIN DEFICIENCY SCREENING: ICD-10-CM

## 2023-01-10 LAB
25(OH)D3 SERPL-MCNC: 25 NG/ML (ref 30–100)
ALBUMIN SERPL BCP-MCNC: 3.8 G/DL (ref 3.5–5)
ALP SERPL-CCNC: 44 U/L (ref 46–116)
ALT SERPL W P-5'-P-CCNC: 19 U/L (ref 12–78)
ANION GAP SERPL CALCULATED.3IONS-SCNC: 8 MMOL/L (ref 4–13)
AST SERPL W P-5'-P-CCNC: 17 U/L (ref 5–45)
BILIRUB SERPL-MCNC: 0.29 MG/DL (ref 0.2–1)
BUN SERPL-MCNC: 17 MG/DL (ref 5–25)
CALCIUM SERPL-MCNC: 8.5 MG/DL (ref 8.3–10.1)
CHLORIDE SERPL-SCNC: 106 MMOL/L (ref 96–108)
CHOLEST SERPL-MCNC: 207 MG/DL
CO2 SERPL-SCNC: 27 MMOL/L (ref 21–32)
CREAT SERPL-MCNC: 0.9 MG/DL (ref 0.6–1.3)
ERYTHROCYTE [DISTWIDTH] IN BLOOD BY AUTOMATED COUNT: 12.8 % (ref 11.6–15.1)
EST. AVERAGE GLUCOSE BLD GHB EST-MCNC: 114 MG/DL
GFR SERPL CREATININE-BSD FRML MDRD: 77 ML/MIN/1.73SQ M
GLUCOSE P FAST SERPL-MCNC: 94 MG/DL (ref 65–99)
HBA1C MFR BLD: 5.6 %
HCT VFR BLD AUTO: 40.9 % (ref 34.8–46.1)
HDLC SERPL-MCNC: 66 MG/DL
HGB BLD-MCNC: 13.2 G/DL (ref 11.5–15.4)
LDLC SERPL CALC-MCNC: 136 MG/DL (ref 0–100)
MCH RBC QN AUTO: 29.2 PG (ref 26.8–34.3)
MCHC RBC AUTO-ENTMCNC: 32.3 G/DL (ref 31.4–37.4)
MCV RBC AUTO: 91 FL (ref 82–98)
NONHDLC SERPL-MCNC: 141 MG/DL
PLATELET # BLD AUTO: 235 THOUSANDS/UL (ref 149–390)
PMV BLD AUTO: 10.9 FL (ref 8.9–12.7)
POTASSIUM SERPL-SCNC: 4.4 MMOL/L (ref 3.5–5.3)
PROT SERPL-MCNC: 7.2 G/DL (ref 6.4–8.4)
RBC # BLD AUTO: 4.52 MILLION/UL (ref 3.81–5.12)
SODIUM SERPL-SCNC: 141 MMOL/L (ref 135–147)
TRIGL SERPL-MCNC: 27 MG/DL
TSH SERPL DL<=0.05 MIU/L-ACNC: 0.84 UIU/ML (ref 0.45–4.5)
WBC # BLD AUTO: 4.62 THOUSAND/UL (ref 4.31–10.16)

## 2023-01-11 ENCOUNTER — OFFICE VISIT (OUTPATIENT)
Dept: FAMILY MEDICINE CLINIC | Facility: CLINIC | Age: 48
End: 2023-01-11

## 2023-01-11 VITALS
DIASTOLIC BLOOD PRESSURE: 70 MMHG | SYSTOLIC BLOOD PRESSURE: 102 MMHG | OXYGEN SATURATION: 98 % | TEMPERATURE: 97.5 F | HEART RATE: 89 BPM | HEIGHT: 66 IN | BODY MASS INDEX: 23.63 KG/M2 | WEIGHT: 147 LBS

## 2023-01-11 DIAGNOSIS — Z12.11 SCREENING FOR COLORECTAL CANCER: ICD-10-CM

## 2023-01-11 DIAGNOSIS — Z12.12 SCREENING FOR COLORECTAL CANCER: ICD-10-CM

## 2023-01-11 DIAGNOSIS — Z00.00 ANNUAL PHYSICAL EXAM: ICD-10-CM

## 2023-01-11 DIAGNOSIS — E55.9 VITAMIN D INSUFFICIENCY: ICD-10-CM

## 2023-01-11 DIAGNOSIS — H65.191 ACUTE EFFUSION OF RIGHT EAR: Primary | ICD-10-CM

## 2023-01-11 DIAGNOSIS — H65.196 OTHER RECURRENT ACUTE NONSUPPURATIVE OTITIS MEDIA OF BOTH EARS: ICD-10-CM

## 2023-01-11 LAB
HCV AB SER QL: NORMAL
HIV 1+2 AB+HIV1 P24 AG SERPL QL IA: NORMAL
HIV 2 AB SERPL QL IA: NORMAL
HIV1 AB SERPL QL IA: NORMAL
HIV1 P24 AG SERPL QL IA: NORMAL

## 2023-01-11 RX ORDER — NEOMYCIN SULFATE, POLYMYXIN B SULFATE AND HYDROCORTISONE 10; 3.5; 1 MG/ML; MG/ML; [USP'U]/ML
SUSPENSION/ DROPS AURICULAR (OTIC)
COMMUNITY
Start: 2022-12-29 | End: 2023-01-11 | Stop reason: ALTCHOICE

## 2023-01-11 RX ORDER — FLUTICASONE PROPIONATE 50 MCG
1 SPRAY, SUSPENSION (ML) NASAL DAILY
Qty: 18.2 ML | Refills: 1 | Status: SHIPPED | OUTPATIENT
Start: 2023-01-11

## 2023-01-11 NOTE — PROGRESS NOTES
[facility] [address] [place] [address]    NAME: Sabrina Lu  AGE: 41 y o  SEX: female  : 1975     DATE: 2023     Assessment and Plan:     Problem List Items Addressed This Visit        Other    Vitamin D insufficiency     Discussed healthy intake of sun exposure dietary intake of vitamin D  Advised patient to take over-the-counter vitamin D supplements 1000 to 2000 units daily  Other Visit Diagnoses     Acute effusion of right ear    -  Primary    Advised increased hydration, Mucinex twice daily, saline rinses twice daily, avoid tobacco use, Flonase as prescribed  Relevant Medications    fluticasone (FLONASE) 50 mcg/act nasal spray    Other recurrent acute nonsuppurative otitis media of both ears        Relevant Orders    Ambulatory Referral to Otolaryngology    Screening for colorectal cancer        Relevant Orders    Ambulatory referral for Colonoscopy    Annual physical exam              Immunizations and preventive care screenings were discussed with patient today  Appropriate education was printed on patient's after visit summary  Counseling:  Alcohol/drug use: discussed moderation in alcohol intake, the recommendations for healthy alcohol use, and avoidance of illicit drug use  Dental Health: discussed importance of regular tooth brushing, flossing, and dental visits  Injury prevention: discussed safety/seat belts, safety helmets, smoke detectors, carbon dioxide detectors, and smoking near bedding or upholstery  Sexual health: discussed sexually transmitted diseases, partner selection, use of condoms, avoidance of unintended pregnancy, and contraceptive alternatives  · Exercise: the importance of regular exercise/physical activity was discussed  Recommend exercise 3-5 times per week for at least 30 minutes            Return in about 1 year (around 2024) for Annual physical  Chief Complaint:     Chief Complaint   Patient presents with   • Earache      History of Present Illness:     Adult Annual Physical   Patient here for a comprehensive physical exam  The patient reports problems - right ear discomfort  Recently treated for otitis media with amoxicillin and eardrops  States she finished course of treatment  Notes overall symptoms have improved, but notes continued decreased hearing in right ear and occasional right ear pain  Denies drainage, fever, chills  Diet and Physical Activity  · Diet/Nutrition: well balanced diet  · Exercise: walking  Depression Screening  PHQ-2/9 Depression Screening         General Health  · Sleep: sleeps well and gets more than 8 hours of sleep on average  · Hearing: normal - bilateral   · Vision: most recent eye exam >1 year ago and wears glasses for reading  · Dental: regular dental visits, brushes teeth twice daily and flosses teeth occasionally  /GYN Health  · Patient is: premenopausal  · Last menstrual period: 12/21/2022  · Contraceptive method: n/a  Review of Systems:     Review of Systems   Constitutional: Negative for chills, diaphoresis, fatigue and fever  HENT: Positive for congestion (occasional), ear pain and hearing loss (notes decreased hearing in right ear)  Negative for postnasal drip, sinus pressure, sinus pain, sore throat and trouble swallowing  Eyes: Negative for photophobia, pain and visual disturbance  Respiratory: Negative for cough, chest tightness and shortness of breath  Cardiovascular: Negative for chest pain and palpitations  Gastrointestinal: Negative for abdominal pain, diarrhea, nausea and vomiting  Endocrine: Negative for polydipsia, polyphagia and polyuria  Genitourinary: Negative for decreased urine volume, dysuria, hematuria and urgency  Musculoskeletal: Negative for arthralgias, back pain and myalgias  Skin: Negative for color change and rash     Neurological: Negative for dizziness, syncope, weakness, light-headedness, numbness and headaches  Hematological: Negative for adenopathy  Psychiatric/Behavioral: Negative for confusion, dysphoric mood and sleep disturbance  The patient is not nervous/anxious  All other systems reviewed and are negative       Past Medical History:     Past Medical History:   Diagnosis Date   • Abnormal Pap smear of cervix    • Disease of thyroid gland     as a teenager   • Pneumonia    • Shingles 08/2020   • Varicella       Past Surgical History:     Past Surgical History:   Procedure Laterality Date   • AUGMENTATION BREAST  2014   • AUGMENTATION MAMMAPLASTY Bilateral    • FOREIGN BODY REMOVAL Left 11/30/2021    Procedure: Erin Paez;  Surgeon: Rocio Lui MD;  [facility];  Service: Gynecology   • MI LAPAROSCOPY W/RMVL ADNEXAL STRUCTURES N/A 11/30/2021    Procedure: OPERATIVE LAPAROSCOPY BILATERAL SALPINGECTOMY;  Surgeon: Rocio Lui MD;  [facility];  Service: Gynecology   • MI VULVECTOMY SIMPLE PARTIAL Bilateral 1/9/2020    Procedure: Adam Marr;  Surgeon: Moraima Williamson MD;  [facility];  Service: Gynecology Oncology      Social History:     Social History     Socioeconomic History   • Marital status: /Civil Union     Spouse name: None   • Number of children: None   • Years of education: None   • Highest education level: None   Occupational History   • None   Tobacco Use   • Smoking status: Every Day     Packs/day: 1 00     Years: 25 00     Pack years: 25 00     Types: Cigarettes   • Smokeless tobacco: Never   • Tobacco comments:     15 cigs per day   Vaping Use   • Vaping Use: Never used   Substance and Sexual Activity   • Alcohol use: Not Currently     Comment: occasionally   • Drug use: Never   • Sexual activity: Yes     Partners: Male     Birth control/protection: None, Female Sterilization     Comment: tubal    Other Topics Concern   • None   Social History Narrative Works full-time        2 children     Social Determinants of [address] Strain: Not on file   Food Insecurity: Not on file   Transportation Needs: Not on file   Physical Activity: Not on file   Stress: Not on file   Social Connections: Not on file   Intimate Partner Violence: Not on file   Housing Stability: Not on file      Family History:     Family History   Problem Relation Age of Onset   • Hypertension Mother    • Hypertension Father    • No Known Problems Daughter    • No Known Problems Son    • Ovarian cancer Neg Hx    • Breast cancer Neg Hx    • Colon cancer Neg Hx       Current Medications:     Current Outpatient Medications   Medication Sig Dispense Refill   • fluticasone (FLONASE) 50 mcg/act nasal spray 1 spray into each nostril daily 18 2 mL 1   • ibuprofen (MOTRIN) 600 mg tablet Take 1 tablet (600 mg total) by mouth every 6 (six) hours as needed for mild pain 30 tablet 0   • multivitamin (THERAGRAN) TABS Take 1 tablet by mouth daily       No current facility-administered medications for this visit  Allergies:     No Known Allergies   Physical Exam:     /70 (BP Location: Left arm, Patient Position: Sitting)   Pulse 89   Temp 97 5 °F (36 4 °C) (Tympanic)   Ht 5' 6" (1 676 m)   Wt 66 7 kg (147 lb)   SpO2 98%   BMI 23 73 kg/m²     Physical Exam  Vitals reviewed  Constitutional:       General: She is not in acute distress  Appearance: Normal appearance  She is not ill-appearing  HENT:      Head: Normocephalic and atraumatic  Right Ear: Ear canal and external ear normal  Decreased hearing noted  No tenderness  A middle ear effusion is present  There is no impacted cerumen  Tympanic membrane is not injected or erythematous  Left Ear: Tympanic membrane, ear canal and external ear normal   No middle ear effusion  There is no impacted cerumen  Tympanic membrane is not injected or erythematous  Nose: Nose normal  No congestion or rhinorrhea  Mouth/Throat:      Mouth: Mucous membranes are moist       Pharynx: Oropharynx is clear  No oropharyngeal exudate or posterior oropharyngeal erythema  Eyes:      Extraocular Movements: Extraocular movements intact  Conjunctiva/sclera: Conjunctivae normal       Pupils: Pupils are equal, round, and reactive to light  Cardiovascular:      Rate and Rhythm: Normal rate and regular rhythm  Pulses: Normal pulses  Heart sounds: Normal heart sounds  No murmur heard  Pulmonary:      Effort: Pulmonary effort is normal       Breath sounds: Normal breath sounds  Abdominal:      General: Bowel sounds are normal       Palpations: Abdomen is soft  Tenderness: There is no abdominal tenderness  There is no right CVA tenderness or left CVA tenderness  Musculoskeletal:         General: Normal range of motion  Cervical back: Normal range of motion and neck supple  Right lower leg: No edema  Left lower leg: No edema  Lymphadenopathy:      Cervical: No cervical adenopathy  Skin:     General: Skin is warm and dry  Capillary Refill: Capillary refill takes less than 2 seconds  Neurological:      General: No focal deficit present  Mental Status: She is alert and oriented to person, place, and time     Psychiatric:         Mood and Affect: Mood normal          Behavior: Behavior normal           Maisha Devries, 1957 [facility] [address] [facility]

## 2023-01-11 NOTE — PATIENT INSTRUCTIONS

## 2023-01-11 NOTE — ASSESSMENT & PLAN NOTE
Discussed healthy intake of sun exposure dietary intake of vitamin D  Advised patient to take over-the-counter vitamin D supplements 1000 to 2000 units daily

## 2023-02-15 ENCOUNTER — TELEPHONE (OUTPATIENT)
Dept: OBGYN CLINIC | Facility: CLINIC | Age: 48
End: 2023-02-15

## 2023-02-15 ENCOUNTER — OFFICE VISIT (OUTPATIENT)
Dept: FAMILY MEDICINE CLINIC | Facility: CLINIC | Age: 48
End: 2023-02-15

## 2023-02-15 VITALS
TEMPERATURE: 99.8 F | OXYGEN SATURATION: 98 % | HEART RATE: 105 BPM | DIASTOLIC BLOOD PRESSURE: 68 MMHG | WEIGHT: 145.4 LBS | BODY MASS INDEX: 23.37 KG/M2 | SYSTOLIC BLOOD PRESSURE: 108 MMHG | HEIGHT: 66 IN

## 2023-02-15 DIAGNOSIS — J02.9 SORE THROAT: ICD-10-CM

## 2023-02-15 DIAGNOSIS — B34.9 VIRAL ILLNESS: ICD-10-CM

## 2023-02-15 DIAGNOSIS — J02.0 STREP PHARYNGITIS: Primary | ICD-10-CM

## 2023-02-15 LAB — S PYO AG THROAT QL: POSITIVE

## 2023-02-15 RX ORDER — AMOXICILLIN 500 MG/1
500 CAPSULE ORAL EVERY 12 HOURS SCHEDULED
Qty: 20 CAPSULE | Refills: 0 | Status: SHIPPED | OUTPATIENT
Start: 2023-02-15 | End: 2023-02-25

## 2023-02-15 NOTE — PROGRESS NOTES
Name: Norma Guerrero      : 1975      MRN: [id number]  Encounter Provider: EFRAIN Hensley  Encounter Date: 2/15/2023   Encounter department: [facility] [address] [address]     1  Strep pharyngitis  Comments:  Vies to increase hydration, soft foods, voice rest, to discard toothbrush  Amoxicillin as prescribed  To return for worsening/concerning symptoms  Orders:  -     amoxicillin (AMOXIL) 500 mg capsule; Take 1 capsule (500 mg total) by mouth every 12 (twelve) hours for 10 days    2  Sore throat  -     POCT rapid strepA    3  Viral illness  Comments:  to r/o Covid/flu, to quarantine while a/w results  Advised increased rest, hydration, adequate nutrition, can take OTC vit , c, d, zinc   Orders:  -     Covid/Flu- Office Collect           Subjective      Patient presents to the office for evaluation sore throat, congestion, body aches  Patient notes generalized headache yesterday, but states symptoms worsened today  + chills  Denies decreased p o  intake, decreased urine output  Denies inability to swallow or sensation of throat closing up  Denies congestion, postnasal drip, ear pain, fever  Sore Throat   This is a new problem  The current episode started yesterday  The problem has been gradually worsening  The pain is worse on the right side  There has been no fever  The pain is moderate  Associated symptoms include headaches  Pertinent negatives include no abdominal pain, congestion, coughing, diarrhea, drooling, ear discharge, ear pain, hoarse voice, plugged ear sensation, neck pain, shortness of breath, stridor, swollen glands, trouble swallowing or vomiting  She has had no exposure to strep or mono  She has tried nothing for the symptoms  Review of Systems   Constitutional: Positive for chills  Negative for fever  HENT: Positive for sore throat   Negative for congestion, drooling, ear discharge, ear pain, hoarse voice, postnasal drip, sinus pressure, sinus pain, trouble swallowing and voice change  Eyes: Negative for pain and redness  Respiratory: Negative for cough, shortness of breath and stridor  Cardiovascular: Negative for chest pain and palpitations  Gastrointestinal: Negative for abdominal pain, diarrhea and vomiting  Genitourinary: Negative for decreased urine volume  Musculoskeletal: Negative for neck pain and neck stiffness  Skin: Negative for color change and rash  Neurological: Positive for headaches  Negative for dizziness, weakness and light-headedness  Psychiatric/Behavioral: Negative for confusion  Current Outpatient Medications on File Prior to Visit   Medication Sig   • multivitamin (THERAGRAN) TABS Take 1 tablet by mouth daily   • [DISCONTINUED] fluticasone (FLONASE) 50 mcg/act nasal spray 1 spray into each nostril daily (Patient not taking: Reported on 2/15/2023)   • [DISCONTINUED] ibuprofen (MOTRIN) 600 mg tablet Take 1 tablet (600 mg total) by mouth every 6 (six) hours as needed for mild pain (Patient not taking: Reported on 2/15/2023)       Objective     /68 (BP Location: Left arm, Patient Position: Sitting, Cuff Size: Standard)   Pulse 105   Temp 99 8 °F (37 7 °C) (Tympanic)   Ht 5' 6" (1 676 m)   Wt 66 kg (145 lb 6 4 oz)   SpO2 98%   BMI 23 47 kg/m²     Physical Exam  Vitals reviewed  Constitutional:       General: She is not in acute distress  Appearance: She is well-developed  She is not ill-appearing  HENT:      Head: Normocephalic and atraumatic  Right Ear: Tympanic membrane and ear canal normal  No tenderness  No middle ear effusion  Tympanic membrane is not erythematous  Left Ear: Tympanic membrane and ear canal normal  No tenderness  No middle ear effusion  Tympanic membrane is not erythematous  Nose: No congestion or rhinorrhea  Mouth/Throat:      Mouth: Mucous membranes are moist       Pharynx: Uvula midline   Posterior oropharyngeal erythema present  No oropharyngeal exudate  Tonsils: No tonsillar exudate or tonsillar abscesses  1+ on the right  1+ on the left  Eyes:      Extraocular Movements:      Right eye: Normal extraocular motion  Left eye: Normal extraocular motion  Conjunctiva/sclera: Conjunctivae normal       Pupils: Pupils are equal, round, and reactive to light  Neck:      Thyroid: No thyromegaly  Cardiovascular:      Rate and Rhythm: Regular rhythm  Tachycardia present  Heart sounds: Normal heart sounds  No murmur heard  Pulmonary:      Effort: Pulmonary effort is normal       Breath sounds: Normal breath sounds  No stridor  No wheezing  Abdominal:      General: Bowel sounds are normal       Palpations: Abdomen is soft  Tenderness: There is no abdominal tenderness  Musculoskeletal:      Cervical back: Normal range of motion and neck supple  Lymphadenopathy:      Cervical: Cervical adenopathy present  Left cervical: Superficial cervical adenopathy present  Skin:     General: Skin is warm and dry  Capillary Refill: Capillary refill takes less than 2 seconds  Findings: No rash  Neurological:      General: No focal deficit present  Mental Status: She is alert and oriented to person, place, and time     Psychiatric:         Mood and Affect: Mood normal          Behavior: Behavior normal        EFRAIN Lucio

## 2023-02-15 NOTE — PATIENT INSTRUCTIONS
Strep Throat   AMBULATORY CARE:   Strep throat  is a throat infection caused by bacteria  It is easily spread from person to person  Common symptoms include the following:   Sore, red, and swollen throat    Fever and headache     Upset stomach, abdominal pain, or vomiting    White or yellow patches or blisters in the back of your throat    Tender, swollen lumps on the sides of your neck or jaw    Throat pain when you swallow    Call 911 for any of the following: You have trouble breathing  Seek care immediately if:   You have new symptoms like a bad headache, stiff neck, chest pain, or vomiting  You are drooling because you cannot swallow your spit  Contact your healthcare provider if:   You have a fever  You have a rash or ear pain  You have green, yellow-brown, or bloody mucus when you cough or blow your nose  You are unable to drink anything  You have questions or concerns about your condition or care  Treatment for strep throat  may include antibiotic medicine to treat your strep throat  You should feel better within 2 to 3 days after you start antibiotics  You may return to work or school 24 hours after you start antibiotics  Manage strep throat:   Use lozenges, ice, soft foods, or popsicles  to soothe your throat  Drink juice, milk shakes, or soup  if your throat is too sore to eat solid food  Drinking liquids can also help prevent dehydration  Gargle with salt water  Mix ¼ teaspoon salt in a glass of warm water and gargle  This may help reduce swelling in your throat  Do not smoke  Nicotine and other chemicals in cigarettes and cigars can cause lung damage and make your symptoms worse  Ask your healthcare provider for information if you currently smoke and need help to quit  E-cigarettes or smokeless tobacco still contain nicotine  Talk to your healthcare provider before you use these products  Prevent the spread of strep throat:   Wash your hands often    Use soap and water  Wash your hands after you use the bathroom, change a child's diapers, or sneeze  Wash your hands before you prepare or eat food  Do not share food or drinks  Replace your toothbrush after you have taken antibiotics for 24 hours  Follow up with your doctor as directed:  Write down your questions so you remember to ask them during your visits  © Copyright [employer] 2022 Information is for End User's use only and may not be sold, redistributed or otherwise used for commercial purposes  All illustrations and images included in CareNotes® are the copyrighted property of A D A M , Inc  or [address] [place], [place] [address]al   The above information is an  only  It is not intended as medical advice for individual conditions or treatments  Talk to your doctor, nurse or pharmacist before following any medical regimen to see if it is safe and effective for you

## 2023-02-15 NOTE — LETTER
February 15, 2023     Patient: Magda Garcia  YOB: 1975  Date of Visit: 2/15/2023      To Whom it May Concern:    Magda Garcia is under my professional care  Solomon Ramos was seen in my office on 2/15/2023  Solomon Ramos may return to work on Sunday 2/19/2023  If you have any questions or concerns, please don't hesitate to call           Sincerely,          EFRAIN Muro        CC: No Recipients

## 2023-02-16 LAB
FLUAV RNA RESP QL NAA+PROBE: NEGATIVE
FLUBV RNA RESP QL NAA+PROBE: NEGATIVE
SARS-COV-2 RNA RESP QL NAA+PROBE: NEGATIVE

## 2023-03-01 ENCOUNTER — TELEPHONE (OUTPATIENT)
Dept: GASTROENTEROLOGY | Facility: CLINIC | Age: 48
End: 2023-03-01

## 2023-03-01 ENCOUNTER — PREP FOR PROCEDURE (OUTPATIENT)
Dept: GASTROENTEROLOGY | Facility: CLINIC | Age: 48
End: 2023-03-01

## 2023-03-01 DIAGNOSIS — Z12.11 SCREENING FOR COLON CANCER: Primary | ICD-10-CM

## 2023-03-01 NOTE — TELEPHONE ENCOUNTER
Spoke with the patient and she scheduled her colonoscopy  Prep instructions mailed to her home  Scheduled date of colonoscopy (as of today):6/5/23  Physician performing colonoscopy: Toby  Location of colonoscopy:[facility]  Bowel prep reviewed with patient:miralax/dulcolax  Instructions reviewed with patient by:Whit KING  Clearances: none

## 2023-05-15 ENCOUNTER — OFFICE VISIT (OUTPATIENT)
Dept: URGENT CARE | Facility: CLINIC | Age: 48
End: 2023-05-15

## 2023-05-15 VITALS
DIASTOLIC BLOOD PRESSURE: 87 MMHG | OXYGEN SATURATION: 100 % | SYSTOLIC BLOOD PRESSURE: 132 MMHG | TEMPERATURE: 98 F | HEART RATE: 76 BPM | RESPIRATION RATE: 18 BRPM | WEIGHT: 147 LBS | BODY MASS INDEX: 23.73 KG/M2

## 2023-05-15 DIAGNOSIS — J06.9 ACUTE URI: Primary | ICD-10-CM

## 2023-05-15 LAB — S PYO AG THROAT QL: NEGATIVE

## 2023-05-15 RX ORDER — DEXTROMETHORPHAN HYDROBROMIDE AND PROMETHAZINE HYDROCHLORIDE 15; 6.25 MG/5ML; MG/5ML
2.5 SOLUTION ORAL 4 TIMES DAILY PRN
Qty: 180 ML | Refills: 0 | Status: SHIPPED | OUTPATIENT
Start: 2023-05-15

## 2023-05-15 RX ORDER — AZITHROMYCIN 250 MG/1
TABLET, FILM COATED ORAL
Qty: 6 TABLET | Refills: 0 | Status: SHIPPED | OUTPATIENT
Start: 2023-05-15 | End: 2023-05-19

## 2023-05-15 NOTE — PROGRESS NOTES
[facility]        NAME: Nate Sharpe is a 41 y o  female  : 1975    MRN: [id number]  DATE: May 15, 2023  TIME: 1:16 PM    Assessment and Plan   Acute URI [J06 9]  1  Acute URI  azithromycin (ZITHROMAX) 250 mg tablet    Promethazine-DM (PHENERGAN-DM) 6 25-15 mg/5 mL oral syrup            Patient Instructions       Follow up with PCP in 3-5 days  Proceed to  ER if symptoms worsen  Chief Complaint     Chief Complaint   Patient presents with   • Cold Like Symptoms     Pt c/o runny/stuffy nose, sore throat, dry cough for 1 week and last 3 nights has vomited with coughing at night, unable to sleep  History of Present Illness       Patient is a 40 yo female who presents for evaluation of nasal congestion, PND, cough, sore throat x 1 week  No relief with OTC meds  She states that last night the coughing was so forceful that she vomited three times  She denies fevers, SOB, chest pain, hemoptysis  Review of Systems   Review of Systems   Constitutional: Negative for fever  HENT: Positive for congestion, postnasal drip and sore throat  Respiratory: Positive for cough  Negative for shortness of breath and wheezing  Cardiovascular: Negative for chest pain  Gastrointestinal: Positive for vomiting           Current Medications       Current Outpatient Medications:   •  azithromycin (ZITHROMAX) 250 mg tablet, Take 2 tablets today then 1 tablet daily x 4 days, Disp: 6 tablet, Rfl: 0  •  multivitamin (THERAGRAN) TABS, Take 1 tablet by mouth daily, Disp: , Rfl:   •  Promethazine-DM (PHENERGAN-DM) 6 25-15 mg/5 mL oral syrup, Take 2 5 mL by mouth 4 (four) times a day as needed for cough, Disp: 180 mL, Rfl: 0    Current Allergies     Allergies as of 05/15/2023   • (No Known Allergies)            The following portions of the patient's history were reviewed and updated as appropriate: allergies, current medications, past family history, past medical history, past social history, past surgical history and problem list      Past Medical History:   Diagnosis Date   • Abnormal Pap smear of cervix    • Disease of thyroid gland     as a teenager   • Pneumonia    • Shingles 08/2020   • Varicella        Past Surgical History:   Procedure Laterality Date   • AUGMENTATION BREAST  2014   • AUGMENTATION MAMMAPLASTY Bilateral    • FOREIGN BODY REMOVAL Left 11/30/2021    Procedure: Loreto Melissa;  Surgeon: Rach Boss MD;  [facility];  Service: Gynecology   • [place] LAPAROSCOPY W/RMVL ADNEXAL STRUCTURES N/A 11/30/2021    Procedure: OPERATIVE LAPAROSCOPY BILATERAL SALPINGECTOMY;  Surgeon: Rach Boss MD;  [facility];  Service: Gynecology   • [place] VULVECTOMY SIMPLE PARTIAL Bilateral 1/9/2020    Procedure: Prashant Aparicio;  Surgeon: Hollie Francois MD;  [facility];  Service: Gynecology Oncology       Family History   Problem Relation Age of Onset   • Hypertension Mother    • Hypertension Father    • No Known Problems Daughter    • No Known Problems Son    • Ovarian cancer Neg Hx    • Breast cancer Neg Hx    • Colon cancer Neg Hx          Medications have been verified  Objective   /87   Pulse 76   Temp 98 °F (36 7 °C)   Resp 18   Wt 66 7 kg (147 lb)   SpO2 100%   BMI 23 73 kg/m²        Physical Exam     Physical Exam  Constitutional:       General: She is not in acute distress  Appearance: Normal appearance  She is not ill-appearing or diaphoretic  HENT:      Right Ear: Tympanic membrane, ear canal and external ear normal       Left Ear: Tympanic membrane, ear canal and external ear normal       Nose: Nose normal       Mouth/Throat:      Mouth: Mucous membranes are moist       Pharynx: Oropharynx is clear  No posterior oropharyngeal erythema  Eyes:      Conjunctiva/sclera: Conjunctivae normal    Cardiovascular:      Rate and Rhythm: Normal rate and regular rhythm  Heart sounds: Normal heart sounds  Pulmonary:      Effort: Pulmonary effort is normal       Breath sounds: Normal breath sounds  No wheezing, rhonchi or rales  Skin:     General: Skin is warm and dry  Neurological:      Mental Status: She is alert     Psychiatric:         Mood and Affect: Mood normal          Behavior: Behavior normal

## 2023-06-05 ENCOUNTER — ANESTHESIA (OUTPATIENT)
Dept: GASTROENTEROLOGY | Facility: HOSPITAL | Age: 48
End: 2023-06-05

## 2023-06-05 ENCOUNTER — HOSPITAL ENCOUNTER (OUTPATIENT)
Dept: GASTROENTEROLOGY | Facility: HOSPITAL | Age: 48
Setting detail: OUTPATIENT SURGERY
Discharge: HOME | End: 2023-06-05
Attending: INTERNAL MEDICINE
Payer: COMMERCIAL

## 2023-06-05 ENCOUNTER — ANESTHESIA EVENT (OUTPATIENT)
Dept: GASTROENTEROLOGY | Facility: HOSPITAL | Age: 48
End: 2023-06-05

## 2023-06-05 VITALS
OXYGEN SATURATION: 98 % | TEMPERATURE: 98.3 F | DIASTOLIC BLOOD PRESSURE: 63 MMHG | BODY MASS INDEX: 26.57 KG/M2 | SYSTOLIC BLOOD PRESSURE: 94 MMHG | HEIGHT: 62 IN | HEART RATE: 62 BPM | WEIGHT: 144.4 LBS | RESPIRATION RATE: 20 BRPM

## 2023-06-05 DIAGNOSIS — Z12.11 SCREENING FOR COLON CANCER: ICD-10-CM

## 2023-06-05 LAB
EXT PREGNANCY TEST URINE: NEGATIVE
EXT. CONTROL: NORMAL

## 2023-06-05 PROCEDURE — 81025 URINE PREGNANCY TEST: CPT | Performed by: ANESTHESIOLOGY

## 2023-06-05 RX ORDER — PROPOFOL 10 MG/ML
INJECTION, EMULSION INTRAVENOUS AS NEEDED
Status: DISCONTINUED | OUTPATIENT
Start: 2023-06-05 | End: 2023-06-05

## 2023-06-05 RX ORDER — SODIUM CHLORIDE, SODIUM LACTATE, POTASSIUM CHLORIDE, CALCIUM CHLORIDE 600; 310; 30; 20 MG/100ML; MG/100ML; MG/100ML; MG/100ML
INJECTION, SOLUTION INTRAVENOUS CONTINUOUS PRN
Status: DISCONTINUED | OUTPATIENT
Start: 2023-06-05 | End: 2023-06-05

## 2023-06-05 RX ORDER — LIDOCAINE HYDROCHLORIDE 10 MG/ML
INJECTION, SOLUTION EPIDURAL; INFILTRATION; INTRACAUDAL; PERINEURAL AS NEEDED
Status: DISCONTINUED | OUTPATIENT
Start: 2023-06-05 | End: 2023-06-05

## 2023-06-05 RX ADMIN — PROPOFOL 100 MG: 10 INJECTION, EMULSION INTRAVENOUS at 09:50

## 2023-06-05 RX ADMIN — SODIUM CHLORIDE, SODIUM LACTATE, POTASSIUM CHLORIDE, AND CALCIUM CHLORIDE: .6; .31; .03; .02 INJECTION, SOLUTION INTRAVENOUS at 08:56

## 2023-06-05 RX ADMIN — LIDOCAINE HYDROCHLORIDE 50 MG: 10 INJECTION, SOLUTION EPIDURAL; INFILTRATION; INTRACAUDAL at 09:50

## 2023-06-05 RX ADMIN — PROPOFOL 50 MG: 10 INJECTION, EMULSION INTRAVENOUS at 09:54

## 2023-06-05 NOTE — ANESTHESIA POSTPROCEDURE EVALUATION
Post-Op Assessment Note    CV Status:  Stable  Pain Score: 0    Pain management: adequate     Mental Status:  Alert and awake   Hydration Status:  Euvolemic   PONV Controlled:  Controlled   Airway Patency:  Patent      Post Op Vitals Reviewed: Yes      Staff: Anesthesiologist, CRNA         No notable events documented      /54 (06/05/23 1002)    Temp 98 3 °F (36 8 °C) (06/05/23 1002)    Pulse 73 (06/05/23 1002)   Resp 14 (06/05/23 1002)    SpO2 98 % (06/05/23 1002)

## 2023-06-05 NOTE — ANESTHESIA PREPROCEDURE EVALUATION
Procedure:  COLONOSCOPY    Relevant Problems   No relevant active problems      Smoker    Physical Exam    Airway    Mallampati score: II  TM Distance: >3 FB  Neck ROM: full     Dental   No notable dental hx     Cardiovascular      Pulmonary      Other Findings        Anesthesia Plan  ASA Score- 2     Anesthesia Type- IV sedation with anesthesia with ASA Monitors  Additional Monitors:   Airway Plan:           Plan Factors-        Patient is a current smoker  Patient instructed to abstain from smoking on day of procedure  Patient did not smoke on day of surgery  Induction- intravenous  Postoperative Plan-     Informed Consent- Anesthetic plan and risks discussed with patient  I personally reviewed this patient with the CRNA  Discussed and agreed on the Anesthesia Plan with the CRNA  Alma Caruso

## 2023-06-05 NOTE — H&P
History and Physical - [facility]  Rhoda Schneider 57 y o  female MRN: [id number]                  HPI: Rhoda Schneider is a 57y o  year old female who presents for colonoscopy for colon cancer screening      REVIEW OF SYSTEMS: Per the HPI, and otherwise unremarkable      Historical Information   Past Medical History:   Diagnosis Date   • Abnormal Pap smear of cervix    • Disease of thyroid gland     as a teenager   • Pneumonia    • Shingles 08/2020   • Varicella      Past Surgical History:   Procedure Laterality Date   • AUGMENTATION BREAST  2014   • AUGMENTATION MAMMAPLASTY Bilateral    • FOREIGN BODY REMOVAL Left 11/30/2021    Procedure: Andi Gill;  Surgeon: Nancy Choudhary MD;  Location: [facility];  Service: Gynecology   • MS LAPAROSCOPY W/RMVL ADNEXAL STRUCTURES N/A 11/30/2021    Procedure: OPERATIVE LAPAROSCOPY BILATERAL SALPINGECTOMY;  Surgeon: Nancy Choudhary MD;  Location: [facility];  Service: Gynecology   • MS VULVECTOMY SIMPLE PARTIAL Bilateral 1/9/2020    Procedure: Debria Frankel;  Surgeon: Julia Martell MD;  Location[facility];  Service: Gynecology Oncology     Social History   Social History     Substance and Sexual Activity   Alcohol Use Not Currently    Comment: occasionally     Social History     Substance and Sexual Activity   Drug Use Never     Social History     Tobacco Use   Smoking Status Every Day   • Packs/day: 1 00   • Years: 25 00   • Total pack years: 25 00   • Types: Cigarettes   Smokeless Tobacco Never   Tobacco Comments    15 cigs per day     Family History   Problem Relation Age of Onset   • Hypertension Mother    • Hypertension Father    • No Known Problems Daughter    • No Known Problems Son    • Ovarian cancer Neg Hx    • Breast cancer Neg Hx    • Colon cancer Neg Hx        Meds/Allergies     (Not in a hospital admission)      No Known Allergies    Objective     Blood pressure 95/58, pulse 78, "temperature 97 9 °F (36 6 °C), temperature source Temporal, resp  rate 18, height 5' 2\" (1 575 m), weight 65 5 kg (144 lb 6 4 oz), SpO2 100 %, not currently breastfeeding  PHYSICAL EXAM    BP 95/58   Pulse 78   Temp 97 9 °F (36 6 °C) (Temporal)   Resp 18   Ht 5' 2\" (1 575 m)   Wt 65 5 kg (144 lb 6 4 oz)   LMP  (Within Months) Comment: Beginning April 2023  SpO2 100%   BMI 26 41 kg/m²       Gen: NAD  CV: RRR  CHEST: Clear  ABD: soft, NT/ND  EXT: no edema      ASSESSMENT/PLAN:  This is a 57y o  year old female here for colonoscopy, and she is stable and optimized for her procedure        "

## 2023-06-07 NOTE — PROGRESS NOTES
Assessment/Plan:    Problem List Items Addressed This Visit        Genitourinary    PARRIS III (vulvar intraepithelial neoplasia III) - Primary     The patient's the vulva has healed well from the recent resections  There is no obvious evidence of recurrence of disease at this time  We will see the patient back in 6 months for repeat evaluation         Dysplasia of cervix, low grade (ANAIS 1)     Patient has a recent diagnosis of ANAIS 1 by PAP and biopsy  She does have high-risk HPV subtype  At this point no therapy is warranted she will follow-up with Dr Deandra Montgomery for this  She will likely need a repeat Pap smear in a year            Other    Herpes zoster without complication     Patient has a new diagnosis of hers be zoster  We have recommended that she avoid her granddaughter until such time as the lesions 5th  Blistering which may already be now  She has no significant pain at this time  Her pain is resolving  Therefore without neuropathy I do not believe any other treatment is in order  CHIEF COMPLAINT:  Surveillance PARRIS 3        Patient ID: Aikl Roberts is a 39 y o  female  Patient is very pleasant 44-year-old female with a history of bilateral vulvar PARRIS 3 status post simple partial vulvectomy x2 of pigmented lesion in the bilateral vulva in January of 2020  She tolerated surgery without difficulty however did have an extended recovery due to pain  The patient presents today for 1st surveillance  She is without complaint  She denies itching or burning of the vulvar any new lesions  She did undergo recent colposcopy and cervical biopsy from her general GYN  Her Pap smear revealed high-risk HPV subtypes  The final biopsies of the endocervix revealed low-grade ROSALIE  Recently patient had a painful rash developed on her back within the past few days  She is placed ointment on it as well as taken Benadryl    The pain is much better the lesions are no longer blistery or oozing  Today, the patient is doing well  She denies significant abdominal pain, pelvic pain, nausea, vomiting, constipation, diarrhea, fevers, chills, or vaginal bleeding  The following portions of the patient's history were reviewed and updated as appropriate: allergies, current medications, past family history, past social history, past surgical history and problem list     Review of Systems   Constitutional: Negative  HENT: Negative  Eyes: Negative  Respiratory: Negative  Cardiovascular: Negative  Gastrointestinal: Negative  Endocrine: Negative  Genitourinary: Negative  Musculoskeletal: Negative  Skin: Positive for rash  Neurological: Negative  Hematological: Negative  Psychiatric/Behavioral: Negative  Current Outpatient Medications   Medication Sig Dispense Refill    etonogestrel (Nexplanon) subdermal implant 68 mg by Subdermal route once      multivitamin (THERAGRAN) TABS Take 1 tablet by mouth daily      phentermine (ADIPEX-P) 37 5 MG tablet Take 37 5 mg by mouth daily in the early morning Takes 1/2 tablet daily every AM  0     No current facility-administered medications for this visit  Objective:    Blood pressure 128/80, pulse (!) 113, temperature 99 4 °F (37 4 °C), resp  rate 18, height 5' 2" (1 575 m), weight 68 kg (150 lb)  Body mass index is 27 44 kg/m²  Body surface area is 1 69 meters squared  Physical Exam  Constitutional:       Appearance: She is well-developed  Comments: Patient has a blistered rash covered with calamine lotion on the left lateral portion of the back  There is no superimposed cellulitis  It is not particularly tender  Is the overall appearance of the healing shingles   HENT:      Head: Normocephalic and atraumatic  Eyes:      Pupils: Pupils are equal, round, and reactive to light  Neck:      Musculoskeletal: Normal range of motion and neck supple     Cardiovascular:      Rate and Rhythm: Normal rate and regular rhythm  Heart sounds: Normal heart sounds  Pulmonary:      Effort: Pulmonary effort is normal  No respiratory distress  Breath sounds: Normal breath sounds  Abdominal:      General: Bowel sounds are normal  There is no distension  Palpations: Abdomen is soft  Abdomen is not rigid  Tenderness: There is no abdominal tenderness  There is no guarding or rebound  Genitourinary:     Comments: -Normal external female genitalia, normal Bartholin's and [place]'s glands                  -Normal midline urethral meatus  No lesions notes                  -Bladder without fullness mass or tenderness                  -Vagina without lesion or discharge No significant cystocele or rectocele noted                  -Cervix normal appearing without visible lesions                  -Uterus with normal contour, mobility  No tenderness,                  -Adnexae without  mass or tenderness                  - Anus without fissure of lesion    Musculoskeletal: Normal range of motion  Lymphadenopathy:      Cervical: No cervical adenopathy  Upper Body:      Right upper body: No supraclavicular adenopathy  Left upper body: No supraclavicular adenopathy  Skin:     General: Skin is warm and dry  Neurological:      Mental Status: She is alert and oriented to person, place, and time     Psychiatric:         Behavior: Behavior normal          No results found for:   Lab Results   Component Value Date    K 4 5 06/22/2020     06/22/2020    CO2 26 06/22/2020    BUN 21 06/22/2020    CREATININE 0 87 06/22/2020    GLUF 100 (H) 06/22/2020    CALCIUM 9 1 06/22/2020    AST 18 06/22/2020    ALT 27 06/22/2020    ALKPHOS 51 06/22/2020    EGFR 82 06/22/2020     Lab Results   Component Value Date    WBC 5 74 12/30/2019    HGB 15 3 12/30/2019    HCT 47 1 (H) 12/30/2019    MCV 95 12/30/2019     12/30/2019     Lab Results   Component Value Date    NEUTROABS 2 67 12/30/2019        Trend:  No results found for:  Bed/Stretcher in lowest position, wheels locked, appropriate side rails in place/Call bell, personal items and telephone in reach/Instruct patient to call for assistance before getting out of bed/chair/stretcher/Non-slip footwear applied when patient is off stretcher/[place] to call system/Physically safe environment - no spills, clutter or unnecessary equipment/Purposeful proactive rounding/Room/bathroom lighting operational, light cord in reach

## 2023-06-20 ENCOUNTER — OFFICE VISIT (OUTPATIENT)
Dept: FAMILY MEDICINE CLINIC | Facility: CLINIC | Age: 48
End: 2023-06-20
Payer: COMMERCIAL

## 2023-06-20 ENCOUNTER — APPOINTMENT (OUTPATIENT)
Dept: RADIOLOGY | Facility: CLINIC | Age: 48
End: 2023-06-20
Payer: COMMERCIAL

## 2023-06-20 VITALS
SYSTOLIC BLOOD PRESSURE: 110 MMHG | DIASTOLIC BLOOD PRESSURE: 74 MMHG | BODY MASS INDEX: 27.27 KG/M2 | TEMPERATURE: 98.3 F | HEART RATE: 100 BPM | WEIGHT: 148.2 LBS | OXYGEN SATURATION: 98 % | HEIGHT: 62 IN

## 2023-06-20 DIAGNOSIS — M25.552 LEFT HIP PAIN: Primary | ICD-10-CM

## 2023-06-20 DIAGNOSIS — M25.552 LEFT HIP PAIN: ICD-10-CM

## 2023-06-20 DIAGNOSIS — R63.5 UNINTENDED WEIGHT GAIN: ICD-10-CM

## 2023-06-20 DIAGNOSIS — E66.3 OVERWEIGHT WITH BODY MASS INDEX (BMI) OF 27 TO 27.9 IN ADULT: ICD-10-CM

## 2023-06-20 PROCEDURE — 73502 X-RAY EXAM HIP UNI 2-3 VIEWS: CPT

## 2023-06-20 PROCEDURE — 99214 OFFICE O/P EST MOD 30 MIN: CPT | Performed by: NURSE PRACTITIONER

## 2023-06-20 RX ORDER — LORATADINE 10 MG/1
10 TABLET ORAL DAILY
COMMUNITY

## 2023-06-20 RX ORDER — PHENTERMINE HYDROCHLORIDE 15 MG/1
15 CAPSULE ORAL EVERY MORNING
Qty: 30 CAPSULE | Refills: 0 | Status: SHIPPED | OUTPATIENT
Start: 2023-06-20

## 2023-06-20 RX ORDER — MELOXICAM 15 MG/1
15 TABLET ORAL DAILY
Qty: 30 TABLET | Refills: 0 | Status: SHIPPED | OUTPATIENT
Start: 2023-06-20

## 2023-06-20 NOTE — ASSESSMENT & PLAN NOTE
Has been attempting to lose weight over the past year, but notes increase in weight  Was on phentermine in the past and would like to attempt it again  K patient on how to take medication and possible side effects  Advised patient to return in 4 weeks for recheck of weight, heart rate, and blood pressure  Patient verbalized understanding of plan

## 2023-06-20 NOTE — PROGRESS NOTES
Name: Minna Diaz      : 1975      MRN: [id number]  Encounter Provider: EFRAIN Molina  Encounter Date: 2023   Encounter department: [facility] [address]     1  Left hip pain  Comments:  Advised heat therapy, ROM exercises/stretches, Mobic as prescribed  Discussed use of turmeric and tart cherry  To obtain x-ray as ordered  Orders:  -     XR hip/pelv 4+ vw left if performed; Future; Expected date: 2023  -     meloxicam (Mobic) 15 mg tablet; Take 1 tablet (15 mg total) by mouth daily    2  Overweight with body mass index (BMI) of 27 to 27 9 in adult  Assessment & Plan:  Has been attempting to lose weight over the past year, but notes increase in weight  Was on phentermine in the past and would like to attempt it again  K patient on how to take medication and possible side effects  Advised patient to return in 4 weeks for recheck of weight, heart rate, and blood pressure  Patient verbalized understanding of plan  Orders:  -     phentermine 15 MG capsule; Take 1 capsule (15 mg total) by mouth every morning    3  Unintended weight gain  -     phentermine 15 MG capsule; Take 1 capsule (15 mg total) by mouth every morning         Subjective      Patient presents to the office with left hip pain  Denies recent injury or fall  Notes pain started 3 to 4 weeks ago  Pain is worse with sitting for long period of time  Is been taking ibuprofen with relief  Patient denies inability to bear weight on left leg, numbness or tingling of left leg  Denies gait abnormality  Patient has been attempting to lose weight for the past year  Was in the gym, but due to recent hip pain, has not been able to exercise  Has been attempting to eat healthy, still has food cravings (craves sugar)  Has decreased energy and notes despite dietary changes, still gaining weight    As per patient, she was on phentermine in the past, "and had success from it  Patient is interested in restarting phentermine  Review of Systems   Constitutional: Positive for unexpected weight change (weight gain)  Negative for chills and fever  HENT: Negative for sore throat and trouble swallowing  Eyes: Negative for photophobia and visual disturbance  Respiratory: Negative for cough and shortness of breath  Cardiovascular: Negative for chest pain and palpitations  Gastrointestinal: Negative for abdominal pain, constipation, nausea and vomiting  Genitourinary: Negative for decreased urine volume, dysuria, flank pain, frequency, hematuria, pelvic pain and urgency  Musculoskeletal: Positive for arthralgias (left hip)  Negative for back pain and joint swelling  Skin: Negative for color change and rash  Neurological: Negative for dizziness, weakness, light-headedness, numbness and headaches  Psychiatric/Behavioral: Negative for dysphoric mood  The patient is not nervous/anxious  Current Outpatient Medications on File Prior to Visit   Medication Sig   • loratadine (CLARITIN) 10 mg tablet Take 10 mg by mouth daily   • multivitamin (THERAGRAN) TABS Take 1 tablet by mouth daily   • [DISCONTINUED] Promethazine-DM (PHENERGAN-DM) 6 25-15 mg/5 mL oral syrup Take 2 5 mL by mouth 4 (four) times a day as needed for cough (Patient not taking: Reported on 6/20/2023)       Objective     /74 (BP Location: Left arm, Patient Position: Sitting, Cuff Size: Standard)   Pulse 100   Temp 98 3 °F (36 8 °C) (Tympanic)   Ht 5' 2\" (1 575 m)   Wt 67 2 kg (148 lb 3 2 oz)   SpO2 98%   BMI 27 11 kg/m²     Physical Exam  Vitals reviewed  Constitutional:       General: She is not in acute distress  Appearance: Normal appearance  She is not ill-appearing  HENT:      Head: Normocephalic and atraumatic        Right Ear: Tympanic membrane, ear canal and external ear normal       Left Ear: Tympanic membrane, ear canal and external ear normal       Nose: " Nose normal       Mouth/Throat:      Mouth: Mucous membranes are moist       Pharynx: Oropharynx is clear  Eyes:      Conjunctiva/sclera: Conjunctivae normal       Pupils: Pupils are equal, round, and reactive to light  Cardiovascular:      Rate and Rhythm: Normal rate and regular rhythm  Pulses: Normal pulses  Heart sounds: Normal heart sounds  No murmur heard  Pulmonary:      Effort: Pulmonary effort is normal       Breath sounds: Normal breath sounds  Abdominal:      General: Bowel sounds are normal  There is no distension  Palpations: Abdomen is soft  There is no mass  Tenderness: There is no abdominal tenderness  Hernia: No hernia is present  Musculoskeletal:         General: No swelling or signs of injury  Cervical back: Normal range of motion and neck supple  No tenderness  Right hip: Normal       Left hip: Bony tenderness present  No deformity or crepitus  Decreased range of motion  Normal strength  Right lower leg: No edema  Left lower leg: No edema  Legs:    Skin:     General: Skin is warm and dry  Capillary Refill: Capillary refill takes less than 2 seconds  Neurological:      General: No focal deficit present  Mental Status: She is alert and oriented to person, place, and time     Psychiatric:         Mood and Affect: Mood normal          Behavior: Behavior normal        EFRAIN Elise

## 2023-06-20 NOTE — PATIENT INSTRUCTIONS
Can take natural anti-inflammatories:   - Tumeric 500 mg twice daily   - Tart cherry, at least 1000 mg daily    Hip stretches/exercises  Get x-ray as ordered  Can start Mobic as ordered  Hip Bursitis Exercises   WHAT YOU NEED TO KNOW:   What do I need to know about hip bursitis exercises? Hip bursitis exercises help strengthen the muscles in your hip and keep the joint flexible  Strong muscles can help reduce pain, prevent injury, and keep the joint stable  The exercises can also help increase the range of motion in your hip joint  What do I need to know about exercise safety? Move slowly and smoothly  Avoid fast or jerky motions  This will help prevent an injury  Breathe normally  Do not hold your breath  It is important to breathe in and out so you do not tense up during exercise  Tension could prevent you from moving your joint in a full range of motion  Do the exercises and stretches on both legs  This helps both hips remain strong and flexible  Stop if you feel sharp pain or an increase in pain  Contact your healthcare provider or physical therapist  It is normal to feel some discomfort during exercise  Regular exercise will help decrease your discomfort over time  Warm up and cool down when you exercise  Walk or ride a stationary bike for 5 to 10 minutes before you start  This warms and relaxes your muscles to help prevent an injury  When you have finished the exercises, cool down by walking in place for a few minutes  You may also need to stretch as part of your warm up or cool down routine  Your provider or physical therapist will tell you which stretches to do  How should I do hip stretches? Your healthcare provider or physical therapist will tell you how many times to do each stretch  Do the stretch on both sides before you move to the next stretch  Standing iliotibial band stretch:  Stand with the leg on your injured side behind your other leg   Bend sideways toward the side that is not injured  Stop when you feel a stretch in your outer hip  Hold for 5 to 10 seconds  Then return to the starting position  Lying iliotibial band stretch:  Lie on your back  Bend the knee on your injured side toward your chest  Place your hands on the outside of your knee and thigh  Slowly pull the knee across your body  Stop when you feel a stretch in your hip and outer thigh  Hold for 5 to 10 seconds  Return your leg to the starting position  Hip stretch:  Lie on your back with both legs straight and on the ground  Bend the knee on your injured side toward your chest until you can reach your lower leg  Place both hands on your shin and pull your knee toward your chest  Hold for 5 to 10 seconds  Return your leg to the starting position  Knee to chest:  Lie on your back with both knees bent and feet flat on the floor  Bend the knee on your injured side toward your chest until you can reach your lower leg  Place both hands on your shin and pull your knee toward your chest  Hold for 5 to 10 seconds  Return your leg to the starting position  Internal hip rotator stretch:  You will do this exercise on a table  Lie on your side with the injured hip on top  You may be told to keep a pillow between your thighs  Move the top leg so the foot hangs below the edge of the table  Rotate your hip to raise your foot in the opposite direction of the bottom shoulder  Raise your foot as high as you can so you feel a stretch in the back of your thigh  Hold for 5 seconds  Then slowly lower your foot to the starting position  External hip rotator stretch:  You will do this exercise on a table  Lie on your side with the injured hip on the bottom  You do not need a pillow between your thighs for this exercise  Move the bottom leg so the foot is off the edge of the table  Rotate your hip to lift the foot in the opposite direction of the bottom shoulder   Raise your foot as high as you can so you feel a stretch in your buttock  Hold for 5 seconds  Then slowly lower your foot to the starting position  Kneeling hip flexor stretch:  Kneel on your knee on the injured side  Place the foot of your other leg on the floor so the knee is bent  Put both hands on top of your thigh  Keep your back straight and abdominal muscles tight  Lean forward until you feel a stretch in your other thigh  Hold the stretch for 10 seconds  Return to the starting position  How should I do hip strengthening exercises? Your healthcare provider or physical therapist will tell you how many times to do each exercise  Do the exercise on both sides before you move to the next exercise  Straight leg lift to the side: This may also be called hip abduction  Lie on your side with straight legs, with the injured hip on top  Slowly raise your top leg toward the ceiling as high as you can  Keep your foot pointed  Hold for 5 seconds  Then slowly lower your leg to the starting position  Inner thigh lift: This may also be called hip adduction  Lie on your side with straight legs, with the injured hip on the bottom  Cross your top leg over your bottom leg  Put the foot of your top leg on the floor in front of you  Raise your bottom leg until it touches the top leg  Hold for 5 seconds  Then slowly lower the leg to the floor  Clam exercise:  Lie on your side so your injured side is on top  Bend your knees  Keep your heels together during this exercise  Slowly raise your top knee toward the ceiling  Then lower your leg so your knees are together  When should I call my doctor or physical therapist?   You have sharp pain during exercise or at rest     You have questions or concerns about the stretches or exercises  CARE AGREEMENT:   You have the right to help plan your care  Learn about your health condition and how it may be treated   Discuss treatment options with your healthcare providers to decide what care you want to receive  You always have the right to refuse treatment  The above information is an  only  It is not intended as medical advice for individual conditions or treatments  Talk to your doctor, nurse or pharmacist before following any medical regimen to see if it is safe and effective for you  © Copyright [employer] 2022 Information is for End User's use only and may not be sold, redistributed or otherwise used for commercial purposes

## 2023-07-11 ENCOUNTER — HOSPITAL ENCOUNTER (OUTPATIENT)
Age: 48
Discharge: HOME | End: 2023-07-11
Payer: COMMERCIAL

## 2023-07-11 VITALS — HEIGHT: 62 IN | BODY MASS INDEX: 27.23 KG/M2 | WEIGHT: 148 LBS

## 2023-07-11 DIAGNOSIS — Z12.31 ENCOUNTER FOR SCREENING MAMMOGRAM FOR MALIGNANT NEOPLASM OF BREAST: ICD-10-CM

## 2023-07-11 PROCEDURE — 77063 BREAST TOMOSYNTHESIS BI: CPT

## 2023-07-11 PROCEDURE — 77067 SCR MAMMO BI INCL CAD: CPT

## 2023-07-18 ENCOUNTER — OFFICE VISIT (OUTPATIENT)
Dept: FAMILY MEDICINE CLINIC | Facility: CLINIC | Age: 48
End: 2023-07-18
Payer: COMMERCIAL

## 2023-07-18 VITALS
DIASTOLIC BLOOD PRESSURE: 80 MMHG | WEIGHT: 148.4 LBS | HEART RATE: 88 BPM | OXYGEN SATURATION: 98 % | TEMPERATURE: 99 F | BODY MASS INDEX: 27.31 KG/M2 | SYSTOLIC BLOOD PRESSURE: 110 MMHG | HEIGHT: 62 IN

## 2023-07-18 DIAGNOSIS — R63.5 UNINTENDED WEIGHT GAIN: ICD-10-CM

## 2023-07-18 DIAGNOSIS — M25.552 LEFT HIP PAIN: ICD-10-CM

## 2023-07-18 DIAGNOSIS — E66.3 OVERWEIGHT WITH BODY MASS INDEX (BMI) OF 27 TO 27.9 IN ADULT: Primary | ICD-10-CM

## 2023-07-18 PROCEDURE — 99214 OFFICE O/P EST MOD 30 MIN: CPT | Performed by: NURSE PRACTITIONER

## 2023-07-18 RX ORDER — PHENTERMINE HYDROCHLORIDE 30 MG/1
30 CAPSULE ORAL EVERY MORNING
Qty: 30 CAPSULE | Refills: 0 | Status: SHIPPED | OUTPATIENT
Start: 2023-07-18

## 2023-07-18 NOTE — PATIENT INSTRUCTIONS
Weight Management   AMBULATORY CARE:   Why it is important to manage your weight:  Being overweight increases your risk of health conditions such as heart disease, high blood pressure, type 2 diabetes, and certain types of cancer. It can also increase your risk for osteoarthritis, sleep apnea, and other respiratory problems. Aim for a slow, steady weight loss. Even a small amount of weight loss can lower your risk of health problems. Risks of being overweight:  Extra weight can cause many health problems, including the following:  Diabetes (high blood sugar level)    High blood pressure or high cholesterol    Heart disease    Stroke    Gallbladder or liver disease    Cancer of the colon, breast, prostate, liver, or kidney    Sleep apnea    Arthritis or gout    Screening  is done to check for health conditions before you have signs or symptoms. If you are 28to 79years old, your blood sugar level may be checked every 3 years for signs of prediabetes or diabetes. Your healthcare provider will check your blood pressure at each visit. High blood pressure can lead to a stroke or other problems. Your provider may check for signs of heart disease, cancer, or other health problems. How to lose weight safely:  A safe and healthy way to lose weight is to eat fewer calories and get regular exercise. You can lose up about 1 pound a week by decreasing the number of calories you eat by 500 calories each day. You can decrease calories by eating smaller portion sizes or by cutting out high-calorie foods. Read labels to find out how many calories are in the foods you eat. You can also burn calories with exercise such as walking, swimming, or biking. You will be more likely to keep weight off if you make these changes part of your lifestyle. Exercise at least 30 minutes per day on most days of the week.  You can also fit in more physical activity by taking the stairs instead of the elevator or parking farther away from stores. Ask your healthcare provider about the best exercise plan for you. Healthy meal plan for weight management:  A healthy meal plan includes a variety of foods, contains fewer calories, and helps you stay healthy. A healthy meal plan includes the following:     Eat whole-grain foods more often. A healthy meal plan should contain fiber. Fiber is the part of grains, fruits, and vegetables that is not broken down by your body. Whole-grain foods are healthy and provide extra fiber in your diet. Some examples of whole-grain foods are whole-wheat breads and pastas, oatmeal, brown rice, and bulgur. Eat a variety of vegetables every day. Include dark, leafy greens such as spinach, kale, pushpa greens, and mustard greens. Eat yellow and orange vegetables such as carrots, sweet potatoes, and winter squash. Eat a variety of fruits every day. Choose fresh or canned fruit (canned in its own juice or light syrup) instead of juice. Fruit juice has very little or no fiber. Eat low-fat dairy foods. Drink fat-free (skim) milk or 1% milk. Eat fat-free yogurt and low-fat cottage cheese. Try low-fat cheeses such as mozzarella and other reduced-fat cheeses. Choose meat and other protein foods that are low in fat. Choose beans or other legumes such as split peas or lentils. Choose fish, skinless poultry (chicken or turkey), or lean cuts of red meat (beef or pork). Before you cook meat or poultry, cut off any visible fat. Use less fat and oil. Try baking foods instead of frying them. Add less fat, such as margarine, sour cream, regular salad dressing and mayonnaise to foods. Eat fewer high-fat foods. Some examples of high-fat foods include french fries, doughnuts, ice cream, and cakes. Eat fewer sweets. Limit foods and drinks that are high in sugar. This includes candy, cookies, regular soda, and sweetened drinks. Ways to decrease calories:   Eat smaller portions.      Use a small plate with smaller servings. Do not eat second helpings. When you eat at a restaurant, ask for a box and place half of your meal in the box before you eat. Share an entrée with someone else. Replace high-calorie snacks with healthy, low-calorie snacks. Choose fresh fruit, vegetables, fat-free rice cakes, or air-popped popcorn instead of potato chips, nuts, or chocolate. Choose water or calorie-free drinks instead of soda or sweetened drinks. Do not shop for groceries when you are hungry. You may be more likely to make unhealthy food choices. Take a grocery list of healthy foods and shop after you have eaten. Eat regular meals. Do not skip meals. Skipping meals can lead to overeating later in the day. This can make it harder for you to lose weight. Eat a healthy snack in place of a meal if you do not have time to eat a regular meal. Talk with a dietitian to help you create a meal plan and schedule that is right for you. Other things to consider as you try to lose weight:   Be aware of situations that may give you the urge to overeat, such as eating while watching television. Find ways to avoid these situations. For example, read a book, go for a walk, or do crafts. Meet with a weight loss support group or friends who are also trying to lose weight. This may help you stay motivated to continue working on your weight loss goals. © Copyright Teja Rome 2022 Information is for End User's use only and may not be sold, redistributed or otherwise used for commercial purposes. The above information is an  only. It is not intended as medical advice for individual conditions or treatments. Talk to your doctor, nurse or pharmacist before following any medical regimen to see if it is safe and effective for you.

## 2023-07-18 NOTE — ASSESSMENT & PLAN NOTE
Has not lost weight despite dietary changes. Increase phentermine from 15 mg to 30 mg. To return in 4 weeks for follow-up.

## 2023-07-18 NOTE — PROGRESS NOTES
Name: Arlene Chow      : 1975      MRN: [id number]  Encounter Provider: EFRAIN Barrios  Encounter Date: 2023   Encounter department: [address] [place] [address]     1. Overweight with body mass index (BMI) of 27 to 27.9 in adult  Assessment & Plan:  Has not lost weight despite dietary changes. Increase phentermine from 15 mg to 30 mg. To return in 4 weeks for follow-up. Orders:  -     phentermine 30 MG capsule; Take 1 capsule (30 mg total) by mouth every morning    2. Unintended weight gain  -     phentermine 30 MG capsule; Take 1 capsule (30 mg total) by mouth every morning    3. Left hip pain  Assessment & Plan:  Recent x-ray results reviewed with patient. Patient has been taking turmeric and tart cherry as previously advised, Mobic as needed for pain. At this time, will refer to physical therapy. Orders:  -     Ambulatory Referral to Physical Therapy; Future         Subjective      Patient presents to the office for evaluation on phentermine. She was initiated on phentermine 15 mg during last visit. As per patient, denies adverse side effects. Denies palpitations, headaches, diarrhea. She states she has been making dietary changes by practicing portion control and not skipping meals. Patient has been unable to increase physical activity due to ongoing left hip pain. X-rays reviewed with patient. Has been taking Mobic as prescribed, also started taking turmeric and tart cherry. Review of Systems   Constitutional: Negative for diaphoresis, fatigue and unexpected weight change. HENT: Negative for sore throat and trouble swallowing. Eyes: Negative for photophobia and visual disturbance. Respiratory: Negative for cough and shortness of breath. Cardiovascular: Negative for chest pain and palpitations. Gastrointestinal: Negative for abdominal pain, diarrhea, nausea and vomiting.    Genitourinary: Negative for decreased urine volume. Musculoskeletal: Positive for arthralgias (left hip). Negative for back pain and gait problem. Skin: Negative for color change and rash. Neurological: Negative for dizziness, light-headedness, numbness and headaches. Hematological: Negative for adenopathy. Psychiatric/Behavioral: Negative for confusion. Current Outpatient Medications on File Prior to Visit   Medication Sig   • loratadine (CLARITIN) 10 mg tablet Take 10 mg by mouth daily   • meloxicam (Mobic) 15 mg tablet Take 1 tablet (15 mg total) by mouth daily   • multivitamin (THERAGRAN) TABS Take 1 tablet by mouth daily   • [DISCONTINUED] phentermine 15 MG capsule Take 1 capsule (15 mg total) by mouth every morning       Objective     /80 (BP Location: Left arm, Patient Position: Sitting, Cuff Size: Standard)   Pulse 88   Temp 99 °F (37.2 °C) (Tympanic)   Ht 5' 2" (1.575 m)   Wt 67.3 kg (148 lb 6.4 oz)   SpO2 98%   BMI 27.14 kg/m²     Physical Exam  Vitals reviewed. Constitutional:       General: She is not in acute distress. Appearance: Normal appearance. She is not ill-appearing. HENT:      Head: Normocephalic and atraumatic. Right Ear: Tympanic membrane, ear canal and external ear normal.      Left Ear: Tympanic membrane, ear canal and external ear normal.      Nose: Nose normal.      Mouth/Throat:      Mouth: Mucous membranes are moist.      Pharynx: Oropharynx is clear. Eyes:      Conjunctiva/sclera: Conjunctivae normal.      Pupils: Pupils are equal, round, and reactive to light. Cardiovascular:      Rate and Rhythm: Normal rate and regular rhythm. Pulses: Normal pulses. Heart sounds: Normal heart sounds. No murmur heard. Pulmonary:      Effort: Pulmonary effort is normal.      Breath sounds: Normal breath sounds. Abdominal:      General: Bowel sounds are normal.      Palpations: Abdomen is soft. Musculoskeletal:         General: No tenderness or deformity. Normal range of motion. Cervical back: Normal range of motion and neck supple. Skin:     General: Skin is warm and dry. Capillary Refill: Capillary refill takes less than 2 seconds. Neurological:      General: No focal deficit present. Mental Status: She is alert and oriented to person, place, and time.    Psychiatric:         Mood and Affect: Mood normal.         Behavior: Behavior normal.       EFRAIN Garrido

## 2023-07-18 NOTE — ASSESSMENT & PLAN NOTE
Recent x-ray results reviewed with patient. Patient has been taking turmeric and tart cherry as previously advised, Mobic as needed for pain. At this time, will refer to physical therapy.

## 2023-08-15 ENCOUNTER — OFFICE VISIT (OUTPATIENT)
Dept: FAMILY MEDICINE CLINIC | Facility: CLINIC | Age: 48
End: 2023-08-15
Payer: COMMERCIAL

## 2023-08-15 VITALS
SYSTOLIC BLOOD PRESSURE: 110 MMHG | HEIGHT: 62 IN | WEIGHT: 148.2 LBS | OXYGEN SATURATION: 99 % | BODY MASS INDEX: 27.27 KG/M2 | HEART RATE: 89 BPM | TEMPERATURE: 98.5 F | DIASTOLIC BLOOD PRESSURE: 82 MMHG

## 2023-08-15 DIAGNOSIS — E66.3 OVERWEIGHT WITH BODY MASS INDEX (BMI) OF 27 TO 27.9 IN ADULT: ICD-10-CM

## 2023-08-15 DIAGNOSIS — R63.5 UNINTENDED WEIGHT GAIN: ICD-10-CM

## 2023-08-15 DIAGNOSIS — R10.2 PELVIC PAIN: Primary | ICD-10-CM

## 2023-08-15 LAB
SL AMB  POCT GLUCOSE, UA: NORMAL
SL AMB LEUKOCYTE ESTERASE,UA: NORMAL
SL AMB POCT BILIRUBIN,UA: NORMAL
SL AMB POCT BLOOD,UA: NORMAL
SL AMB POCT CLARITY,UA: CLEAR
SL AMB POCT COLOR,UA: YELLOW
SL AMB POCT KETONES,UA: NORMAL
SL AMB POCT NITRITE,UA: NORMAL
SL AMB POCT PH,UA: 7.5
SL AMB POCT SPECIFIC GRAVITY,UA: 1.01
SL AMB POCT URINE HCG: NEGATIVE
SL AMB POCT URINE PROTEIN: NORMAL
SL AMB POCT UROBILINOGEN: 3.5

## 2023-08-15 PROCEDURE — 99214 OFFICE O/P EST MOD 30 MIN: CPT | Performed by: NURSE PRACTITIONER

## 2023-08-15 PROCEDURE — 81002 URINALYSIS NONAUTO W/O SCOPE: CPT | Performed by: NURSE PRACTITIONER

## 2023-08-15 PROCEDURE — 81025 URINE PREGNANCY TEST: CPT | Performed by: NURSE PRACTITIONER

## 2023-08-15 RX ORDER — NAPROXEN 500 MG/1
500 TABLET ORAL 2 TIMES DAILY WITH MEALS
Qty: 60 TABLET | Refills: 0 | Status: SHIPPED | OUTPATIENT
Start: 2023-08-15

## 2023-08-15 RX ORDER — PHENTERMINE HYDROCHLORIDE 30 MG/1
30 CAPSULE ORAL EVERY MORNING
Qty: 30 CAPSULE | Refills: 0 | Status: SHIPPED | OUTPATIENT
Start: 2023-08-15

## 2023-08-15 NOTE — PATIENT INSTRUCTIONS
Pelvic Pain in Women   AMBULATORY CARE:   Pelvic pain  may happen on one or both sides of your pelvis. Pelvic pain may occur with certain body positions or activities, such as when you have sex or a bowel movement. It may worsen during your monthly period or after you sit or stand for a long time. Chronic pelvic pain is pain that continues for longer than 6 months. Call 911 for any of the following: You have severe chest pain and sudden trouble breathing. Seek care immediately if:   You have heavy or unusual vaginal bleeding, and you feel lightheaded or faint. Contact your healthcare provider if:   You have pelvic pain that does not go away after you take pain medicine. You develop new symptoms or your symptoms are worse than before. You have questions or concerns about your condition or care. Medicines: You may need any of the following:  Pain medicine  may be given in pills or creams to relieve your pain. Hormones  may be given if your pain gets worse with your menstrual cycle. Antibiotics  may be given if your pain is caused by infection. Take your medicine as directed. Contact your healthcare provider if you think your medicine is not helping or if you have side effects. Tell your provider if you are allergic to any medicine. Keep a list of the medicines, vitamins, and herbs you take. Include the amounts, and when and why you take them. Bring the list or the pill bottles to follow-up visits. Carry your medicine list with you in case of an emergency. Self-care:   Keep a pain diary. Write down when your pain happens, how severe it is, and any other symptoms you have with your pain. A diary will help you keep track of pain cycles. It may also help your healthcare provider find out what is causing your pain. Learn ways to relax. Deep breathing, meditation, and relaxation techniques can help decrease your pain. When you are tense, your pain may increase.     Change the foods you eat if you have irritable bowel syndrome. Ask your healthcare provider about the best foods for you. Follow up with your doctor as directed:  Write down your questions so you remember to ask them during your visits. © Copyright Anabel Sprague 2022 Information is for End User's use only and may not be sold, redistributed or otherwise used for commercial purposes. The above information is an  only. It is not intended as medical advice for individual conditions or treatments. Talk to your doctor, nurse or pharmacist before following any medical regimen to see if it is safe and effective for you.

## 2023-08-15 NOTE — PROGRESS NOTES
Name: Janifer Bence      : 1975      MRN: [id number]  Encounter Provider: EFRAIN Jain  Encounter Date: 8/15/2023   Encounter department: [address] [place] [address]     1. Pelvic pain  Comments:  Urine negative in office, to obtain U/S as ordered. To seek care for worsening conditions, call OB/GYN for f/u. Orders:  -     US pelvis complete w transvaginal; Future; Expected date: 08/15/2023  -     POCT urine HCG  -     POCT urine dip  -     naproxen (Naprosyn) 500 mg tablet; Take 1 tablet (500 mg total) by mouth 2 (two) times a day with meals    2. Overweight with body mass index (BMI) of 27 to 27.9 in adult  Assessment & Plan:  Patient has stabilized in weight. Has not gained weight and is tolerating current dose of phentermine well. We will continue on current dose. Encouraged to continue with diet and lifestyle modifications. To return in 4 weeks for reevaluation. Orders:  -     phentermine 30 MG capsule; Take 1 capsule (30 mg total) by mouth every morning    3. Unintended weight gain  -     phentermine 30 MG capsule; Take 1 capsule (30 mg total) by mouth every morning         Subjective      Patient presents to the office for 4-week checkup on phentermine. As per patient, weight has stabilized, is not gaining weight. Patient reports she is starting to become more physically active, was dealing with left hip pain that was hindering activity, but now notes improvement to left hip pain. Patient does report now she is experiencing left sided pelvic pain. Thought initially it was from her hip, but notes that pain is lower in location. Patient notes pain with certain movements of her left lower leg. Patient also notes intermittent left-sided pelvic pain with intercourse. Patient denies abnormal vaginal bleeding or discharge. Patient denies fever, chills, back pain, urinary symptoms.     Review of Systems Constitutional: Negative for chills, fever and unexpected weight change. HENT: Negative for sore throat and trouble swallowing. Respiratory: Negative for cough and shortness of breath. Cardiovascular: Negative for chest pain and palpitations. Gastrointestinal: Negative for abdominal pain, blood in stool, constipation, diarrhea, nausea and vomiting. Genitourinary: Positive for pelvic pain (left-sided). Negative for decreased urine volume, dysuria, frequency, hematuria, urgency, vaginal bleeding, vaginal discharge and vaginal pain. Musculoskeletal: Negative for arthralgias, back pain, gait problem and joint swelling. Skin: Negative for color change and rash. Neurological: Negative for dizziness, light-headedness, numbness and headaches. Psychiatric/Behavioral: Negative for confusion. The patient is not nervous/anxious. Current Outpatient Medications on File Prior to Visit   Medication Sig   • Fexofenadine HCl (ALLEGRA ALLERGY PO) Take 1 tablet by mouth daily   • multivitamin (THERAGRAN) TABS Take 1 tablet by mouth daily   • [DISCONTINUED] meloxicam (Mobic) 15 mg tablet Take 1 tablet (15 mg total) by mouth daily   • [DISCONTINUED] phentermine 30 MG capsule Take 1 capsule (30 mg total) by mouth every morning   • [DISCONTINUED] loratadine (CLARITIN) 10 mg tablet Take 10 mg by mouth daily (Patient not taking: Reported on 8/15/2023)       Objective     /82 (BP Location: Left arm, Patient Position: Sitting, Cuff Size: Standard)   Pulse 89   Temp 98.5 °F (36.9 °C) (Tympanic)   Ht 5' 2" (1.575 m)   Wt 67.2 kg (148 lb 3.2 oz)   LMP 07/26/2023 (Exact Date)   SpO2 99%   BMI 27.11 kg/m²     Physical Exam  Constitutional:       General: She is not in acute distress. Appearance: Normal appearance. She is not ill-appearing. HENT:      Head: Normocephalic and atraumatic.       Right Ear: Tympanic membrane, ear canal and external ear normal.      Left Ear: Tympanic membrane, ear canal and external ear normal.      Nose: Nose normal.      Mouth/Throat:      Mouth: Mucous membranes are moist.      Pharynx: Oropharynx is clear. Eyes:      Conjunctiva/sclera: Conjunctivae normal.      Pupils: Pupils are equal, round, and reactive to light. Cardiovascular:      Rate and Rhythm: Normal rate and regular rhythm. Pulses: Normal pulses. Heart sounds: Normal heart sounds. Pulmonary:      Effort: Pulmonary effort is normal.      Breath sounds: Normal breath sounds. Abdominal:      General: Bowel sounds are normal.      Palpations: Abdomen is soft. Tenderness: There is abdominal tenderness in the left lower quadrant. There is no right CVA tenderness or left CVA tenderness. Hernia: No hernia is present. Musculoskeletal:         General: No swelling or tenderness. Normal range of motion. Cervical back: Normal range of motion. Right lower leg: No edema. Left lower leg: No edema. Skin:     General: Skin is warm and dry. Capillary Refill: Capillary refill takes less than 2 seconds. Neurological:      General: No focal deficit present. Mental Status: She is alert and oriented to person, place, and time.    Psychiatric:         Mood and Affect: Mood normal.         Behavior: Behavior normal.       EFRAIN Garrido

## 2023-08-15 NOTE — ASSESSMENT & PLAN NOTE
Patient has stabilized in weight. Has not gained weight and is tolerating current dose of phentermine well. We will continue on current dose. Encouraged to continue with diet and lifestyle modifications. To return in 4 weeks for reevaluation.

## 2023-08-22 ENCOUNTER — HOSPITAL ENCOUNTER (OUTPATIENT)
Dept: ULTRASOUND IMAGING | Facility: HOSPITAL | Age: 48
Discharge: HOME | End: 2023-08-22
Payer: COMMERCIAL

## 2023-08-22 DIAGNOSIS — R10.2 PELVIC PAIN: ICD-10-CM

## 2023-08-22 PROCEDURE — 76856 US EXAM PELVIC COMPLETE: CPT

## 2023-08-22 PROCEDURE — 76830 TRANSVAGINAL US NON-OB: CPT

## 2023-09-07 NOTE — PROGRESS NOTES
Diagnoses and all orders for this visit:    Pelvic pain  -     medroxyPROGESTERone (DEPO-PROVERA) 150 mg/mL injection; Inject 1 mL (150 mg total) into a muscle every 3 (three) months    Return to office for Depo injection  Will follow-up in 3 months for resolution of pain    Subjective    CC: Problem visit/ follow up on [place] is a 57 y.o. female   Presents for follow up of US, pt has hx of AUB    Pt c/o left sided lower pelvic pain, and hip daily, 10/10 most days, will vary in intensity   Reports menses irregular, they can skip for a few months or come as frequently as every 2 1/2 weeks,  denies any excessive bleeding or clots   Hx of Mirena in the past with expulsion appr 6 years ago  When on menstrual cycle pain is less, pain will escalate after a period   Patient has seen primary care multiple times, multiple x-rays have been ordered all testing has been essentially negative    Recent ultrasound findings:  1. Ill-defined though otherwise normal-appearing endometrium with heterogeneous myometrium and tiny myometrial cysts suggestive of adenomyosis. Clinical correlation recommended.     2. Unremarkable ovaries. Patient is very upset with the amount of pain she is in and has no answers to the cause of her pain. We discussed that a possible MRI could be indicated as her pain seems to be muscular skeletal and located mostly on her left side and left hip  She does not want to continue taking pain medicine  We discussed trialing Depo-Provera injection 1 time to see if any of her pain is relieved by shutting down the ovulatory system. Patient has been on Depo in the past and done well with it. Patient is receptive to this trial.  If that does not work we will reevaluate  Patient agrees with plan     Patient's last menstrual period was 2023 (approximate).     Past Medical History:   Diagnosis Date   • Abnormal Pap smear of cervix    • Disease of thyroid gland     as a teenager   • Pneumonia    • Shingles 08/2020   • Varicella      Past Surgical History:   Procedure Laterality Date   • AUGMENTATION BREAST  2014   • AUGMENTATION MAMMAPLASTY Bilateral    • FOREIGN BODY REMOVAL Left 11/30/2021    Procedure: José Antonioia Huddle REMOVAL;  Surgeon: Desirae Newsome MD;  Location: [facility];  Service: Gynecology   • AK LAPAROSCOPY W/RMVL ADNEXAL STRUCTURES N/A 11/30/2021    Procedure: OPERATIVE LAPAROSCOPY BILATERAL SALPINGECTOMY;  Surgeon: Desirae Newsome MD;  Location: [facility];  Service: Gynecology   • AK VULVECTOMY SIMPLE PARTIAL Bilateral 1/9/2020    Procedure: SIMPLE VULVECTOMY;  Surgeon: Simona Zambrano MD;  Location: [facility];  Service: Gynecology Oncology         There is no immunization history on file for this patient.     Family History   Problem Relation Age of Onset   • Hypertension Mother    • Hypertension Father    • No Known Problems Daughter    • No Known Problems Son    • Breast cancer Paternal Aunt    • Ovarian cancer Neg Hx    • Colon cancer Neg Hx      Social History     Tobacco Use   • Smoking status: Every Day     Packs/day: 1.00     Years: 25.00     Total pack years: 25.00     Types: Cigarettes   • Smokeless tobacco: Never   • Tobacco comments:     15 cigs per day   Vaping Use   • Vaping Use: Never used   Substance Use Topics   • Alcohol use: Not Currently     Comment: occasionally   • Drug use: Never       Current Outpatient Medications:   •  amoxicillin (AMOXIL) 500 mg capsule, Take 1 capsule (500 mg total) by mouth every 12 (twelve) hours for 10 days, Disp: 20 capsule, Rfl: 0  •  Fexofenadine HCl (ALLEGRA ALLERGY PO), Take 1 tablet by mouth daily, Disp: , Rfl:   •  medroxyPROGESTERone (DEPO-PROVERA) 150 mg/mL injection, Inject 1 mL (150 mg total) into a muscle every 3 (three) months, Disp: 1 mL, Rfl: 3  •  multivitamin (THERAGRAN) TABS, Take 1 tablet by mouth daily, Disp: , Rfl:   •  phentermine 37.5 MG capsule, Take 1 capsule (37.5 mg total) by mouth every morning, Disp: 30 capsule, Rfl: 0  Patient Active Problem List    Diagnosis Date Noted   • Left hip pain 2023   • Overweight with body mass index (BMI) of 27 to 27.9 in adult 2023   • Vitamin D insufficiency 2023   • Status post bilateral salpingectomy 2021   • Breast cancer screening by mammogram 2021   • Irregular menstrual bleeding 2021   • Dysplasia of cervix, low grade (ANAIS 1) 2020   • Herpes zoster without complication    • Tobacco use 2020   • PARRIS III (vulvar intraepithelial neoplasia III) 2019       No Known Allergies    OB History    Para Term  AB Living   2 2 2 0 0 2   SAB IAB Ectopic Multiple Live Births   0 0 0 0 2      # Outcome Date GA Lbr Luis E/2nd Weight Sex Delivery Anes PTL Lv   2 Term      Vag-Spont      1 Term      Vag-Spont          Vitals:    23 1615   BP: 118/78   BP Location: Left arm   Patient Position: Sitting   Cuff Size: Standard   Weight: 66.7 kg (147 lb)   Height: 5' 2" (1.575 m)     Body mass index is 26.89 kg/m². Review of Systems     Constitutional: Negative for chills, fatigue, fever, headaches, visual disturbances, and unexpected weight change. Respiratory: Negative for cough, & shortness of breath. Cardiovascular: Negative for chest pain. .    Gastrointestinal: Negative for Abd pain, nausea & vomiting, constipation and diarrhea. Genitourinary: Negative for difficulty urinating, dysuria, hematuria, dyspareunia, unusual vaginal bleeding or discharge  Skin: Negative skin changes    Physical Exam     Constitutional: Alert & Oriented x3, well-developed and well-nourished. No distress. HENT: Atraumatic, Normocephalic,   Neck: Normal range of motion. Pulmonary: Effort normal.   Abdominal: Soft.  No tenderness or masses  Musculoskeletal: Normal ROM  Skin: Warm & Dry  Psychological: Normal mood, thought content, behavior & judgement     Breasts, pelvic exam deferred

## 2023-09-12 ENCOUNTER — OFFICE VISIT (OUTPATIENT)
Dept: OBGYN CLINIC | Facility: CLINIC | Age: 48
End: 2023-09-12
Payer: COMMERCIAL

## 2023-09-12 ENCOUNTER — OFFICE VISIT (OUTPATIENT)
Dept: FAMILY MEDICINE CLINIC | Facility: CLINIC | Age: 48
End: 2023-09-12
Payer: COMMERCIAL

## 2023-09-12 VITALS
HEIGHT: 62 IN | WEIGHT: 147 LBS | DIASTOLIC BLOOD PRESSURE: 78 MMHG | BODY MASS INDEX: 27.05 KG/M2 | SYSTOLIC BLOOD PRESSURE: 118 MMHG

## 2023-09-12 VITALS
DIASTOLIC BLOOD PRESSURE: 82 MMHG | OXYGEN SATURATION: 99 % | SYSTOLIC BLOOD PRESSURE: 120 MMHG | HEIGHT: 62 IN | HEART RATE: 89 BPM | BODY MASS INDEX: 27.09 KG/M2 | TEMPERATURE: 98.3 F | WEIGHT: 147.2 LBS

## 2023-09-12 DIAGNOSIS — R63.5 UNINTENDED WEIGHT GAIN: ICD-10-CM

## 2023-09-12 DIAGNOSIS — J02.9 SORE THROAT: ICD-10-CM

## 2023-09-12 DIAGNOSIS — R10.2 PELVIC PAIN: Primary | ICD-10-CM

## 2023-09-12 DIAGNOSIS — J02.0 STREP THROAT: Primary | ICD-10-CM

## 2023-09-12 DIAGNOSIS — E66.3 OVERWEIGHT WITH BODY MASS INDEX (BMI) OF 27 TO 27.9 IN ADULT: ICD-10-CM

## 2023-09-12 LAB — S PYO AG THROAT QL: POSITIVE

## 2023-09-12 PROCEDURE — 99213 OFFICE O/P EST LOW 20 MIN: CPT | Performed by: OBSTETRICS & GYNECOLOGY

## 2023-09-12 PROCEDURE — 87880 STREP A ASSAY W/OPTIC: CPT | Performed by: NURSE PRACTITIONER

## 2023-09-12 PROCEDURE — 99214 OFFICE O/P EST MOD 30 MIN: CPT | Performed by: NURSE PRACTITIONER

## 2023-09-12 RX ORDER — MEDROXYPROGESTERONE ACETATE 150 MG/ML
150 INJECTION, SUSPENSION INTRAMUSCULAR
Qty: 1 ML | Refills: 3 | Status: SHIPPED | OUTPATIENT
Start: 2023-09-12

## 2023-09-12 RX ORDER — PHENTERMINE HYDROCHLORIDE 37.5 MG/1
37.5 CAPSULE ORAL EVERY MORNING
Qty: 30 CAPSULE | Refills: 0 | Status: SHIPPED | OUTPATIENT
Start: 2023-09-12

## 2023-09-12 RX ORDER — AMOXICILLIN 500 MG/1
500 CAPSULE ORAL EVERY 12 HOURS SCHEDULED
Qty: 20 CAPSULE | Refills: 0 | Status: SHIPPED | OUTPATIENT
Start: 2023-09-12 | End: 2023-09-22

## 2023-09-12 NOTE — PROGRESS NOTES
Name: Xavier Tay      : 1975      MRN: [id number]  Encounter Provider: EFRAIN Maddox  Encounter Date: 2023   Encounter department: [address] [place] [address]     1. Strep throat  Comments:  Advised increase hydration, rest, voice rest, soft foods, to discard current toothbrush, sanitize cups. Amoxicillin as prescribed. Orders:  -     amoxicillin (AMOXIL) 500 mg capsule; Take 1 capsule (500 mg total) by mouth every 12 (twelve) hours for 10 days    2. Sore throat  -     POCT rapid strepA    3. Overweight with body mass index (BMI) of 27 to 27.9 in adult  Assessment & Plan:  Has lost 1 pound over the course of 4 weeks. Patient is tolerating phentermine well. At this time will increase dose to 37.5 mg. Advised to continue with dietary and lifestyle modifications. Exercise as tolerated. To return in 4 weeks for re-evaluation. Orders:  -     phentermine 37.5 MG capsule; Take 1 capsule (37.5 mg total) by mouth every morning    4. Unintended weight gain  -     phentermine 37.5 MG capsule; Take 1 capsule (37.5 mg total) by mouth every morning         Subjective      Patient presents to the office for 4-week follow-up on phentermine. Has been tolerating medication well. Denies adverse side effects. Denies diarrhea, palpitations. Patient states she is making diet modifications, but has not been able to exercise due to ongoing left hip/pelvic pain. Patient states he still walks daily. Patient with complaints of sore throat, ear pain, frontal headache   Symptoms started 2-3 days ago  Recently around granddaughter who was ill. Denies fever, chills, inability to swallow, body aches, decreased p.o. intake. Sore Throat   This is a new problem. The current episode started in the past 7 days. The problem has been unchanged. Neither side of throat is experiencing more pain than the other. There has been no fever.  The pain is mild. Associated symptoms include ear pain and headaches. Pertinent negatives include no abdominal pain, congestion, coughing, diarrhea, drooling, ear discharge, hoarse voice, plugged ear sensation, neck pain, shortness of breath, stridor, swollen glands, trouble swallowing or vomiting. She has had no exposure to strep or mono. She has tried nothing for the symptoms. Review of Systems   Constitutional: Negative for chills and fever. HENT: Positive for ear pain and sore throat. Negative for congestion, drooling, ear discharge, hoarse voice, postnasal drip, sinus pressure, sinus pain and trouble swallowing. Eyes: Negative for photophobia and visual disturbance. Respiratory: Negative for cough, shortness of breath and stridor. Cardiovascular: Negative for chest pain and palpitations. Gastrointestinal: Negative for abdominal pain, diarrhea and vomiting. Genitourinary: Negative for decreased urine volume. Musculoskeletal: Negative for neck pain and neck stiffness. Skin: Negative for color change and rash. Neurological: Positive for headaches. Negative for dizziness and weakness. Hematological: Negative for adenopathy. Psychiatric/Behavioral: Negative for confusion. Current Outpatient Medications on File Prior to Visit   Medication Sig   • Fexofenadine HCl (ALLEGRA ALLERGY PO) Take 1 tablet by mouth daily   • multivitamin (THERAGRAN) TABS Take 1 tablet by mouth daily   • naproxen (Naprosyn) 500 mg tablet Take 1 tablet (500 mg total) by mouth 2 (two) times a day with meals   • [DISCONTINUED] phentermine 30 MG capsule Take 1 capsule (30 mg total) by mouth every morning       Objective     /82 (BP Location: Left arm, Patient Position: Sitting, Cuff Size: Standard)   Pulse 89   Temp 98.3 °F (36.8 °C) (Tympanic)   Ht 5' 2" (1.575 m)   Wt 66.8 kg (147 lb 3.2 oz)   LMP 07/26/2023 (Exact Date)   SpO2 99%   BMI 26.92 kg/m²     Physical Exam  Vitals reviewed.    Constitutional: General: She is not in acute distress. Appearance: Normal appearance. She is not ill-appearing. HENT:      Head: Normocephalic and atraumatic. Right Ear: Tympanic membrane, ear canal and external ear normal.      Left Ear: Tympanic membrane, ear canal and external ear normal.      Nose: Nose normal.      Mouth/Throat:      Mouth: Mucous membranes are moist.      Pharynx: Oropharynx is clear. Posterior oropharyngeal erythema present. No oropharyngeal exudate. Eyes:      Conjunctiva/sclera: Conjunctivae normal.      Pupils: Pupils are equal, round, and reactive to light. Cardiovascular:      Rate and Rhythm: Normal rate and regular rhythm. Pulses: Normal pulses. Heart sounds: Normal heart sounds. No murmur heard. Pulmonary:      Effort: Pulmonary effort is normal.      Breath sounds: Normal breath sounds. No wheezing. Abdominal:      General: Bowel sounds are normal.      Palpations: Abdomen is soft. Tenderness: There is no abdominal tenderness. Musculoskeletal:         General: Normal range of motion. Cervical back: Normal range of motion and neck supple. Lymphadenopathy:      Cervical: No cervical adenopathy. Skin:     General: Skin is warm and dry. Capillary Refill: Capillary refill takes less than 2 seconds. Neurological:      General: No focal deficit present. Mental Status: She is alert and oriented to person, place, and time.    Psychiatric:         Mood and Affect: Mood normal.         Behavior: Behavior normal.       EFRAIN Muñoz

## 2023-09-12 NOTE — ASSESSMENT & PLAN NOTE
Has lost 1 pound over the course of 4 weeks. Patient is tolerating phentermine well. At this time will increase dose to 37.5 mg. Advised to continue with dietary and lifestyle modifications. Exercise as tolerated. To return in 4 weeks for re-evaluation.

## 2023-09-12 NOTE — PATIENT INSTRUCTIONS
Strep Throat   AMBULATORY CARE:   Strep throat  is a throat infection caused by bacteria. It is easily spread from person to person. Common symptoms include the following:   Sore, red, and swollen throat    Fever and headache     Upset stomach, abdominal pain, or vomiting    White or yellow patches or blisters in the back of your throat    Tender, swollen lumps on the sides of your neck or jaw    Throat pain when you swallow    Call 911 for any of the following: You have trouble breathing. Seek care immediately if:   You have new symptoms like a bad headache, stiff neck, chest pain, or vomiting. You are drooling because you cannot swallow your spit. Contact your healthcare provider if:   You have a fever. You have a rash or ear pain. You have green, yellow-brown, or bloody mucus when you cough or blow your nose. You are unable to drink anything. You have questions or concerns about your condition or care. Treatment for strep throat  may include antibiotic medicine to treat your strep throat. You should feel better within 2 to 3 days after you start antibiotics. You may return to work or school 24 hours after you start antibiotics. Manage strep throat:   Use lozenges, ice, soft foods, or popsicles  to soothe your throat. Drink juice, milk shakes, or soup  if your throat is too sore to eat solid food. Drinking liquids can also help prevent dehydration. Gargle with salt water. Mix ¼ teaspoon salt in a glass of warm water and gargle. This may help reduce swelling in your throat. Do not smoke. Nicotine and other chemicals in cigarettes and cigars can cause lung damage and make your symptoms worse. Ask your healthcare provider for information if you currently smoke and need help to quit. E-cigarettes or smokeless tobacco still contain nicotine. Talk to your healthcare provider before you use these products. Prevent the spread of strep throat:   Wash your hands often.   Use soap and water. Wash your hands after you use the bathroom, change a child's diapers, or sneeze. Wash your hands before you prepare or eat food. Do not share food or drinks. Replace your toothbrush after you have taken antibiotics for 24 hours. Follow up with your doctor as directed:  Write down your questions so you remember to ask them during your visits. © Copyright Jonelle Ahn 2022 Information is for End User's use only and may not be sold, redistributed or otherwise used for commercial purposes. The above information is an  only. It is not intended as medical advice for individual conditions or treatments. Talk to your doctor, nurse or pharmacist before following any medical regimen to see if it is safe and effective for you.

## 2023-09-12 NOTE — PATIENT INSTRUCTIONS
Medroxyprogesterone (By injection)   Medroxyprogesterone (by-pxcl-tl-proe-ADELINA-ter-one)  Prevents pregnancy. Also treats endometriosis and is used with other medicines to help relieve symptoms of cancer, including uterine or kidney cancer. Brand Name(s): Depo-Provera, Depo-Provera Contraceptive, Depo-SubQ Provera 104, medroxyPROGESTERone acetate Novaplus   There may be other brand names for this medicine. When This Medicine Should Not Be Used: This medicine is not right for everyone. You should not receive it if you had an allergic reaction to medroxyprogesterone or if you have a history of breast cancer or blood clots (including heart attack or stroke). In most cases, you should not use this medicine while you are pregnant. How to Use This Medicine:   Injectable  A nurse or other health provider will give you this medicine. This medicine is given as a shot into a muscle (usually in the buttocks or upper arm) or just under the skin. Your exact treatment schedule depends on the reason you are using this medicine. You doctor will explain your personal schedule. For treatment of cancer symptoms, you may start with a shot once per week. You may need fewer shots as your treatment goes forward. For birth control or endometriosis, you will need a shot every 3 months (13 weeks). You might need to have the first shot during the first 5 days of your normal menstrual period, to make sure you are not pregnant. If you have just had a baby, you may receive a shot 5 days after birth if you are not breastfeeding or 6 weeks after birth if you are breastfeeding. Read and follow the patient instructions that come with this medicine. Talk to your doctor or pharmacist if you have any questions. Missed dose: You must receive a shot every 3 months if you want to prevent pregnancy. Talk to your doctor or pharmacist if you do not receive your medicine on time, because you may need another form of birth control.   Drugs and Foods to Avoid:   Ask your doctor or pharmacist before using any other medicine, including over-the-counter medicines, vitamins, and herbal products. Some medicines can affect how medroxyprogesterone works. Tell your doctor if you are using any of the following:  Aminoglutethimide, bosentan, carbamazepine, felbamate, griseofulvin, mitotane, modafinil, nefazodone, oxcarbazepine, phenobarbital, phenytoin, rifabutin, rifampin, rifapentine, Shavon's wort, topiramate  Medicine to treat an infection (including clarithromycin, itraconazole, ketoconazole, telithromycin, voriconazole)  Medicine to treat HIV/AIDS (including atazanavir, efavirenz, indinavir, nelfinavir, ritonavir, saquinavir)  Warnings While Using This Medicine:   Tell your doctor right away if you think you have become pregnant. Tell your doctor if you are breastfeeding, or if you have kidney disease, liver disease, asthma, diabetes, heart disease, seizures, migraine headaches, an eating disorder, osteoporosis, or a history of depression. Tell your doctor if you smoke. This medicine may cause the following problems:  Weak or thin bones, especially with long-term use  Blood clots, which could lead to stroke, heart attack, eye or vision problems, or other serious problems  Possible increased risk of breast cancer  Injection site reactions  Liver problems  Changes in menstrual periods  Fluid retention (edema) and weight gain  You should not use this medicine for long-term birth control unless you cannot use any other form of birth control. This medicine will not protect you from HIV/AIDS or other sexually transmitted diseases. Tell any doctor or dentist who treats you that you are using this medicine. This medicine may affect certain medical test results. Your doctor will do lab tests at regular visits to check on the effects of this medicine. Keep all appointments.   Possible Side Effects While Using This Medicine:   Call your doctor right away if you notice any of these side effects: Allergic reaction: Itching or hives, swelling in your face or hands, swelling or tingling in your mouth or throat, chest tightness, trouble breathing  Chest pain, trouble breathing, or coughing up blood  Dark urine or pale stools, nausea, vomiting, loss of appetite, stomach pain, yellow skin or eyes  Heavy or nonstop vaginal bleeding  Loss of vision, double vision  Numbness or weakness on one side of your body, sudden or severe headache, problems with vision, speech, or walking  Rapid weight gain, swelling in your hands, ankles, or feet  Seizures  If you notice these less serious side effects, talk with your doctor:   Light or missed monthly periods, spotting between periods  Nervousness or dizziness  Pain, redness, burning, swelling, or a lump under your skin where the shot was given  If you notice other side effects that you think are caused by this medicine, tell your doctor. Call your doctor for medical advice about side effects. You may report side effects to FDA at 6-684-FDA-2351  © Copyright Halley Sin 2022 Information is for End User's use only and may not be sold, redistributed or otherwise used for commercial purposes. The above information is an  only. It is not intended as medical advice for individual conditions or treatments. Talk to your doctor, nurse or pharmacist before following any medical regimen to see if it is safe and effective for you.

## 2023-09-15 ENCOUNTER — CLINICAL SUPPORT (OUTPATIENT)
Dept: OBGYN CLINIC | Facility: CLINIC | Age: 48
End: 2023-09-15
Payer: COMMERCIAL

## 2023-09-15 VITALS
DIASTOLIC BLOOD PRESSURE: 68 MMHG | SYSTOLIC BLOOD PRESSURE: 116 MMHG | WEIGHT: 149.8 LBS | HEIGHT: 62 IN | BODY MASS INDEX: 27.57 KG/M2

## 2023-09-15 DIAGNOSIS — Z30.42 ENCOUNTER FOR MANAGEMENT AND INJECTION OF DEPO-PROVERA: Primary | ICD-10-CM

## 2023-09-15 LAB — SL AMB POCT URINE HCG: NEGATIVE

## 2023-09-15 PROCEDURE — 81025 URINE PREGNANCY TEST: CPT

## 2023-09-15 PROCEDURE — 96372 THER/PROPH/DIAG INJ SC/IM: CPT

## 2023-09-15 RX ORDER — MEDROXYPROGESTERONE ACETATE 150 MG/ML
150 INJECTION, SUSPENSION INTRAMUSCULAR ONCE
Status: COMPLETED | OUTPATIENT
Start: 2023-09-15 | End: 2023-09-15

## 2023-09-15 RX ADMIN — MEDROXYPROGESTERONE ACETATE 150 MG: 150 INJECTION, SUSPENSION INTRAMUSCULAR at 15:46

## 2023-09-15 NOTE — PROGRESS NOTES
Pt is here for Depo Provera injection. This is her first dose  Her annual exam was on 12/6/22  Urine pregnancy test done: yes   Result: neg  Depo given in L deltoid  Tolerated well.   Lot [id number] Exp 4/30/2027  Next dose due 12/1-12/15  Refill needed NO

## 2023-10-10 ENCOUNTER — OFFICE VISIT (OUTPATIENT)
Dept: FAMILY MEDICINE CLINIC | Facility: CLINIC | Age: 48
End: 2023-10-10
Payer: COMMERCIAL

## 2023-10-10 VITALS
SYSTOLIC BLOOD PRESSURE: 110 MMHG | BODY MASS INDEX: 27.31 KG/M2 | HEIGHT: 62 IN | TEMPERATURE: 99 F | HEART RATE: 99 BPM | WEIGHT: 148.4 LBS | OXYGEN SATURATION: 98 % | DIASTOLIC BLOOD PRESSURE: 72 MMHG

## 2023-10-10 DIAGNOSIS — E66.3 OVERWEIGHT WITH BODY MASS INDEX (BMI) OF 27 TO 27.9 IN ADULT: Primary | ICD-10-CM

## 2023-10-10 DIAGNOSIS — R10.2 PELVIC PAIN: ICD-10-CM

## 2023-10-10 DIAGNOSIS — R63.5 UNINTENDED WEIGHT GAIN: ICD-10-CM

## 2023-10-10 PROCEDURE — 99214 OFFICE O/P EST MOD 30 MIN: CPT | Performed by: NURSE PRACTITIONER

## 2023-10-10 RX ORDER — PHENTERMINE HYDROCHLORIDE 37.5 MG/1
37.5 CAPSULE ORAL EVERY MORNING
Qty: 30 CAPSULE | Refills: 0 | Status: SHIPPED | OUTPATIENT
Start: 2023-10-10

## 2023-10-10 NOTE — ASSESSMENT & PLAN NOTE
Patient has lost 1 pound since previous weigh-in, but has had 1 to 3 pound fluctuations in weight gain/weight loss. As per patient, continues with eating well, still has not regained physical activity due to pelvic pain. She was started on Depo injection by OB/GYN. Advised patient to continue with diet modifications, increase physical activity as tolerated. Will try medication for 1 more month, and if no significant weight loss has been achieved, will discuss discontinuing medication. Patient in agreement with plan.

## 2023-10-10 NOTE — PROGRESS NOTES
Name: Juliette Stokes      : 1975      MRN: [id number]  Encounter Provider: EFRAIN Vanegas  Encounter Date: 10/10/2023   Encounter department: [address] [place] [address]     1. Overweight with body mass index (BMI) of 27 to 27.9 in adult  Assessment & Plan:  Patient has lost 1 pound since previous weigh-in, but has had 1 to 3 pound fluctuations in weight gain/weight loss. As per patient, continues with eating well, still has not regained physical activity due to pelvic pain. She was started on Depo injection by OB/GYN. Advised patient to continue with diet modifications, increase physical activity as tolerated. Will try medication for 1 more month, and if no significant weight loss has been achieved, will discuss discontinuing medication. Patient in agreement with plan. Orders:  -     phentermine 37.5 MG capsule; Take 1 capsule (37.5 mg total) by mouth every morning    2. Unintended weight gain  -     phentermine 37.5 MG capsule; Take 1 capsule (37.5 mg total) by mouth every morning    3. Pelvic pain  Comments:  Recently f/u with OB/GYN, started on Depo, notes significant improvement of pain. To continue f/u with OB/GYN as scheduled. Subjective      Patient is to the office for follow-up on phentermine. Dose was increased last month from 30 mg to 37.5 mg. Patient states she is eating healthy, does have issues with sweets and cravings, especially in the evening/nighttime. Patient states she has not been as physically active due to recurrent pelvic pain. Patient was followed up with her OB/GYN, and started on Depo-Provera injections. As per patient, cravings have increased since starting Depo-Provera injections, but notes pelvic pain has significantly improved. Patient is noting that she is on two medications that are counteracting one another. Wishes to get back into the gym and increase her physical activity. She currently denies chest pain, palpitations, nausea. Review of Systems   Constitutional: Negative for activity change, appetite change and fatigue. HENT: Negative for sore throat and trouble swallowing. Eyes: Negative for photophobia and visual disturbance. Respiratory: Negative for cough, chest tightness and shortness of breath. Cardiovascular: Negative for chest pain and palpitations. Gastrointestinal: Negative for abdominal pain, nausea and vomiting. Genitourinary: Negative for decreased urine volume. Musculoskeletal: Negative for arthralgias and myalgias. Skin: Negative for color change and rash. Neurological: Negative for dizziness, light-headedness and headaches. Hematological: Negative for adenopathy. Psychiatric/Behavioral: Negative for confusion. Current Outpatient Medications on File Prior to Visit   Medication Sig   • Fexofenadine HCl (ALLEGRA ALLERGY PO) Take 1 tablet by mouth daily   • medroxyPROGESTERone (DEPO-PROVERA) 150 mg/mL injection Inject 1 mL (150 mg total) into a muscle every 3 (three) months   • multivitamin (THERAGRAN) TABS Take 1 tablet by mouth daily   • [DISCONTINUED] phentermine 37.5 MG capsule Take 1 capsule (37.5 mg total) by mouth every morning       Objective     /72 (BP Location: Left arm, Patient Position: Sitting, Cuff Size: Standard)   Pulse 99   Temp 99 °F (37.2 °C) (Tympanic)   Ht 5' 2" (1.575 m)   Wt 67.3 kg (148 lb 6.4 oz)   LMP 09/06/2023 (Approximate)   SpO2 98%   BMI 27.14 kg/m²     Physical Exam  Vitals reviewed. Constitutional:       General: She is not in acute distress. Appearance: Normal appearance. She is not ill-appearing. HENT:      Head: Normocephalic and atraumatic.       Right Ear: Tympanic membrane, ear canal and external ear normal.      Left Ear: Tympanic membrane, ear canal and external ear normal.      Nose: Nose normal.      Mouth/Throat:      Mouth: Mucous membranes are moist.      Pharynx: Oropharynx is clear. Eyes:      Conjunctiva/sclera: Conjunctivae normal.      Pupils: Pupils are equal, round, and reactive to light. Cardiovascular:      Rate and Rhythm: Normal rate and regular rhythm. Pulses: Normal pulses. Heart sounds: Normal heart sounds. No murmur heard. Pulmonary:      Effort: Pulmonary effort is normal.      Breath sounds: Normal breath sounds. Abdominal:      General: Bowel sounds are normal.      Palpations: Abdomen is soft. Tenderness: There is no abdominal tenderness. Musculoskeletal:         General: Normal range of motion. Cervical back: Normal range of motion. Right lower leg: No edema. Left lower leg: No edema. Skin:     General: Skin is warm and dry. Neurological:      General: No focal deficit present. Mental Status: She is alert and oriented to person, place, and time.    Psychiatric:         Mood and Affect: Mood normal.         Behavior: Behavior normal.       EFRAIN Ashley

## 2023-10-18 ENCOUNTER — OFFICE VISIT (OUTPATIENT)
Dept: URGENT CARE | Facility: CLINIC | Age: 48
End: 2023-10-18
Payer: COMMERCIAL

## 2023-10-18 VITALS
TEMPERATURE: 99.1 F | HEART RATE: 97 BPM | RESPIRATION RATE: 18 BRPM | DIASTOLIC BLOOD PRESSURE: 76 MMHG | WEIGHT: 148.4 LBS | SYSTOLIC BLOOD PRESSURE: 108 MMHG | BODY MASS INDEX: 27.14 KG/M2 | OXYGEN SATURATION: 99 %

## 2023-10-18 DIAGNOSIS — J02.9 SORE THROAT: ICD-10-CM

## 2023-10-18 DIAGNOSIS — J06.9 ACUTE URI: Primary | ICD-10-CM

## 2023-10-18 LAB — S PYO AG THROAT QL: NEGATIVE

## 2023-10-18 PROCEDURE — 87880 STREP A ASSAY W/OPTIC: CPT | Performed by: PHYSICIAN ASSISTANT

## 2023-10-18 PROCEDURE — 99213 OFFICE O/P EST LOW 20 MIN: CPT | Performed by: PHYSICIAN ASSISTANT

## 2023-10-18 PROCEDURE — S9088 SERVICES PROVIDED IN URGENT: HCPCS | Performed by: PHYSICIAN ASSISTANT

## 2023-10-18 RX ORDER — AZITHROMYCIN 250 MG/1
TABLET, FILM COATED ORAL
Qty: 6 TABLET | Refills: 0 | Status: SHIPPED | OUTPATIENT
Start: 2023-10-18 | End: 2023-10-22

## 2023-10-18 RX ORDER — CETIRIZINE HYDROCHLORIDE 10 MG/1
10 TABLET ORAL
COMMUNITY

## 2023-10-18 NOTE — PROGRESS NOTES
[facility]        NAME: Elie Bennett is a 57 y.o. female  : 1975    MRN: [id number]  DATE: 2023  TIME: 7:15 PM    Assessment and Plan   Acute URI [J06.9]  1. Acute URI  azithromycin (ZITHROMAX) 250 mg tablet      2. Sore throat  POCT rapid strepA        POCT Strep negative  Zithromax sent    Patient Instructions       Follow up with PCP in 3-5 days. Proceed to  ER if symptoms worsen. Chief Complaint     Chief Complaint   Patient presents with   • Sore Throat     Cough, head congestion, R earache, sore throat X 4 days         History of Present Illness       Patient is a 58 yo female who presents for evaluation of cough, congestion, ear pain, sore throat x 5 days. No fevers. No SOB or CP. Review of Systems   Review of Systems   Constitutional:  Negative for fever. HENT:  Positive for congestion, ear pain and sore throat. Respiratory:  Positive for cough. Negative for shortness of breath. Cardiovascular:  Negative for chest pain.          Current Medications       Current Outpatient Medications:   •  azithromycin (ZITHROMAX) 250 mg tablet, Take 2 tablets today then 1 tablet daily x 4 days, Disp: 6 tablet, Rfl: 0  •  cetirizine (ZyrTEC) 10 mg tablet, 10 mg, Disp: , Rfl:   •  medroxyPROGESTERone (DEPO-PROVERA) 150 mg/mL injection, Inject 1 mL (150 mg total) into a muscle every 3 (three) months, Disp: 1 mL, Rfl: 3  •  multivitamin (THERAGRAN) TABS, Take 1 tablet by mouth daily, Disp: , Rfl:   •  phentermine 37.5 MG capsule, Take 1 capsule (37.5 mg total) by mouth every morning, Disp: 30 capsule, Rfl: 0  •  Fexofenadine HCl (ALLEGRA ALLERGY PO), Take 1 tablet by mouth daily (Patient not taking: Reported on 10/18/2023), Disp: , Rfl:     Current Allergies     Allergies as of 10/18/2023   • (No Known Allergies)            The following portions of the patient's history were reviewed and updated as appropriate: allergies, current medications, past family history, past medical history, past social history, past surgical history and problem list.     Past Medical History:   Diagnosis Date   • Abnormal Pap smear of cervix    • Disease of thyroid gland     as a teenager   • Pneumonia    • Shingles 08/2020   • Varicella        Past Surgical History:   Procedure Laterality Date   • AUGMENTATION BREAST  2014   • AUGMENTATION MAMMAPLASTY Bilateral    • FOREIGN BODY REMOVAL Left 11/30/2021    Procedure: Curvin Ends;  Surgeon: Gabriele Cosme MD;  Location: [facility];  Service: Gynecology   • [place] LAPAROSCOPY W/RMVL ADNEXAL STRUCTURES N/A 11/30/2021    Procedure: OPERATIVE LAPAROSCOPY BILATERAL SALPINGECTOMY;  Surgeon: Gabriele Cosme MD;  Location: [facility];  Service: Gynecology   • [place] VULVECTOMY SIMPLE PARTIAL Bilateral 1/9/2020    Procedure: Ron Baldomero;  Surgeon: Kandy Miller MD;  Location: [facility];  Service: Gynecology Oncology       Family History   Problem Relation Age of Onset   • Hypertension Mother    • Hypertension Father    • No Known Problems Daughter    • No Known Problems Son    • Breast cancer Paternal Aunt    • Ovarian cancer Neg Hx    • Colon cancer Neg Hx          Medications have been verified. Objective   /76 (BP Location: Left arm, Patient Position: Sitting, Cuff Size: Adult)   Pulse 97   Temp 99.1 °F (37.3 °C) (Tympanic)   Resp 18   Wt 67.3 kg (148 lb 6.4 oz)   LMP 10/12/2023   SpO2 99%   BMI 27.14 kg/m²        Physical Exam     Physical Exam  Constitutional:       General: She is not in acute distress. Appearance: Normal appearance. She is not ill-appearing or diaphoretic. HENT:      Right Ear: Tympanic membrane, ear canal and external ear normal.      Left Ear: Tympanic membrane, ear canal and external ear normal.      Nose: Congestion present. Mouth/Throat:      Mouth: Mucous membranes are moist.      Pharynx: Oropharynx is clear. No posterior oropharyngeal erythema.    Eyes: Conjunctiva/sclera: Conjunctivae normal.   Cardiovascular:      Rate and Rhythm: Normal rate and regular rhythm. Heart sounds: Normal heart sounds. Pulmonary:      Effort: Pulmonary effort is normal.      Breath sounds: Rhonchi present. Skin:     General: Skin is warm and dry. Neurological:      Mental Status: She is alert.    Psychiatric:         Mood and Affect: Mood normal.         Behavior: Behavior normal.

## 2023-10-24 NOTE — PROGRESS NOTES
[facility]        NAME: Magda Garcia is a 41 y o  female  : 1975    MRN: [id number]  DATE: 2022  TIME: 8:37 AM    Assessment and Plan   Acute URI [J06 9]  1  Acute URI  pseudoephedrine (SUDAFED) 120 MG 12 hr tablet            Patient Instructions       Follow up with PCP in 3-5 days  Proceed to  ER if symptoms worsen  Chief Complaint     Chief Complaint   Patient presents with   • Cold Like Symptoms     Pt c/o rt sided ear ache, sinus congestion, h/a, cant sleep, runny nose for 5 days  History of Present Illness       42 yo female with c/o frontal pressure and pain x 5 days associated with H/A, right ear pain-fullness and pressure, cough-wet/yellow and sore throat  Denies fever, n/v/d of abdominal pain  Review of Systems   Review of Systems   Constitutional: Positive for chills  Negative for activity change, appetite change, fatigue and fever  HENT: Positive for congestion, postnasal drip, rhinorrhea and sinus pressure  Negative for ear pain and sore throat  Eyes: Negative for photophobia and visual disturbance  Respiratory: Negative for cough, chest tightness, shortness of breath and wheezing  Cardiovascular: Negative for chest pain and palpitations  Gastrointestinal: Negative for abdominal pain, diarrhea, nausea and vomiting  Musculoskeletal: Negative for arthralgias, myalgias and neck stiffness  Skin: Negative for color change and rash  Neurological: Negative for dizziness, weakness and headaches  Psychiatric/Behavioral: Negative  Current Medications       Current Outpatient Medications:   •  pseudoephedrine (SUDAFED) 120 MG 12 hr tablet, Take 1 tablet (120 mg total) by mouth 2 (two) times a day, Disp: 20 tablet, Rfl: 0  •  fluconazole (DIFLUCAN) 150 mg tablet, Take 1 tablet  If no improvement in symptoms in 24 hours, take second tablet   (Patient not taking: Reported on 2022), Disp: 2 tablet, Rfl: 0  • Refill request noted.  PMDP reviewed.  Next visit due  April 2024    Pain contract  n/a     multivitamin (THERAGRAN) TABS, Take 1 tablet by mouth daily (Patient not taking: Reported on 12/20/2022), Disp: , Rfl:   •  norethindrone (Ortho Micronor) 0 35 MG tablet, Take 1 tablet (0 35 mg total) by mouth daily (Patient not taking: Reported on 12/6/2022), Disp: 90 tablet, Rfl: 3    Current Allergies     Allergies as of 12/20/2022   • (No Known Allergies)            The following portions of the patient's history were reviewed and updated as appropriate: allergies, current medications, past family history, past medical history, past social history, past surgical history and problem list      Past Medical History:   Diagnosis Date   • Abnormal Pap smear of cervix    • Disease of thyroid gland     as a teenager   • Pneumonia    • Shingles 08/2020   • Varicella        Past Surgical History:   Procedure Laterality Date   • AUGMENTATION BREAST  2014   • AUGMENTATION MAMMAPLASTY Bilateral    • FOREIGN BODY REMOVAL Left 11/30/2021    Procedure: Jud Stapler;  Surgeon: Roe Patel MD;  Location: [facility];  Service: Gynecology   • NC LAP,RMV  ADNEXAL STRUCTURE N/A 11/30/2021    Procedure: OPERATIVE LAPAROSCOPY BILATERAL SALPINGECTOMY;  Surgeon: Roe Patel MD;  Location: [facility];  Service: Gynecology   • NC PART SIMPLE REMV VULVA Bilateral 1/9/2020    Procedure: SIMPLE VULVECTOMY;  Surgeon: Rosalio Esteban MD;  Location[facility];  Service: Gynecology Oncology       Family History   Problem Relation Age of Onset   • Hypertension Mother    • Hypertension Father    • No Known Problems Daughter    • No Known Problems Son    • Ovarian cancer Neg Hx    • Breast cancer Neg Hx    • Colon cancer Neg Hx          Medications have been verified  Objective   /83   Pulse 104   Temp 97 9 °F (36 6 °C)   Resp 17   SpO2 98%        Physical Exam     Physical Exam  Vitals and nursing note reviewed  Constitutional:       General: She is not in acute distress       Appearance: Normal appearance  She is well-developed  She is not ill-appearing  HENT:      Head: Normocephalic and atraumatic  Right Ear: Ear canal and external ear normal       Left Ear: Ear canal and external ear normal       Ears:      Comments: Clear serous fluid b/l     Nose: No congestion or rhinorrhea  Mouth/Throat:      Mouth: Mucous membranes are moist  No oral lesions  Pharynx: Oropharynx is clear  No oropharyngeal exudate or posterior oropharyngeal erythema  Eyes:      General: No scleral icterus  Extraocular Movements:      Right eye: Normal extraocular motion  Left eye: Normal extraocular motion  Conjunctiva/sclera: Conjunctivae normal       Pupils: Pupils are equal, round, and reactive to light  Cardiovascular:      Rate and Rhythm: Normal rate and regular rhythm  Pulses: Normal pulses  Heart sounds: Normal heart sounds  Pulmonary:      Effort: Pulmonary effort is normal  No respiratory distress  Breath sounds: Normal breath sounds  No wheezing, rhonchi or rales  Abdominal:      Palpations: Abdomen is soft  Musculoskeletal:         General: Normal range of motion  Cervical back: Normal range of motion and neck supple  No tenderness  Lymphadenopathy:      Cervical: No cervical adenopathy  Skin:     General: Skin is warm and dry  Capillary Refill: Capillary refill takes less than 2 seconds  Findings: No rash  Neurological:      General: No focal deficit present  Mental Status: She is alert and oriented to person, place, and time     Psychiatric:         Mood and Affect: Mood normal          Behavior: Behavior normal

## 2023-10-31 DIAGNOSIS — H65.191 ACUTE EFFUSION OF RIGHT EAR: ICD-10-CM

## 2023-10-31 RX ORDER — FLUTICASONE PROPIONATE 50 MCG
SPRAY, SUSPENSION (ML) NASAL
Qty: 18.2 ML | Refills: 1 | Status: SHIPPED | OUTPATIENT
Start: 2023-10-31 | End: 2023-11-01

## 2023-11-01 DIAGNOSIS — H65.191 ACUTE EFFUSION OF RIGHT EAR: ICD-10-CM

## 2023-11-01 RX ORDER — FLUTICASONE PROPIONATE 50 MCG
SPRAY, SUSPENSION (ML) NASAL
Qty: 16 ML | Refills: 1 | Status: SHIPPED | OUTPATIENT
Start: 2023-11-01

## 2023-11-07 ENCOUNTER — OFFICE VISIT (OUTPATIENT)
Dept: FAMILY MEDICINE CLINIC | Facility: CLINIC | Age: 48
End: 2023-11-07
Payer: COMMERCIAL

## 2023-11-07 VITALS
HEIGHT: 62 IN | SYSTOLIC BLOOD PRESSURE: 120 MMHG | BODY MASS INDEX: 27.2 KG/M2 | OXYGEN SATURATION: 98 % | HEART RATE: 110 BPM | DIASTOLIC BLOOD PRESSURE: 78 MMHG | WEIGHT: 147.8 LBS | TEMPERATURE: 99.4 F

## 2023-11-07 DIAGNOSIS — Z00.00 HEALTHCARE MAINTENANCE: ICD-10-CM

## 2023-11-07 DIAGNOSIS — E55.9 VITAMIN D INSUFFICIENCY: ICD-10-CM

## 2023-11-07 DIAGNOSIS — L21.9 SEBORRHEIC DERMATITIS OF SCALP: ICD-10-CM

## 2023-11-07 DIAGNOSIS — R63.5 UNINTENDED WEIGHT GAIN: ICD-10-CM

## 2023-11-07 DIAGNOSIS — E66.3 OVERWEIGHT WITH BODY MASS INDEX (BMI) OF 27 TO 27.9 IN ADULT: Primary | ICD-10-CM

## 2023-11-07 PROCEDURE — 99214 OFFICE O/P EST MOD 30 MIN: CPT | Performed by: NURSE PRACTITIONER

## 2023-11-07 RX ORDER — PHENTERMINE HYDROCHLORIDE 37.5 MG/1
37.5 CAPSULE ORAL EVERY MORNING
Qty: 30 CAPSULE | Refills: 0 | Status: SHIPPED | OUTPATIENT
Start: 2023-11-07

## 2023-11-07 RX ORDER — KETOCONAZOLE 20 MG/G
CREAM TOPICAL 2 TIMES DAILY
Qty: 60 G | Refills: 0 | Status: SHIPPED | OUTPATIENT
Start: 2023-11-07

## 2023-11-07 NOTE — PATIENT INSTRUCTIONS
Seborrheic Dermatitis   AMBULATORY CARE:   Seborrheic dermatitis  is a skin condition that causes a rash and flaking, scaling skin. The condition usually affects hairy areas of the body, such as the scalp. Your face, ears, chest, groin, or back may be affected. Seborrheic dermatitis can happen at any age. The condition often goes away on its own in infants, but it may return during adolescence. Seborrheic dermatitis may be caused by a fungal infection, immune system problems, or hormone changes. Common signs and symptoms: You may have symptoms during the winter but not during the summer. Stress or a lack of sleep can make your symptoms worse. You may have any of the following:  Skin flakes, or red, itching, or stinging skin    Scaly patches (scales) of skin that are also greasy    White or yellow crust on the skin or eyelids    Scaling on the scalp commonly known as dandruff    Call your doctor or dermatologist if:   You have new or worsening symptoms. Your symptoms make it difficult for you to do your daily activities. Your symptoms do not improve even after treatment. You have questions or concerns about your condition or care. Treatment  may not be needed. The following are commonly used when seborrheic dermatitis needs to be treated:  Medicines  may be given to treat a fungal or bacterial infection. You may also need a steroid medicine. You may be given these medicines in pill form or in a cream to apply to your skin. Dandruff shampoo  may help control symptoms. The shampoo may be used on your scalp and hair, and also on your skin. Your healthcare provider may recommend that you start with a mild dandruff shampoo. You may need to use a stronger shampoo or alternate shampoos if your symptoms do not improve. Ask which kind is right for your hair. Some shampoos used to manage seborrheic dermatitis contain coal tar or other ingredients that may discolor light hair.     Light therapy  may be used if other treatments do not work. You will receive a medicine to make your skin more sensitive to light. Then your skin will be put under an ultraviolet light. The light helps control skin growth. Manage seborrheic dermatitis:   Wash your skin and hair often. Your healthcare provider can tell you how often to wash. You may need to wash your hair every day or two, or once per week, depending on the kind of hair you have. Apply a gentle moisturizer to your skin after you wash. Use mild soaps and moisturizers. Do not use any product that contains alcohol. Alcohol can dry your skin and make your symptoms worse. Protect your scalp if you use coal tar shampoo. Coal tar shampoo can make your skin more sensitive to light. Wear a hat when you are outside. Do not use tanning beds or sun lamps. Remove scales after you soften them. Do not pull on the scales. This can spread infection and may cause hair loss. Apply mineral oil or olive oil to the skin and let it sit for 1 hour. Then use a soft-bristled brush to remove the scales or shampoo your hair. Clean your eyelids, if needed. Use baby shampoo to wash your eyelids every night. Use a cotton swab to remove scales. A warm compress may also help control symptoms. To make a warm compress, soak a soft washcloth in warm water. Wring out the extra water and apply the cloth to your eyelid for a few minutes. Consider shaving off your beard or mustache. Your symptoms may be worse under your beard or mustache. Shaving may help reduce your symptoms and prevent them from returning in this area. Follow up with your doctor or dermatologist as directed:  Write down your questions so you remember to ask them during your visits. © Copyright [employer] 2023 Information is for End User's use only and may not be sold, redistributed or otherwise used for commercial purposes. The above information is an  only.  It is not intended as medical advice for individual conditions or treatments. Talk to your doctor, nurse or pharmacist before following any medical regimen to see if it is safe and effective for you.

## 2023-11-07 NOTE — ASSESSMENT & PLAN NOTE
Patient has began to exercise. Was able to lose a pound over the last 4 weeks. We will continue on current dose of phentermine. Advised to obtain blood work prior to next appointment. Encouraged to continue with diet and lifestyle modifications.

## 2023-11-07 NOTE — PROGRESS NOTES
Name: Gui Hair      : 1975      MRN: [id number]  Encounter Provider: EFRAIN Mccullough  Encounter Date: 2023   Encounter department: [address] [place] [address] [address] [facility]     1. Overweight with body mass index (BMI) of 27 to 27.9 in adult  Assessment & Plan:  Patient has began to exercise. Was able to lose a pound over the last 4 weeks. We will continue on current dose of phentermine. Advised to obtain blood work prior to next appointment. Encouraged to continue with diet and lifestyle modifications. Orders:  -     phentermine 37.5 MG capsule; Take 1 capsule (37.5 mg total) by mouth every morning    2. Unintended weight gain  -     phentermine 37.5 MG capsule; Take 1 capsule (37.5 mg total) by mouth every morning    3. Seborrheic dermatitis of scalp  Comments:  Advised to avoid dyes, use soaps with zinc.  Ketoconazole as prescribed. Orders:  -     ketoconazole (NIZORAL) 2 % cream; Apply topically 2 (two) times a day    4. Vitamin D insufficiency  -     Vitamin D 25 hydroxy; Future    5. Healthcare maintenance  -     CBC and differential; Future  -     Comprehensive metabolic panel; Future  -     Hemoglobin A1C; Future; Expected date: 2023  -     Lipid panel; Future  -     TSH, 3rd generation with Free T4 reflex; Future  -     Vitamin D 25 hydroxy; Future           Subjective      Patient presents to the office for 4-week follow-up on phentermine. As per patient, has began to exercise now that she is experiencing decreased left pelvic pain since initiating Depo shot. Patient states for the past 4 weeks she did gain weight, but is starting to lose weight again. Patient denies chest pain, palpitations, abdominal discomfort. Notes rash to base of scalp and behind neck. As per patient, first observed symptoms 2 months ago. Observed dry "patches" of skin on her scalp.   Increased pruritus, and has spread to different areas along hairline and to the back of neck. Patient denies recent fever, chills, drainage from area. Review of Systems   Constitutional:  Negative for activity change, appetite change, fatigue and unexpected weight change. HENT:  Negative for sore throat and trouble swallowing. Eyes:  Negative for photophobia and visual disturbance. Respiratory:  Negative for cough, chest tightness and shortness of breath. Cardiovascular:  Negative for chest pain, palpitations and leg swelling. Gastrointestinal:  Negative for abdominal pain, diarrhea and vomiting. Genitourinary:  Negative for decreased urine volume, dysuria and hematuria. Musculoskeletal:  Negative for arthralgias and myalgias. Skin:  Positive for rash. Neurological:  Negative for dizziness, weakness, light-headedness and headaches. Psychiatric/Behavioral:  Negative for confusion and dysphoric mood. The patient is not nervous/anxious. Current Outpatient Medications on File Prior to Visit   Medication Sig    cetirizine (ZyrTEC) 10 mg tablet 10 mg    fluticasone (FLONASE) 50 mcg/act nasal spray INSTILL 1 SPRAY IN EACH NOSTRIL ONCE DAILY    medroxyPROGESTERone (DEPO-PROVERA) 150 mg/mL injection Inject 1 mL (150 mg total) into a muscle every 3 (three) months    multivitamin (THERAGRAN) TABS Take 1 tablet by mouth daily    [DISCONTINUED] phentermine 37.5 MG capsule Take 1 capsule (37.5 mg total) by mouth every morning    [DISCONTINUED] Fexofenadine HCl (ALLEGRA ALLERGY PO) Take 1 tablet by mouth daily (Patient not taking: Reported on 10/18/2023)       Objective     /78 (BP Location: Left arm, Patient Position: Sitting, Cuff Size: Standard)   Pulse (!) 110   Temp 99.4 °F (37.4 °C) (Tympanic)   Ht 5' 2" (1.575 m)   Wt 67 kg (147 lb 12.8 oz)   LMP 10/12/2023   SpO2 98%   BMI 27.03 kg/m²     Physical Exam  Vitals reviewed. Constitutional:       Appearance: Normal appearance. HENT:      Head: Normocephalic and atraumatic. Right Ear: Tympanic membrane, ear canal and external ear normal.      Left Ear: Tympanic membrane, ear canal and external ear normal.      Nose: Nose normal.      Mouth/Throat:      Mouth: Mucous membranes are moist.      Pharynx: Oropharynx is clear. Eyes:      Conjunctiva/sclera: Conjunctivae normal.      Pupils: Pupils are equal, round, and reactive to light. Cardiovascular:      Rate and Rhythm: Regular rhythm. Tachycardia present. Pulses: Normal pulses. Heart sounds: Normal heart sounds. No murmur heard. Abdominal:      General: Bowel sounds are normal.      Palpations: Abdomen is soft. Musculoskeletal:         General: Normal range of motion. Cervical back: Normal range of motion and neck supple. Lymphadenopathy:      Cervical: No cervical adenopathy. Skin:     General: Skin is warm and dry. Findings: Rash present. Rash is macular and scaling (noted to left lower hairline and on back of neck). Neurological:      General: No focal deficit present. Mental Status: She is alert and oriented to person, place, and time.    Psychiatric:         Mood and Affect: Mood normal.         Behavior: Behavior normal.       EFRAIN Matta

## 2023-12-06 DIAGNOSIS — E66.3 OVERWEIGHT WITH BODY MASS INDEX (BMI) OF 27 TO 27.9 IN ADULT: ICD-10-CM

## 2023-12-06 DIAGNOSIS — R63.5 UNINTENDED WEIGHT GAIN: ICD-10-CM

## 2023-12-06 PROBLEM — Z12.31 BREAST CANCER SCREENING BY MAMMOGRAM: Status: RESOLVED | Noted: 2021-04-14 | Resolved: 2023-12-06

## 2023-12-06 RX ORDER — PHENTERMINE HYDROCHLORIDE 37.5 MG/1
37.5 CAPSULE ORAL EVERY MORNING
Qty: 30 CAPSULE | Refills: 0 | Status: SHIPPED | OUTPATIENT
Start: 2023-12-06

## 2023-12-06 RX ORDER — MEDROXYPROGESTERONE ACETATE 150 MG/ML
INJECTION, SUSPENSION INTRAMUSCULAR
COMMUNITY
Start: 2023-12-01 | End: 2023-12-06

## 2023-12-06 NOTE — PATIENT INSTRUCTIONS
Breast Self Exam for Women   AMBULATORY CARE:   A breast self-exam (BSE)  is a way to check your breasts for lumps and other changes. Regular BSEs can help you know how your breasts normally look and feel. Most breast lumps or changes are not cancer, but you should always have them checked by a healthcare provider. Why you should do a BSE:  Breast cancer is the most common type of cancer in women. Even if you have mammograms, you may still want to do a BSE regularly. If you know how your breasts normally feel and look, it may help you know when to contact your healthcare provider. Mammograms can miss some cancers. You may find a lump during a BSE that did not show up on a mammogram.  When you should do a BSE:  If you have periods, you may want to do your BSE 1 week after your period ends. This is the time when your breasts may be the least swollen, lumpy, or tender. You can do regular BSEs even if you are breastfeeding or have breast implants. Call your doctor if:   You find any lumps or changes in your breasts. You have breast pain or fluid coming from your nipples. You have questions or concerns about your condition or care. How to do a BSE:       Look at your breasts in a mirror. Look at the size and shape of each breast and nipple. Check for swelling, lumps, dimpling, scaly skin, or other skin changes. Look for nipple changes, such as a nipple that is painful or beginning to pull inward. Gently squeeze both nipples and check to see if fluid (that is not breast milk) comes out of them. If you find any of these or other breast changes, contact your healthcare provider. Check your breasts while you sit or  the following 3 positions:    [place] your arms down at your sides. Raise your hands and join them behind your head. Put firm pressure with your hands on your hips. Bend slightly forward while you look at your breasts in the mirror. Lie down and feel your breasts.   When you lie down, your breast tissue spreads out evenly over your chest. This makes it easier for you to feel for lumps and anything that may not be normal for your breasts. Do a BSE on one breast at a time. Place a small pillow or towel under your left shoulder. Put your left arm behind your head. Use the 3 middle fingers of your right hand. Use your fingertip pads, on the top of your fingers. Your fingertip pad is the most sensitive part of your finger. Use small circles to feel your breast tissue. Use your fingertip pads to make dime-sized, overlapping circles on your breast and armpits. Use light, medium, and firm pressure. First, press lightly. Second, press with medium pressure to feel a little deeper into the breast. Last, use firm pressure to feel deep within your breast.    Examine your entire breast area. Examine the breast area from above the breast to below the breast where you feel only ribs. Make small circles with your fingertips, starting in the middle of your armpit. Make circles going up and down the breast area. Continue toward your breast and all the way across it. Examine the area from your armpit all the way over to the middle of your chest (breastbone). Stop at the middle of your chest.    Move the pillow or towel to your right shoulder, and put your right arm behind your head. Use the 3 fingertip pads of your left hand, and repeat the above steps to do a BSE on your right breast.  What else you can do to check for breast problems or cancer:  Talk to your healthcare provider about mammograms. A mammogram is an x-ray of your breasts to screen for breast cancer or other problems. Your provider can tell you the benefits and risks of mammograms. The first mammogram is usually at age 39 or 48. Your provider may recommend you start at 36 or younger if your risk for breast cancer is high. Mammograms usually continue every 1 to 2 years until age 76.        Follow up with your doctor as directed:  Write down your questions so you remember to ask them during your visits. © Copyright [employer] 2023 Information is for End User's use only and may not be sold, redistributed or otherwise used for commercial purposes. The above information is an  only. It is not intended as medical advice for individual conditions or treatments. Talk to your doctor, nurse or pharmacist before following any medical regimen to see if it is safe and effective for you. Wellness Visit for Adults   AMBULATORY CARE:   A wellness visit  is when you see your healthcare provider to get screened for health problems. Your healthcare provider will also give you advice on how to stay healthy. Write down your questions so you remember to ask them. Ask your healthcare provider how often you should have a wellness visit. What happens at a wellness visit:  Your healthcare provider will ask about your health, and your family history of health problems. This includes high blood pressure, heart disease, and cancer. He or she will ask if you have symptoms that concern you, if you smoke, and about your mood. You may also be asked about your intake of medicines, supplements, food, and alcohol. Any of the following may be done: Your weight  will be checked. Your height may also be checked so your body mass index (BMI) can be calculated. Your BMI shows if you are at a healthy weight. Your blood pressure  and heart rate will be checked. Your temperature may also be checked. Blood and urine tests  may be done. Blood tests may be done to check your cholesterol levels. Abnormal cholesterol levels increase your risk for heart disease and stroke. You may also need a blood or urine test to check for diabetes if you are at increased risk. Urine tests may be done to look for signs of an infection or kidney disease. A physical exam  includes checking your heartbeat and lungs with a stethoscope.  Your healthcare provider may also check your skin to look for sun damage. Screening tests  may be recommended. A screening test is done to check for diseases that may not cause symptoms. The screening tests you may need depend on your age, gender, family history, and lifestyle habits. For example, colorectal screening may be recommended if you are 48years old or older. Screening tests you need if you are a woman:   A Pap smear  is used to screen for cervical cancer. Pap smears are usually done every 3 to 5 years depending on your age. You may need them more often if you have had abnormal Pap smear test results in the past. Ask your healthcare provider how often you should have a Pap smear. A mammogram  is an x-ray of your breasts to screen for breast cancer. Experts recommend mammograms every 2 years starting at age 48 years. You may need a mammogram at age 52 years or younger if you have an increased risk for breast cancer. Talk to your healthcare provider about when you should start having mammograms and how often you need them. Vaccines you may need:   Get an influenza vaccine  every year. The influenza vaccine protects you from the flu. Several types of viruses cause the flu. The viruses change over time, so new vaccines are made each year. Get a tetanus-diphtheria (Td) booster vaccine  every 10 years. This vaccine protects you against tetanus and diphtheria. Tetanus is a severe infection that may cause painful muscle spasms and lockjaw. Diphtheria is a severe bacterial infection that causes a thick covering in the back of your mouth and throat. Get a human papillomavirus (HPV) vaccine  if you are female and aged 23 to 32 or male 23 to 24 and never received it. This vaccine protects you from HPV infection. HPV is the most common infection spread by sexual contact. HPV may also cause vaginal, penile, and anal cancers. Get a pneumococcal vaccine  if you are aged 72 years or older.  The pneumococcal vaccine is an injection given to protect you from pneumococcal disease. Pneumococcal disease is an infection caused by pneumococcal bacteria. The infection may cause pneumonia, meningitis, or an ear infection. Get a shingles vaccine  if you are 60 or older, even if you have had shingles before. The shingles vaccine is an injection to protect you from the varicella-zoster virus. This is the same virus that causes chickenpox. Shingles is a painful rash that develops in people who had chickenpox or have been exposed to the virus. How to eat healthy:  My Plate is a model for planning healthy meals. It shows the types and amounts of foods that should go on your plate. Fruits and vegetables make up about half of your plate, and grains and protein make up the other half. A serving of dairy is included on the side of your plate. The amount of calories and serving sizes you need depends on your age, gender, weight, and height. Examples of healthy foods are listed below:  Eat a variety of vegetables  such as dark green, red, and orange vegetables. You can also include canned vegetables low in sodium (salt) and frozen vegetables without added butter or sauces. Eat a variety of fresh fruits , canned fruit in 100% juice, frozen fruit, and dried fruit. Include whole grains. At least half of the grains you eat should be whole grains. Examples include whole-wheat bread, wheat pasta, brown rice, and whole-grain cereals such as oatmeal.    Eat a variety of protein foods such as seafood (fish and shellfish), lean meat, and poultry without skin (turkey and chicken). Examples of lean meats include pork leg, shoulder, or tenderloin, and beef round, sirloin, tenderloin, and extra lean ground beef. Other protein foods include eggs and egg substitutes, beans, peas, soy products, nuts, and seeds. Choose low-fat dairy products such as skim or 1% milk or low-fat yogurt, cheese, and cottage cheese. Limit unhealthy fats  such as butter, hard margarine, and shortening. Exercise:  Exercise at least 30 minutes per day on most days of the week. Some examples of exercise include walking, biking, dancing, and swimming. You can also fit in more physical activity by taking the stairs instead of the elevator or parking farther away from stores. Include muscle strengthening activities 2 days each week. Regular exercise provides many health benefits. It helps you manage your weight, and decreases your risk for type 2 diabetes, heart disease, stroke, and high blood pressure. Exercise can also help improve your mood. Ask your healthcare provider about the best exercise plan for you. General health and safety guidelines:   Do not smoke. Nicotine and other chemicals in cigarettes and cigars can cause lung damage. Ask your healthcare provider for information if you currently smoke and need help to quit. E-cigarettes or smokeless tobacco still contain nicotine. Talk to your healthcare provider before you use these products. Limit alcohol. A drink of alcohol is 12 ounces of beer, 5 ounces of wine, or 1½ ounces of liquor. Lose weight, if needed. Being overweight increases your risk of certain health conditions. These include heart disease, high blood pressure, type 2 diabetes, and certain types of cancer. Protect your skin. Do not sunbathe or use tanning beds. Use sunscreen with a SPF 15 or higher. Apply sunscreen at least 15 minutes before you go outside. Reapply sunscreen every 2 hours. Wear protective clothing, hats, and sunglasses when you are outside. Drive safely. Always wear your seatbelt. Make sure everyone in your car wears a seatbelt. A seatbelt can save your life if you are in an accident. Do not use your cell phone when you are driving. This could distract you and cause an accident. Pull over if you need to make a call or send a text message. Practice safe sex. Use latex condoms if are sexually active and have more than one partner.  Your healthcare provider may recommend screening tests for sexually transmitted infections (STIs). Wear helmets, lifejackets, and protective gear. Always wear a helmet when you ride a bike or motorcycle, go skiing, or play sports that could cause a head injury. Wear protective equipment when you play sports. Wear a lifejacket when you are on a boat or doing water sports. © Copyright [facility] 2023 Information is for End User's use only and may not be sold, redistributed or otherwise used for commercial purposes. The above information is an  only. It is not intended as medical advice for individual conditions or treatments. Talk to your doctor, nurse or pharmacist before following any medical regimen to see if it is safe and effective for you. Kegel Exercises for Women   AMBULATORY CARE:   Kegel exercises  help strengthen your pelvic muscles. Pelvic muscles hold your pelvic organs, such as your bladder and uterus, in place. Kegel exercises help prevent or control certain conditions, such as urine incontinence (leakage) or uterine prolapse. Call your doctor or physical therapist if:   You cannot feel your muscles tighten or relax. You continue to leak urine. You have questions or concerns about your condition or care. Use the correct muscles:  Pelvic muscles are the muscles you use to control urine flow. To target these muscles, stop and start the flow of urine several times. This will help you become familiar with how it feels to tighten and relax these muscles. How to do Kegel exercises:   Get into a comfortable position. You may lie down, stand up, or sit down to do these exercises. When you first try to do these exercises, it may be easier if you lie down. Tighten or squeeze your pelvic muscles slowly. It may feel like you are trying to hold back urine or gas. Hold this position for 3 seconds. Relax for 3 seconds. Repeat this cycle 10 times.  Do not hold your breath when you do Kegel exercises. Keep your stomach, back, and leg muscles relaxed. Do 10 sets of Kegel exercises, at least 3 times a day. When you know how to do Kegel exercises, use different positions. This will help to strengthen your pelvic muscles as much as possible. You can do these exercises while you lie on the floor, watch TV, or while you stand. Tighten your pelvic muscles before you sneeze, cough, or lift to prevent urine leakage. You may notice improved bladder control within about 6 weeks. Follow up with your doctor or physical therapist as directed:  Write down your questions so you remember to ask them during your visits. © Copyright [facility] 2023 Information is for End User's use only and may not be sold, redistributed or otherwise used for commercial purposes. The above information is an  only. It is not intended as medical advice for individual conditions or treatments. Talk to your doctor, nurse or pharmacist before following any medical regimen to see if it is safe and effective for you. Perineal Hygiene      Your vaginal naturally takes care of its self, it is a self washing system, the less you mess the healthier it will be     No soaps or feminine wash to the vulva, these products can cause dermitis, bacterial infections and other vulvar problems. Use only water to cleanse, or water with D[employer] or [employer] if necessary. No scented lotions or products are advised in or near your vulva. Use only coconut oil for moisture if needed. No douching this may cause imbalance in your vaginal PH and further issues. If you wear panty liners, you may apply a thin coating of Vaseline, A&D ointment or coconut oil to the vulvar tissues as a skin barrier     [place] underware, loose fitting clothing  Only perfume-free, dye-free laundry detergent, use a second rinse cycle   Avoid fabric softeners/dryer sheets. Your partner should avoid the same products as well. Over the counter probiotic to restore vaginal aubrey may be helpful as well, take daily. You may also look into Boric Acid vaginal suppositories to restore vaginal PH balance for up to 2 weeks as directed on the box. You may not use these if you are pregnant      For vaginal dryness: You may use:     Coconut oil (organic, pure, unscented) as needed for moisture or lubrication. ( Do not use if allergic)       Replens moisture restore external comfort gel daily ( use as directed on the box)        Replens long lasting vaginal moisturizer  ( use as directed on the box)         For Vaginal Lubrication:          You may use:     Coconut oil (organic, pure, unscented) as a lubricant or another scent-free lubricant (Astroglide, Uberlube) if needed. Do not use coconut oil or silicone if using a condom as this may break down the integrity of the condom and cause an unplanned pregnancy              Do not use coconut oil if allergic               Replens silky smooth lubricant, premium silicone based lubricant for intercourse. ( use as directed, a small amount will provide an enhanced natural feeling)     Any premium over the counter vaginal lubricant water or silicone based. Silicone based will have more staying power.

## 2023-12-06 NOTE — PROGRESS NOTES
Diagnoses and all orders for this visit:    Encounter for gynecological examination without abnormal finding  -     Liquid-based pap, screening    Encounter for screening mammogram for malignant neoplasm of breast  -     Mammo screening bilateral w 3d & cad; Future    Encounter for Depo-Provera contraception  -     medroxyPROGESTERone acetate (DEPO-PROVERA SYRINGE) IM injection 150 mg    Dysmenorrhea  -     medroxyPROGESTERone (DEPO-PROVERA) 150 mg/mL injection; Inject 1 mL (150 mg total) into a muscle every 3 (three) months    Other orders  -     Discontinue: medroxyPROGESTERone acetate (DEPO-PROVERA SYRINGE) 150 mg/mL injection  -     Discontinue: medroxyPROGESTERone (DEPO-PROVERA) 150 mg/mL injection; Inject 150 mg into a muscle every 3 (three) months        Perineal hygiene reviewed   Weight bearing exercises minium of 150 mins/weekly advised. Kegel exercises recommended daily, see AVS for instructions and recommendations  SBE encouraged, ASCCP guidelines reviewed. Condoms encouraged with all sexual activity to prevent STI's. Gardisil vaccines recommended up to age 39  Calcium/ Vit D dietary requirements discussed,   Advised to call with any issues,  all concerns & questions addressed.    See provided information in your after visit summary     F/U Annually and PRN      Health Maintenance:    Last PAP: 12/06/2022  Negative HPV Negative, 7/13/20 ASCUS/ HPV 16+, Colpo with ANAIS 1, 7/19/21 Neg/Neg, 08/11/2021 Neg   Next PAP Due:collected today s/p colpo    Last Mammogram: 07/11/2023    Life time Silvia Helm % 16.62, Density D extremely dense , Bi-Rads 1 Negative  Next Mammogram: order given    Last Colonoscopy: 06/05/2023    RTO  in 5 years     Gardisil: Not completed       Subjective    CC: Yearly Exam      Momo Newton is a 57 y.o. female here for an annual exam. [id number]  GYN hx includes:  Depo, tubal, PARRIS 111, vulvectomy in 2019, abnormal paps ANAIS 1 ,   No personal Hx of breast, cervical, ovarian or colon CA. Family hx of: Paternal Aunt - breast cancer ( alive )   Medically stable, reports no changes in medical Hx, follows with PMD    No LMP recorded. Patient has had an injection. Her menstrual cycles are rare. She denies issues with bleeding during her menses. Very happy with Depo, she is more hungry   Reports history of abnormal pap smear. She denies breast concerns, abnormal vaginal discharge, vaginal itching, odor, irritation, bowel/bladder dysfunction, urinary symptoms, pelvic pain, or dyspareunia today. She is sexually active. Monogamous relationship. Her current method of contraception includes  Depo-provera inj. Denies any issues with her BCM. She does not want STD testing today. 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[address]     Past Medical History:   Diagnosis Date    Abnormal Pap smear of cervix     Disease of thyroid gland     as a teenager    Pneumonia     Shingles 08/2020    Varicella      Past Surgical History:   Procedure Laterality Date    AUGMENTATION BREAST  2014    AUGMENTATION MAMMAPLASTY Bilateral     FOREIGN BODY REMOVAL Left 11/30/2021    Procedure: Reggy Remigio;  Surgeon: Maria Elena Sawyer MD;  [facility];  Service: Gynecology    ND LAPAROSCOPY W/RMVL ADNEXAL STRUCTURES N/A 11/30/2021    Procedure: OPERATIVE LAPAROSCOPY BILATERAL SALPINGECTOMY;  Surgeon: Maria Elena Sawyer MD;  [facility];  Service: Gynecology    ND VULVECTOMY SIMPLE PARTIAL Bilateral 1/9/2020    Procedure: SIMPLE VULVECTOMY;  Surgeon: Nora Goldmann, MD;  [facility];  Service: Gynecology Oncology         There is no immunization history on file for this patient.     Family History   Problem Relation Age of Onset    Hypertension Mother     Hypertension Father     No Known Problems Daughter     No Known Problems Son     Breast cancer Paternal Aunt     Ovarian cancer Neg Hx     Colon cancer Neg Hx Social History     Tobacco Use    Smoking status: Every Day     Packs/day: 1.00     Years: 25.00     Total pack years: 25.00     Types: Cigarettes    Smokeless tobacco: Never    Tobacco comments:     15 cigs per day   Vaping Use    Vaping Use: Never used   Substance Use Topics    Alcohol use: Not Currently     Comment: occasionally    Drug use: Never       Current Outpatient Medications:     cetirizine (ZyrTEC) 10 mg tablet, 10 mg, Disp: , Rfl:     fluticasone (FLONASE) 50 mcg/act nasal spray, INSTILL 1 SPRAY IN EACH NOSTRIL ONCE DAILY, Disp: 16 mL, Rfl: 1    ketoconazole (NIZORAL) 2 % cream, Apply topically 2 (two) times a day, Disp: 60 g, Rfl: 0    medroxyPROGESTERone (DEPO-PROVERA) 150 mg/mL injection, Inject 1 mL (150 mg total) into a muscle every 3 (three) months, Disp: 1 mL, Rfl: 4    multivitamin (THERAGRAN) TABS, Take 1 tablet by mouth daily, Disp: , Rfl:     phentermine 37.5 MG capsule, Take 1 capsule (37.5 mg total) by mouth every morning, Disp: 30 capsule, Rfl: 0    Current Facility-Administered Medications:     medroxyPROGESTERone acetate (DEPO-PROVERA SYRINGE) IM injection 150 mg, 150 mg, Intramuscular, Q3 Months, [employer], CRNP, 150 mg at 23 1010  Patient Active Problem List    Diagnosis Date Noted    Left hip pain 2023    Overweight with body mass index (BMI) of 27 to 27.9 in adult 2023    Vitamin D insufficiency 2023    Status post bilateral salpingectomy 2021    Irregular menstrual bleeding 2021    Dysplasia of cervix, low grade (ANAIS 1) 2020    Herpes zoster without complication     Tobacco use 2020    PARRIS III (vulvar intraepithelial neoplasia III) 2019       No Known Allergies    OB History    Para Term  AB Living   2 2 2 0 0 2   SAB IAB Ectopic Multiple Live Births   0 0 0 0 2      # Outcome Date GA Lbr Luis E/2nd Weight Sex Delivery Anes PTL Lv   2 Term      Vag-Spont      1 Term      Vag-Spont Vitals:    12/08/23 0923   BP: 110/76   BP Location: Right arm   Patient Position: Sitting   Cuff Size: Large   Weight: 68.4 kg (150 lb 12.8 oz)   Height: 5' 2" (1.575 m)     Body mass index is 27.58 kg/m². Review of Systems     Constitutional: Negative for chills, fatigue, fever, headaches, visual disturbances, and unexpected weight change. Respiratory: Negative for cough, & shortness of breath. Cardiovascular: Negative for chest pain. .    Gastrointestinal: Negative for Abd pain, nausea & vomiting, constipation and diarrhea. Genitourinary: Negative for difficulty urinating, dysuria, hematuria, dyspareunia, unusual vaginal bleeding or discharge  Skin: Negative skin changes    Physical Exam     Constitutional: Alert & Oriented x3, well-developed and well-nourished. No distress. HENT: Atraumatic, Normocephalic, Conjunctivae clear  Neck: Normal range of motion. Neck supple. No thyromegaly, mass, nodules or tenderness  Pulmonary: Effort normal.   Abdominal: Soft. No tenderness or masses  Musculoskeletal: Normal ROM  Skin: Warm & Dry  Psychological: Normal mood, thought content, behavior & judgement     Breasts: bilateral breast augmentation   Right: tissue soft without masses, tenderness, skin changes or nipple discharge. No areas of erythema or pain. No subclavicular, axillary, pectoral adenopathy  Left:  tissue soft without masses, tenderness, skin changes or nipple discharge. No areas of erythema or pain. No subclavicular, axillary, pectoral adenopathy    Pelvic exam was performed with patient supine, lithotomy position. S/p bilateral vulvectomy, well healed, minimal scarring noted. Labia: Negative rash, tenderness, lesion or injury on the right labia. Negative rash, tenderness, lesion or injury on the left labia. Urethral meatus:  Negative for  tenderness, inflammation or discharge. Uterus: not deviated, enlarged, fixed or tender. Cervix: No CMT, no discharge or friability. Right adnexa: no mass, no tenderness and no fullness. Left adnexa: no mass, no tenderness and no fullness. Vagina: No erythema, tenderness, masses, or foreign body in the vagina. No signs of injury around the vagina. No unusual vaginal discharge   Perineum without lesions, signs of injury, erythema or swelling. Inguinal Canal:        Right: No inguinal adenopathy or hernia present. Left: No inguinal adenopathy or hernia present.      OBGyn Exam

## 2023-12-08 ENCOUNTER — ANNUAL EXAM (OUTPATIENT)
Dept: OBGYN CLINIC | Facility: CLINIC | Age: 48
End: 2023-12-08
Payer: COMMERCIAL

## 2023-12-08 VITALS
HEIGHT: 62 IN | SYSTOLIC BLOOD PRESSURE: 110 MMHG | BODY MASS INDEX: 27.75 KG/M2 | DIASTOLIC BLOOD PRESSURE: 76 MMHG | WEIGHT: 150.8 LBS

## 2023-12-08 DIAGNOSIS — Z30.42 ENCOUNTER FOR DEPO-PROVERA CONTRACEPTION: ICD-10-CM

## 2023-12-08 DIAGNOSIS — Z12.31 ENCOUNTER FOR SCREENING MAMMOGRAM FOR MALIGNANT NEOPLASM OF BREAST: ICD-10-CM

## 2023-12-08 DIAGNOSIS — N94.6 DYSMENORRHEA: ICD-10-CM

## 2023-12-08 DIAGNOSIS — Z01.419 ENCOUNTER FOR GYNECOLOGICAL EXAMINATION WITHOUT ABNORMAL FINDING: Primary | ICD-10-CM

## 2023-12-08 PROCEDURE — G0145 SCR C/V CYTO,THINLAYER,RESCR: HCPCS | Performed by: OBSTETRICS & GYNECOLOGY

## 2023-12-08 PROCEDURE — G0476 HPV COMBO ASSAY CA SCREEN: HCPCS | Performed by: OBSTETRICS & GYNECOLOGY

## 2023-12-08 PROCEDURE — 96372 THER/PROPH/DIAG INJ SC/IM: CPT | Performed by: OBSTETRICS & GYNECOLOGY

## 2023-12-08 PROCEDURE — 99396 PREV VISIT EST AGE 40-64: CPT | Performed by: OBSTETRICS & GYNECOLOGY

## 2023-12-08 RX ORDER — MEDROXYPROGESTERONE ACETATE 150 MG/ML
150 INJECTION, SUSPENSION INTRAMUSCULAR
COMMUNITY
End: 2023-12-08 | Stop reason: SDUPTHER

## 2023-12-08 RX ORDER — MEDROXYPROGESTERONE ACETATE 150 MG/ML
150 INJECTION, SUSPENSION INTRAMUSCULAR
Qty: 1 ML | Refills: 4 | Status: SHIPPED | OUTPATIENT
Start: 2023-12-08

## 2023-12-08 RX ORDER — MEDROXYPROGESTERONE ACETATE 150 MG/ML
150 INJECTION, SUSPENSION INTRAMUSCULAR
Status: SHIPPED | OUTPATIENT
Start: 2023-12-08

## 2023-12-08 RX ADMIN — MEDROXYPROGESTERONE ACETATE 150 MG: 150 INJECTION, SUSPENSION INTRAMUSCULAR at 10:10

## 2023-12-08 NOTE — PROGRESS NOTES
Pt is here for Depo Provera injection. Her last dose was 9/15/2023. Her annual exam was on 12/8/2023. Urine pregnancy test done: NO      Depo given in Right deltoid    Tolerated well. YES   Lot [id number] Exp 05/30/2025  Next dose due 2/23/2024*03/9/2024.    Refill needed NO

## 2023-12-15 LAB
LAB AP GYN PRIMARY INTERPRETATION: NORMAL
Lab: NORMAL

## 2024-01-08 DIAGNOSIS — E66.3 OVERWEIGHT WITH BODY MASS INDEX (BMI) OF 27 TO 27.9 IN ADULT: ICD-10-CM

## 2024-01-08 DIAGNOSIS — R63.5 UNINTENDED WEIGHT GAIN: ICD-10-CM

## 2024-01-09 RX ORDER — PHENTERMINE HYDROCHLORIDE 37.5 MG/1
37.5 CAPSULE ORAL EVERY MORNING
Qty: 30 CAPSULE | Refills: 0 | Status: SHIPPED | OUTPATIENT
Start: 2024-01-09

## 2024-01-22 ENCOUNTER — APPOINTMENT (OUTPATIENT)
Dept: LAB | Facility: CLINIC | Age: 49
End: 2024-01-22
Payer: COMMERCIAL

## 2024-01-22 DIAGNOSIS — E55.9 VITAMIN D INSUFFICIENCY: ICD-10-CM

## 2024-01-22 DIAGNOSIS — Z00.00 HEALTHCARE MAINTENANCE: ICD-10-CM

## 2024-01-22 LAB
25(OH)D3 SERPL-MCNC: 37.4 NG/ML (ref 30–100)
ALBUMIN SERPL BCP-MCNC: 4.3 G/DL (ref 3.5–5)
ALP SERPL-CCNC: 46 U/L (ref 34–104)
ALT SERPL W P-5'-P-CCNC: 14 U/L (ref 7–52)
ANION GAP SERPL CALCULATED.3IONS-SCNC: 5 MMOL/L
AST SERPL W P-5'-P-CCNC: 16 U/L (ref 13–39)
BASOPHILS # BLD AUTO: 0.07 THOUSANDS/ÂΜL (ref 0–0.1)
BASOPHILS NFR BLD AUTO: 1 % (ref 0–1)
BILIRUB SERPL-MCNC: 0.33 MG/DL (ref 0.2–1)
BUN SERPL-MCNC: 14 MG/DL (ref 5–25)
CALCIUM SERPL-MCNC: 9.4 MG/DL (ref 8.4–10.2)
CHLORIDE SERPL-SCNC: 107 MMOL/L (ref 96–108)
CHOLEST SERPL-MCNC: 193 MG/DL
CO2 SERPL-SCNC: 27 MMOL/L (ref 21–32)
CREAT SERPL-MCNC: 0.97 MG/DL (ref 0.6–1.3)
EOSINOPHIL # BLD AUTO: 0.1 THOUSAND/ÂΜL (ref 0–0.61)
EOSINOPHIL NFR BLD AUTO: 1 % (ref 0–6)
ERYTHROCYTE [DISTWIDTH] IN BLOOD BY AUTOMATED COUNT: 13 % (ref 11.6–15.1)
EST. AVERAGE GLUCOSE BLD GHB EST-MCNC: 123 MG/DL
GFR SERPL CREATININE-BSD FRML MDRD: 70 ML/MIN/1.73SQ M
GLUCOSE P FAST SERPL-MCNC: 94 MG/DL (ref 65–99)
HBA1C MFR BLD: 5.9 %
HCT VFR BLD AUTO: 49.2 % (ref 34.8–46.1)
HDLC SERPL-MCNC: 57 MG/DL
HGB BLD-MCNC: 15.4 G/DL (ref 11.5–15.4)
IMM GRANULOCYTES # BLD AUTO: 0.02 THOUSAND/UL (ref 0–0.2)
IMM GRANULOCYTES NFR BLD AUTO: 0 % (ref 0–2)
LDLC SERPL CALC-MCNC: 123 MG/DL (ref 0–100)
LYMPHOCYTES # BLD AUTO: 2.3 THOUSANDS/ÂΜL (ref 0.6–4.47)
LYMPHOCYTES NFR BLD AUTO: 26 % (ref 14–44)
MCH RBC QN AUTO: 29.4 PG (ref 26.8–34.3)
MCHC RBC AUTO-ENTMCNC: 31.3 G/DL (ref 31.4–37.4)
MCV RBC AUTO: 94 FL (ref 82–98)
MONOCYTES # BLD AUTO: 0.68 THOUSAND/ÂΜL (ref 0.17–1.22)
MONOCYTES NFR BLD AUTO: 8 % (ref 4–12)
NEUTROPHILS # BLD AUTO: 5.56 THOUSANDS/ÂΜL (ref 1.85–7.62)
NEUTS SEG NFR BLD AUTO: 64 % (ref 43–75)
NONHDLC SERPL-MCNC: 136 MG/DL
NRBC BLD AUTO-RTO: 0 /100 WBCS
PLATELET # BLD AUTO: 302 THOUSANDS/UL (ref 149–390)
PMV BLD AUTO: 10.8 FL (ref 8.9–12.7)
POTASSIUM SERPL-SCNC: 4.6 MMOL/L (ref 3.5–5.3)
PROT SERPL-MCNC: 7.2 G/DL (ref 6.4–8.4)
RBC # BLD AUTO: 5.24 MILLION/UL (ref 3.81–5.12)
SODIUM SERPL-SCNC: 139 MMOL/L (ref 135–147)
TRIGL SERPL-MCNC: 67 MG/DL
TSH SERPL DL<=0.05 MIU/L-ACNC: 1.22 UIU/ML (ref 0.45–4.5)
WBC # BLD AUTO: 8.73 THOUSAND/UL (ref 4.31–10.16)

## 2024-01-22 PROCEDURE — 85025 COMPLETE CBC W/AUTO DIFF WBC: CPT

## 2024-01-22 PROCEDURE — 80053 COMPREHEN METABOLIC PANEL: CPT

## 2024-01-22 PROCEDURE — 83036 HEMOGLOBIN GLYCOSYLATED A1C: CPT

## 2024-01-22 PROCEDURE — 82306 VITAMIN D 25 HYDROXY: CPT

## 2024-01-22 PROCEDURE — 36415 COLL VENOUS BLD VENIPUNCTURE: CPT

## 2024-01-22 PROCEDURE — 80061 LIPID PANEL: CPT

## 2024-01-22 PROCEDURE — 84443 ASSAY THYROID STIM HORMONE: CPT

## 2024-01-30 ENCOUNTER — OFFICE VISIT (OUTPATIENT)
Dept: FAMILY MEDICINE CLINIC | Facility: CLINIC | Age: 49
End: 2024-01-30
Payer: COMMERCIAL

## 2024-01-30 VITALS
WEIGHT: 152.4 LBS | TEMPERATURE: 98.8 F | BODY MASS INDEX: 28.05 KG/M2 | SYSTOLIC BLOOD PRESSURE: 108 MMHG | OXYGEN SATURATION: 98 % | HEIGHT: 62 IN | HEART RATE: 99 BPM | DIASTOLIC BLOOD PRESSURE: 72 MMHG

## 2024-01-30 DIAGNOSIS — E66.3 OVERWEIGHT WITH BODY MASS INDEX (BMI) OF 27 TO 27.9 IN ADULT: ICD-10-CM

## 2024-01-30 DIAGNOSIS — R73.03 PREDIABETES: ICD-10-CM

## 2024-01-30 DIAGNOSIS — R79.89 ABNORMAL CBC: ICD-10-CM

## 2024-01-30 DIAGNOSIS — Z00.00 ANNUAL PHYSICAL EXAM: Primary | ICD-10-CM

## 2024-01-30 PROCEDURE — 99396 PREV VISIT EST AGE 40-64: CPT | Performed by: NURSE PRACTITIONER

## 2024-01-30 NOTE — PROGRESS NOTES
ADULT ANNUAL PHYSICAL  [facility] [address] [facility]    NAME: Elena Blackwell  AGE: 48 y.o. SEX: female  : 1975     DATE: 2024     Assessment and Plan:     Problem List Items Addressed This Visit          Other    Overweight with body mass index (BMI) of 27 to 27.9 in adult     Patient gaining weight on current dose of phentermine.  Is also on the Provera injection.  Patient notes increased hunger and eating more.  At this time, we will discontinue use of phentermine due to ineffectiveness.  Patient does have follow-up appoint with OB/GYN in 2 months.  Advised to return in 3 to 4 months for reevaluation         Prediabetes     Recent blood work reviewed with patient.  Encouraged aerobic activity, avoiding excessive intake of added sugars.  Limiting portion sizes of refined carbohydrate foods such as white bread, white rice.  Incorporating fiber by eating a variety of fruits, vegetables, and whole grains.   Limiting saturated and trans fat by choosing lean protein and low-fat dairy.   To repeat labs in 3-4 months.         Relevant Orders    Hemoglobin A1C    Comprehensive metabolic panel     Other Visit Diagnoses       Annual physical exam    -  Primary    Abnormal CBC        Patient is asymptomatic.  Discussed decreasing tobacco use.  To repeat CBC in 4 weeks    Relevant Orders    CBC and differential              Immunizations and preventive care screenings were discussed with patient today. Appropriate education was printed on patient's after visit summary.    Counseling:  Alcohol/drug use: discussed moderation in alcohol intake, the recommendations for healthy alcohol use, and avoidance of illicit drug use.  Dental Health: discussed importance of regular tooth brushing, flossing, and dental visits.  Injury prevention: discussed safety/seat belts, safety helmets, smoke detectors, carbon dioxide detectors, and smoking near  bedding or upholstery.  Sexual health: discussed sexually transmitted diseases, partner selection, use of condoms, avoidance of unintended pregnancy, and contraceptive alternatives.  Exercise: the importance of regular exercise/physical activity was discussed. Recommend exercise 3-5 times per week for at least 30 minutes.       Depression Screening and Follow-up Plan: Patient was screened for depression during today's encounter. They screened negative with a PHQ-2 score of 0.    Tobacco Cessation Counseling: Tobacco cessation counseling was provided. The patient is sincerely urged to quit consumption of tobacco. She is not ready to quit tobacco. Medication options and side effects of medication discussed. Patient refused medication.         Return in about 4 months (around 5/30/2024) for Recheck.     Chief Complaint:     Chief Complaint   Patient presents with    Physical Exam      History of Present Illness:     Adult Annual Physical   Patient here for a comprehensive physical exam. The patient reports no problems.    Diet and Physical Activity  Diet/Nutrition: well balanced diet.   Exercise: walking and strength training exercises.      Depression Screening  PHQ-2/9 Depression Screening    Little interest or pleasure in doing things: 0 - not at all  Feeling down, depressed, or hopeless: 0 - not at all  PHQ-2 Score: 0  PHQ-2 Interpretation: Negative depression screen       General Health  Sleep: gets 7-8 hours of sleep on average.   Hearing: normal - bilateral.  Vision:  wears reading glasses .   Dental: regular dental visits, brushes teeth twice daily, and flosses teeth occasionally.       /GYN Health  Follows with gynecology? yes   Patient is: premenopausal  Last menstrual period: 12/26/2023  Contraceptive method: injectable contraception.    Advanced Care Planning  Do you have an advanced directive? no  Do you have a durable medical power of ? no     Review of Systems:     Review of Systems    Constitutional:  Negative for activity change, appetite change, chills, fatigue and unexpected weight change.   HENT:  Negative for congestion, ear pain and sore throat.    Eyes:  Negative for photophobia and visual disturbance.   Respiratory:  Negative for cough, chest tightness and shortness of breath.    Cardiovascular:  Negative for chest pain, palpitations and leg swelling.   Gastrointestinal:  Negative for abdominal pain, blood in stool, constipation, diarrhea, nausea and vomiting.   Endocrine: Negative for polydipsia, polyphagia and polyuria.   Genitourinary:  Negative for decreased urine volume, dysuria, flank pain, frequency, hematuria, pelvic pain and urgency.   Musculoskeletal:  Negative for arthralgias, back pain and myalgias.   Skin:  Negative for color change and rash.   Neurological:  Negative for dizziness, syncope, weakness, light-headedness, numbness and headaches.   Hematological:  Negative for adenopathy. Does not bruise/bleed easily.   Psychiatric/Behavioral:  Negative for confusion and sleep disturbance. The patient is not nervous/anxious.       Past Medical History:     Past Medical History:   Diagnosis Date    Abnormal Pap smear of cervix     Disease of thyroid gland     as a teenager    Pneumonia     Shingles 08/2020    Varicella       Past Surgical History:     Past Surgical History:   Procedure Laterality Date    AUGMENTATION BREAST  2014    AUGMENTATION MAMMAPLASTY Bilateral     FOREIGN BODY REMOVAL Left 11/30/2021    Procedure: NEXPLANON REMOVAL;  Surgeon: Dana Santos MD;  [facility];  Service: Gynecology    WI LAPAROSCOPY W/RMVL ADNEXAL STRUCTURES N/A 11/30/2021    Procedure: OPERATIVE LAPAROSCOPY BILATERAL SALPINGECTOMY;  Surgeon: Dana Santos MD;  [facility];  Service: Gynecology    WI VULVECTOMY SIMPLE PARTIAL Bilateral 1/9/2020    Procedure: SIMPLE VULVECTOMY;  Surgeon: Gadiel Christensen MD;  [facility];  Service: Gynecology  Oncology      Social History:     Social History     Socioeconomic History    Marital status: /Civil Union     Spouse name: None    Number of children: None    Years of education: None    Highest education level: None   Occupational History    None   Tobacco Use    Smoking status: Every Day     Current packs/day: 1.00     Average packs/day: 1 pack/day for 25.0 years (25.0 ttl pk-yrs)     Types: Cigarettes    Smokeless tobacco: Never    Tobacco comments:     15 cigs per day   Vaping Use    Vaping status: Never Used   Substance and Sexual Activity    Alcohol use: Not Currently     Comment: occasionally    Drug use: Never    Sexual activity: Yes     Partners: Male     Birth control/protection: None, Female Sterilization     Comment: tubal    Other Topics Concern    None   Social History Narrative    Works full-time        2 children     Social Determinants of Health     Financial Resource Strain: Not on file   Food Insecurity: Not on file   Transportation Needs: Not on file   Physical Activity: Not on file   Stress: Not on file   Social Connections: Not on file   Intimate Partner Violence: Not on file   Housing Stability: Not on file      Family History:     Family History   Problem Relation Age of Onset    Hypertension Mother     Hypertension Father     No Known Problems Daughter     No Known Problems Son     Breast cancer Paternal Aunt     Ovarian cancer Neg Hx     Colon cancer Neg Hx       Current Medications:     Current Outpatient Medications   Medication Sig Dispense Refill    cetirizine (ZyrTEC) 10 mg tablet 10 mg      fluticasone (FLONASE) 50 mcg/act nasal spray INSTILL 1 SPRAY IN EACH NOSTRIL ONCE DAILY 16 mL 1    ketoconazole (NIZORAL) 2 % cream Apply topically 2 (two) times a day 60 g 0    medroxyPROGESTERone (DEPO-PROVERA) 150 mg/mL injection Inject 1 mL (150 mg total) into a muscle every 3 (three) months 1 mL 4    multivitamin (THERAGRAN) TABS Take 1 tablet by mouth daily      phentermine  "37.5 MG capsule Take 1 capsule (37.5 mg total) by mouth every morning 30 capsule 0     Current Facility-Administered Medications   Medication Dose Route Frequency Provider Last Rate Last Admin    medroxyPROGESTERone acetate (DEPO-PROVERA SYRINGE) IM injection 150 mg  150 mg Intramuscular Q3 Months EFRAIN Amato   150 mg at 12/08/23 1010      Allergies:     No Known Allergies   Physical Exam:     /72 (BP Location: Left arm, Patient Position: Sitting, Cuff Size: Standard)   Pulse 99   Temp 98.8 °F (37.1 °C) (Tympanic)   Ht 5' 2\" (1.575 m)   Wt 69.1 kg (152 lb 6.4 oz)   SpO2 98%   BMI 27.87 kg/m²     Physical Exam  Vitals reviewed.   Constitutional:       General: She is not in acute distress.     Appearance: Normal appearance. She is well-developed. She is not ill-appearing.   HENT:      Head: Normocephalic and atraumatic.      Right Ear: Tympanic membrane, ear canal and external ear normal.      Left Ear: Tympanic membrane, ear canal and external ear normal.      Nose: Nose normal.      Mouth/Throat:      Mouth: Mucous membranes are moist.      Pharynx: Oropharynx is clear.   Eyes:      Extraocular Movements: Extraocular movements intact.      Conjunctiva/sclera: Conjunctivae normal.      Pupils: Pupils are equal, round, and reactive to light.   Cardiovascular:      Rate and Rhythm: Normal rate and regular rhythm.      Pulses: Normal pulses.      Heart sounds: Normal heart sounds. No murmur heard.  Pulmonary:      Effort: Pulmonary effort is normal. No respiratory distress.      Breath sounds: Normal breath sounds.   Abdominal:      General: Bowel sounds are normal.      Palpations: Abdomen is soft.      Tenderness: There is no abdominal tenderness. There is no right CVA tenderness or left CVA tenderness.   Musculoskeletal:         General: No swelling. Normal range of motion.      Cervical back: Normal range of motion and neck supple.      Right lower leg: No edema.      Left lower leg: " No edema.   Lymphadenopathy:      Cervical: No cervical adenopathy.   Skin:     General: Skin is warm and dry.      Capillary Refill: Capillary refill takes less than 2 seconds.   Neurological:      General: No focal deficit present.      Mental Status: She is alert and oriented to person, place, and time.   Psychiatric:         Mood and Affect: Mood normal.         Behavior: Behavior normal.          EFRAIN Tobar  [facility] [address] [facility]

## 2024-01-30 NOTE — ASSESSMENT & PLAN NOTE
Recent blood work reviewed with patient.  Encouraged aerobic activity, avoiding excessive intake of added sugars.  Limiting portion sizes of refined carbohydrate foods such as white bread, white rice.  Incorporating fiber by eating a variety of fruits, vegetables, and whole grains.   Limiting saturated and trans fat by choosing lean protein and low-fat dairy.   To repeat labs in 3-4 months.

## 2024-01-30 NOTE — ASSESSMENT & PLAN NOTE
Patient gaining weight on current dose of phentermine.  Is also on the Provera injection.  Patient notes increased hunger and eating more.  At this time, we will discontinue use of phentermine due to ineffectiveness.  Patient does have follow-up appoint with OB/GYN in 2 months.  Advised to return in 3 to 4 months for reevaluation   Pt aware of results and recommendations.

## 2024-02-22 ENCOUNTER — TELEPHONE (OUTPATIENT)
Age: 49
End: 2024-02-22

## 2024-02-22 NOTE — TELEPHONE ENCOUNTER
Saw her for annual 12/2023 was very happy with Depo, if she does not want to continue with Depo she does need OV

## 2024-02-22 NOTE — TELEPHONE ENCOUNTER
Patient is going to stick with depo for now and talk to Dr Kang about hysterectomy at her appt in April. Appointment was offered for 3/8 to discuss different form of birth control but patient was looking for more immediate solution since the April appt isn't too far away

## 2024-02-22 NOTE — TELEPHONE ENCOUNTER
Patient has a depo appointment 3/1, but has been bleeding early and complaining of cramping 5/6 for several weeks. Has been taking tylenol, but would consider another option.

## 2024-03-01 ENCOUNTER — OFFICE VISIT (OUTPATIENT)
Dept: OBGYN CLINIC | Facility: CLINIC | Age: 49
End: 2024-03-01
Payer: COMMERCIAL

## 2024-03-01 VITALS — DIASTOLIC BLOOD PRESSURE: 82 MMHG | SYSTOLIC BLOOD PRESSURE: 126 MMHG

## 2024-03-01 DIAGNOSIS — Z30.42 ENCOUNTER FOR DEPO-PROVERA CONTRACEPTION: Primary | ICD-10-CM

## 2024-03-01 PROCEDURE — 96372 THER/PROPH/DIAG INJ SC/IM: CPT | Performed by: OBSTETRICS & GYNECOLOGY

## 2024-03-01 RX ORDER — MEDROXYPROGESTERONE ACETATE 150 MG/ML
150 INJECTION, SUSPENSION INTRAMUSCULAR ONCE
Status: COMPLETED | OUTPATIENT
Start: 2024-03-01 | End: 2024-03-01

## 2024-03-01 RX ADMIN — MEDROXYPROGESTERONE ACETATE 150 MG: 150 INJECTION, SUSPENSION INTRAMUSCULAR at 14:02

## 2024-03-01 NOTE — PROGRESS NOTES
Pt is here for Depo Provera injection. Her last dose was 12/8/23.  Her annual exam was on 12/8/23.  Urine pregnancy test done: no   Result: n/a  Depo given in L deltoid  Tolerated well.  Lot [id number] Exp 09/30/2025  Next dose due 5/17-5/31.   Refill needed no, 1 left

## 2024-03-19 ENCOUNTER — APPOINTMENT (EMERGENCY)
Dept: CT IMAGING | Facility: HOSPITAL | Age: 49
End: 2024-03-19
Payer: COMMERCIAL

## 2024-03-19 ENCOUNTER — HOSPITAL ENCOUNTER (EMERGENCY)
Facility: HOSPITAL | Age: 49
Discharge: HOME | End: 2024-03-19
Attending: EMERGENCY MEDICINE
Payer: COMMERCIAL

## 2024-03-19 VITALS
DIASTOLIC BLOOD PRESSURE: 60 MMHG | OXYGEN SATURATION: 97 % | SYSTOLIC BLOOD PRESSURE: 110 MMHG | RESPIRATION RATE: 18 BRPM | TEMPERATURE: 98 F | HEART RATE: 65 BPM

## 2024-03-19 DIAGNOSIS — G89.29 CHRONIC PELVIC PAIN IN FEMALE: ICD-10-CM

## 2024-03-19 DIAGNOSIS — R10.2 CHRONIC PELVIC PAIN IN FEMALE: ICD-10-CM

## 2024-03-19 DIAGNOSIS — G89.29 ACUTE EXACERBATION OF CHRONIC LOW BACK PAIN: Primary | ICD-10-CM

## 2024-03-19 DIAGNOSIS — N80.03 ADENOMYOSIS OF UTERUS: ICD-10-CM

## 2024-03-19 DIAGNOSIS — M89.9 LESION OF BONE OF LUMBOSACRAL SPINE: ICD-10-CM

## 2024-03-19 DIAGNOSIS — M89.9 LESION OF PELVIC BONE: ICD-10-CM

## 2024-03-19 DIAGNOSIS — M54.50 ACUTE EXACERBATION OF CHRONIC LOW BACK PAIN: Primary | ICD-10-CM

## 2024-03-19 LAB
ALBUMIN SERPL BCP-MCNC: 4.5 G/DL (ref 3.5–5)
ALP SERPL-CCNC: 39 U/L (ref 34–104)
ALT SERPL W P-5'-P-CCNC: 17 U/L (ref 7–52)
ANION GAP SERPL CALCULATED.3IONS-SCNC: 7 MMOL/L (ref 4–13)
AST SERPL W P-5'-P-CCNC: 19 U/L (ref 13–39)
BASOPHILS # BLD AUTO: 0.05 THOUSANDS/ÂΜL (ref 0–0.1)
BASOPHILS NFR BLD AUTO: 1 % (ref 0–1)
BILIRUB DIRECT SERPL-MCNC: 0.05 MG/DL (ref 0–0.2)
BILIRUB SERPL-MCNC: 0.3 MG/DL (ref 0.2–1)
BILIRUB UR QL STRIP: NEGATIVE
BUN SERPL-MCNC: 17 MG/DL (ref 5–25)
CALCIUM SERPL-MCNC: 9.7 MG/DL (ref 8.4–10.2)
CHLORIDE SERPL-SCNC: 107 MMOL/L (ref 96–108)
CLARITY UR: CLEAR
CO2 SERPL-SCNC: 26 MMOL/L (ref 21–32)
COLOR UR: NORMAL
CREAT SERPL-MCNC: 0.91 MG/DL (ref 0.6–1.3)
EOSINOPHIL # BLD AUTO: 0.1 THOUSAND/ÂΜL (ref 0–0.61)
EOSINOPHIL NFR BLD AUTO: 1 % (ref 0–6)
ERYTHROCYTE [DISTWIDTH] IN BLOOD BY AUTOMATED COUNT: 13.3 % (ref 11.6–15.1)
EXT PREGNANCY TEST URINE: NEGATIVE
EXT. CONTROL: NORMAL
GFR SERPL CREATININE-BSD FRML MDRD: 75 ML/MIN/1.73SQ M
GLUCOSE SERPL-MCNC: 68 MG/DL (ref 65–140)
GLUCOSE UR STRIP-MCNC: NEGATIVE MG/DL
HCT VFR BLD AUTO: 43.3 % (ref 34.8–46.1)
HGB BLD-MCNC: 14.3 G/DL (ref 11.5–15.4)
HGB UR QL STRIP.AUTO: NEGATIVE
IMM GRANULOCYTES # BLD AUTO: 0.02 THOUSAND/UL (ref 0–0.2)
IMM GRANULOCYTES NFR BLD AUTO: 0 % (ref 0–2)
KETONES UR STRIP-MCNC: NEGATIVE MG/DL
LEUKOCYTE ESTERASE UR QL STRIP: NEGATIVE
LIPASE SERPL-CCNC: 46 U/L (ref 11–82)
LYMPHOCYTES # BLD AUTO: 2.91 THOUSANDS/ÂΜL (ref 0.6–4.47)
LYMPHOCYTES NFR BLD AUTO: 39 % (ref 14–44)
MCH RBC QN AUTO: 29.5 PG (ref 26.8–34.3)
MCHC RBC AUTO-ENTMCNC: 33 G/DL (ref 31.4–37.4)
MCV RBC AUTO: 89 FL (ref 82–98)
MONOCYTES # BLD AUTO: 0.62 THOUSAND/ÂΜL (ref 0.17–1.22)
MONOCYTES NFR BLD AUTO: 8 % (ref 4–12)
NEUTROPHILS # BLD AUTO: 3.74 THOUSANDS/ÂΜL (ref 1.85–7.62)
NEUTS SEG NFR BLD AUTO: 51 % (ref 43–75)
NITRITE UR QL STRIP: NEGATIVE
NRBC BLD AUTO-RTO: 0 /100 WBCS
PH UR STRIP.AUTO: 6 [PH]
PLATELET # BLD AUTO: 290 THOUSANDS/UL (ref 149–390)
PMV BLD AUTO: 10.3 FL (ref 8.9–12.7)
POTASSIUM SERPL-SCNC: 3.7 MMOL/L (ref 3.5–5.3)
PROT SERPL-MCNC: 7.4 G/DL (ref 6.4–8.4)
PROT UR STRIP-MCNC: NEGATIVE MG/DL
RBC # BLD AUTO: 4.85 MILLION/UL (ref 3.81–5.12)
S PYO DNA THROAT QL NAA+PROBE: NOT DETECTED
SODIUM SERPL-SCNC: 140 MMOL/L (ref 135–147)
SP GR UR STRIP.AUTO: 1.01 (ref 1–1.03)
UROBILINOGEN UR STRIP-ACNC: <2 MG/DL
WBC # BLD AUTO: 7.44 THOUSAND/UL (ref 4.31–10.16)

## 2024-03-19 PROCEDURE — 96361 HYDRATE IV INFUSION ADD-ON: CPT

## 2024-03-19 PROCEDURE — 36415 COLL VENOUS BLD VENIPUNCTURE: CPT | Performed by: EMERGENCY MEDICINE

## 2024-03-19 PROCEDURE — 81025 URINE PREGNANCY TEST: CPT | Performed by: EMERGENCY MEDICINE

## 2024-03-19 PROCEDURE — 80076 HEPATIC FUNCTION PANEL: CPT | Performed by: EMERGENCY MEDICINE

## 2024-03-19 PROCEDURE — 96365 THER/PROPH/DIAG IV INF INIT: CPT

## 2024-03-19 PROCEDURE — 99285 EMERGENCY DEPT VISIT HI MDM: CPT | Performed by: EMERGENCY MEDICINE

## 2024-03-19 PROCEDURE — 83690 ASSAY OF LIPASE: CPT | Performed by: EMERGENCY MEDICINE

## 2024-03-19 PROCEDURE — 74177 CT ABD & PELVIS W/CONTRAST: CPT

## 2024-03-19 PROCEDURE — 80048 BASIC METABOLIC PNL TOTAL CA: CPT | Performed by: EMERGENCY MEDICINE

## 2024-03-19 PROCEDURE — 87651 STREP A DNA AMP PROBE: CPT | Performed by: EMERGENCY MEDICINE

## 2024-03-19 PROCEDURE — 99284 EMERGENCY DEPT VISIT MOD MDM: CPT

## 2024-03-19 PROCEDURE — 81003 URINALYSIS AUTO W/O SCOPE: CPT | Performed by: EMERGENCY MEDICINE

## 2024-03-19 PROCEDURE — 96375 TX/PRO/DX INJ NEW DRUG ADDON: CPT

## 2024-03-19 PROCEDURE — 85025 COMPLETE CBC W/AUTO DIFF WBC: CPT | Performed by: EMERGENCY MEDICINE

## 2024-03-19 RX ORDER — ACETAMINOPHEN 10 MG/ML
1000 INJECTION, SOLUTION INTRAVENOUS ONCE
Status: COMPLETED | OUTPATIENT
Start: 2024-03-19 | End: 2024-03-19

## 2024-03-19 RX ORDER — CELECOXIB 200 MG/1
CAPSULE ORAL
Qty: 30 CAPSULE | Refills: 0 | Status: SHIPPED | OUTPATIENT
Start: 2024-03-19

## 2024-03-19 RX ORDER — KETOROLAC TROMETHAMINE 30 MG/ML
15 INJECTION, SOLUTION INTRAMUSCULAR; INTRAVENOUS ONCE
Status: COMPLETED | OUTPATIENT
Start: 2024-03-19 | End: 2024-03-19

## 2024-03-19 RX ORDER — DICYCLOMINE HCL 20 MG
20 TABLET ORAL ONCE
Status: COMPLETED | OUTPATIENT
Start: 2024-03-19 | End: 2024-03-19

## 2024-03-19 RX ADMIN — KETOROLAC TROMETHAMINE 15 MG: 30 INJECTION, SOLUTION INTRAMUSCULAR; INTRAVENOUS at 12:32

## 2024-03-19 RX ADMIN — SODIUM CHLORIDE 1000 ML: 0.9 INJECTION, SOLUTION INTRAVENOUS at 12:11

## 2024-03-19 RX ADMIN — ACETAMINOPHEN 1000 MG: 10 INJECTION INTRAVENOUS at 12:13

## 2024-03-19 RX ADMIN — DICYCLOMINE HYDROCHLORIDE 20 MG: 20 TABLET ORAL at 12:23

## 2024-03-19 RX ADMIN — IOHEXOL 100 ML: 350 INJECTION, SOLUTION INTRAVENOUS at 13:06

## 2024-03-19 NOTE — ED PROVIDER NOTES
"History  Chief Complaint   Patient presents with    Back Pain     Patient c/o lower back pain that started last year. Patient states\"she has been following up with OB/GYN to figure out where her back and lower abdominal pain is coming from.  Patient reports abdominal cramping on left side.     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  Patient states for the past year she has had cramping in her lower abdomen worse on the left side as well as pain in her low back mostly left-sided.  She states the pain in both her abdomen and back feels like a cramping and does seem to get worse just before and after her menstrual cycle.  She states she has been following with her PCP and OB/GYN in regards to this pain for the past year and at one point she was placed on Depo-Provera which did ease her symptoms for a few months but then the symptoms got worse again.  She states over the past 2 days, the pain has significantly intensified which brought her to the ED.  She localizes the pain to the left lower abdomen as well as the left flank region.  She states occasionally the pain does radiate down her left thigh anteriorly.  Denies any associated extremity weakness or paresthesia, extremity swelling, nausea, vomiting, diarrhea, constipation, blood per rectum or melena, dysuria, change in frequency, gross hematuria, vaginal discharge or foul odor, skin rash or color change, chest pain, palpitations, dyspnea, cough, hemoptysis, URI symptoms, headache, dizziness or near syncope, fevers or chills.  She has been taking Tylenol, last dose yesterday.  Patient had a normal colonoscopy other than internal hemorrhoids in March 2023.       History provided by:  Patient   used: No    Back Pain  Associated " symptoms: abdominal pain    Associated symptoms: no chest pain, no dysuria, no fever, no headaches, no numbness and no weakness        Prior to Admission Medications   Prescriptions Last Dose Informant Patient Reported? Taking?   cetirizine (ZyrTEC) 10 mg tablet  Self Yes No   Sig: 10 mg   fluticasone (FLONASE) 50 mcg/act nasal spray  Self No No   Sig: INSTILL 1 SPRAY IN EACH NOSTRIL ONCE DAILY   ketoconazole (NIZORAL) 2 % cream  Self No No   Sig: Apply topically 2 (two) times a day   medroxyPROGESTERone (DEPO-PROVERA) 150 mg/mL injection   No No   Sig: Inject 1 mL (150 mg total) into a muscle every 3 (three) months   multivitamin (THERAGRAN) TABS  Self Yes No   Sig: Take 1 tablet by mouth daily   phentermine 37.5 MG capsule   No No   Sig: Take 1 capsule (37.5 mg total) by mouth every morning      Facility-Administered Medications Last Administration Doses Remaining   medroxyPROGESTERone acetate (DEPO-PROVERA SYRINGE) IM injection 150 mg 12/8/2023 10:10 AM           Past Medical History:   Diagnosis Date    Abnormal Pap smear of cervix     Disease of thyroid gland     as a teenager    Pneumonia     Shingles 08/2020    Varicella        Past Surgical History:   Procedure Laterality Date    AUGMENTATION BREAST  2014    AUGMENTATION MAMMAPLASTY Bilateral     FOREIGN BODY REMOVAL Left 11/30/2021    Procedure: NEXPLANON REMOVAL;  Surgeon: Dana Santos MD;  Location: [facility];  Service: Gynecology    KS LAPAROSCOPY W/RMVL ADNEXAL STRUCTURES N/A 11/30/2021    Procedure: OPERATIVE LAPAROSCOPY BILATERAL SALPINGECTOMY;  Surgeon: Dana Santos MD;  Location: [facility];  Service: Gynecology    KS VULVECTOMY SIMPLE PARTIAL Bilateral 01/09/2020    Procedure: SIMPLE VULVECTOMY;  Surgeon: Gadiel Christensen MD;  [facility];  Service: Gynecology Oncology    TUBAL LIGATION         Family History   Problem Relation Age of Onset    Hypertension Mother     Hypertension Father     No Known Problems  Daughter     No Known Problems Son     Breast cancer Paternal Aunt     Ovarian cancer Neg Hx     Colon cancer Neg Hx      I have reviewed and agree with the history as documented.    E-Cigarette/Vaping    E-Cigarette Use Never User      E-Cigarette/Vaping Substances    Nicotine No     THC No     CBD No     Flavoring No     Other No     Unknown No      Social History     Tobacco Use    Smoking status: Every Day     Current packs/day: 1.00     Average packs/day: 1 pack/day for 25.0 years (25.0 ttl pk-yrs)     Types: Cigarettes    Smokeless tobacco: Never    Tobacco comments:     15 cigs per day   Vaping Use    Vaping status: Never Used   Substance Use Topics    Alcohol use: Not Currently     Comment: occasionally    Drug use: Never       Review of Systems   Constitutional:  Negative for chills and fever.   HENT:  Negative for congestion, ear pain, rhinorrhea and sore throat.    Respiratory:  Negative for cough, chest tightness, shortness of breath and wheezing.    Cardiovascular:  Negative for chest pain and palpitations.   Gastrointestinal:  Positive for abdominal pain. Negative for abdominal distention, blood in stool, constipation, diarrhea, nausea and vomiting.   Genitourinary:  Positive for flank pain. Negative for difficulty urinating, dysuria, frequency and hematuria.   Musculoskeletal:  Positive for back pain. Negative for neck pain and neck stiffness.   Skin:  Negative for color change, pallor, rash and wound.   Allergic/Immunologic: Negative for immunocompromised state.   Neurological:  Negative for dizziness, syncope, weakness, light-headedness, numbness and headaches.   Hematological:  Negative for adenopathy.   Psychiatric/Behavioral:  Negative for confusion and decreased concentration.    All other systems reviewed and are negative.      Physical Exam  Physical Exam  Vitals and nursing note reviewed.   Constitutional:       General: She is not in acute distress.     Appearance: Normal appearance. She is  well-developed. She is not ill-appearing, toxic-appearing or diaphoretic.   HENT:      Head: Normocephalic and atraumatic.      Right Ear: External ear normal.      Left Ear: External ear normal.      Mouth/Throat:      Mouth: Mucous membranes are moist.      Pharynx: Oropharynx is clear.   Eyes:      Extraocular Movements: Extraocular movements intact.      Conjunctiva/sclera: Conjunctivae normal.   Neck:      Vascular: No JVD.   Cardiovascular:      Rate and Rhythm: Normal rate and regular rhythm.      Pulses: Normal pulses.      Heart sounds: Normal heart sounds. No murmur heard.     No friction rub. No gallop.   Pulmonary:      Effort: Pulmonary effort is normal. No respiratory distress.      Breath sounds: Normal breath sounds. No wheezing, rhonchi or rales.   Abdominal:      General: Bowel sounds are normal. There is no distension.      Palpations: Abdomen is soft.      Tenderness: There is abdominal tenderness. There is left CVA tenderness. There is no right CVA tenderness, guarding or rebound.      Comments: +RLQ, LLQ and LUQ abdominal tenderness.    Musculoskeletal:         General: Tenderness present. No swelling. Normal range of motion.      Cervical back: Normal range of motion and neck supple. No rigidity.      Comments: No midline T/L spine tenderness. +Left lumbar paravertebral muscle tenderness.    Skin:     General: Skin is warm and dry.      Coloration: Skin is not pale.      Findings: No erythema or rash.   Neurological:      General: No focal deficit present.      Mental Status: She is alert and oriented to person, place, and time.      Sensory: No sensory deficit.      Motor: No weakness.      Comments: 5/5 strength in all 4 extremities.   Psychiatric:         Behavior: Behavior normal.         Vital Signs  ED Triage Vitals   Temperature Pulse Respirations Blood Pressure SpO2   03/19/24 1151 03/19/24 1152 03/19/24 1152 03/19/24 1152 03/19/24 1152   97.9 °F (36.6 °C) 98 18 146/78 100 %      Temp  Source Heart Rate Source Patient Position - Orthostatic VS BP Location FiO2 (%)   03/19/24 1151 03/19/24 1507 03/19/24 1507 03/19/24 1507 --   Oral Monitor Sitting Right arm       Pain Score       03/19/24 1152       9         Vitals:    03/19/24 1151 03/19/24 1152 03/19/24 1507   BP:  146/78 111/55   BP Location:   Right arm   Pulse:  98 68   Resp:  18 18   Temp: 97.9 °F (36.6 °C)     TempSrc: Oral     SpO2:  100% 97%          Visual Acuity  Visual Acuity      Flowsheet Row Most Recent Value   L Pupil Size (mm) 3   R Pupil Size (mm) 3            ED Medications  Medications   sodium chloride 0.9 % bolus 1,000 mL (0 mL Intravenous Stopped 3/19/24 1504)   ketorolac (TORADOL) injection 15 mg (15 mg Intravenous Given 3/19/24 1232)   acetaminophen (Ofirmev) injection 1,000 mg (0 mg Intravenous Stopped 3/19/24 1232)   dicyclomine (BENTYL) tablet 20 mg (20 mg Oral Given 3/19/24 1223)   iohexol (OMNIPAQUE) 350 MG/ML injection (MULTI-DOSE) 100 mL (100 mL Intravenous Given 3/19/24 1306)       Diagnostic Studies  Results Reviewed       Procedure Component Value Units Date/Time    Strep A PCR [[id number]]  (Normal) Collected: 03/19/24 1535    Lab Status: Final result Specimen: Throat Updated: 03/19/24 1607     STREP A PCR Not Detected    Basic metabolic panel [[id number]] Collected: 03/19/24 1215    Lab Status: Final result Specimen: Blood from Arm, Right Updated: 03/19/24 1249     Sodium 140 mmol/L      Potassium 3.7 mmol/L      Chloride 107 mmol/L      CO2 26 mmol/L      ANION GAP 7 mmol/L      BUN 17 mg/dL      Creatinine 0.91 mg/dL      Glucose 68 mg/dL      Calcium 9.7 mg/dL      eGFR 75 ml/min/1.73sq m     Narrative:      National Kidney Disease Foundation guidelines for Chronic Kidney Disease (CKD):     Stage 1 with normal or high GFR (GFR > 90 mL/min/1.73 square meters)    Stage 2 Mild CKD (GFR = 60-89 mL/min/1.73 square meters)    Stage 3A Moderate CKD (GFR = 45-59 mL/min/1.73 square meters)    Stage 3B Moderate CKD (GFR  = 30-44 mL/min/1.73 square meters)    Stage 4 Severe CKD (GFR = 15-29 mL/min/1.73 square meters)    Stage 5 End Stage CKD (GFR <15 mL/min/1.73 square meters)  Note: GFR calculation is accurate only with a steady state creatinine    Hepatic function panel [[id number]]  (Normal) Collected: 03/19/24 1215    Lab Status: Final result Specimen: Blood from Arm, Right Updated: 03/19/24 1249     Total Bilirubin 0.30 mg/dL      Bilirubin, Direct 0.05 mg/dL      Alkaline Phosphatase 39 U/L      AST 19 U/L      ALT 17 U/L      Total Protein 7.4 g/dL      Albumin 4.5 g/dL     Lipase [[id number]]  (Normal) Collected: 03/19/24 1215    Lab Status: Final result Specimen: Blood from Arm, Right Updated: 03/19/24 1249     Lipase 46 u/L     UA (URINE) with reflex to Scope [[id number]] Collected: 03/19/24 1218    Lab Status: Final result Specimen: Urine, Clean Catch Updated: 03/19/24 1227     Color, UA Light Yellow     Clarity, UA Clear     Specific Gravity, UA 1.013     pH, UA 6.0     Leukocytes, UA Negative     Nitrite, UA Negative     Protein, UA Negative mg/dl      Glucose, UA Negative mg/dl      Ketones, UA Negative mg/dl      Urobilinogen, UA <2.0 mg/dl      Bilirubin, UA Negative     Occult Blood, UA Negative    CBC and differential [[id number]] Collected: 03/19/24 1215    Lab Status: Final result Specimen: Blood from Arm, Right Updated: 03/19/24 1225     WBC 7.44 Thousand/uL      RBC 4.85 Million/uL      Hemoglobin 14.3 g/dL      Hematocrit 43.3 %      MCV 89 fL      MCH 29.5 pg      MCHC 33.0 g/dL      RDW 13.3 %      MPV 10.3 fL      Platelets 290 Thousands/uL      nRBC 0 /100 WBCs      Neutrophils Relative 51 %      Immature Grans % 0 %      Lymphocytes Relative 39 %      Monocytes Relative 8 %      Eosinophils Relative 1 %      Basophils Relative 1 %      Neutrophils Absolute 3.74 Thousands/µL      Absolute Immature Grans 0.02 Thousand/uL      Absolute Lymphocytes 2.91 Thousands/µL      Absolute Monocytes 0.62 Thousand/µL       Eosinophils Absolute 0.10 Thousand/µL      Basophils Absolute 0.05 Thousands/µL     POCT pregnancy, urine [[id number]]  (Normal) Resulted: 03/19/24 1219    Lab Status: Final result Updated: 03/19/24 1219     EXT Preg Test, Ur Negative     Control Valid                   CT abdomen pelvis with contrast   Final Result by Haseeb Hooper DO (03/19 1456)      1. No CT evidence of acute inflammatory process within the abdomen or pelvis.      2. Moderate colonic stool burden suggestive of constipation.      3. Uterus is prominent with heterogeneous myometrial attenuation. This finding is nonspecific but may be attributable to underlying adenomyosis or fibroid changes. Findings suggestive of adenomyosis were noted on prior ultrasound of August 1, 2023.      4. Numerous sclerotic foci within the bony pelvis and spine measuring up to 1.2 cm. These findings are nonspecific but likely represent bone islands in the absence of significant cancer history.         Workstation performed: [id number]         CT recon only lumbar spine   Final Result by Osbaldo Barton DO (03/19 1517)      No fracture or traumatic subluxation.      Mild multilevel degenerative disc disease of the lumbar spine as described above, most prominent at levels L4-L5 and L5-S1.            Resident: CASS Zaman I, the attending radiologist, have reviewed the images and agree with the final report above.      Workstation performed: [id number]                    Procedures  Procedures         ED Course  ED Course as of 03/19/24 1614   Tue Mar 19, 2024   1247 Leukocytes, UA: Negative   1247 Nitrite, UA: Negative   1247 Blood, UA: Negative   1247 WBC: 7.44   1247 Hemoglobin: 14.3   1247 Platelet Count: 290   1514 After reviewing CT abd/pelvis report, tiger texted patient's PCP, Maisha Devries about significant CT findings of multiple bone lesions to ensure she has close outpatient follow-up. Message was sent but not yet read. Will provide copy of CT  report upon discharge to take to her OB/GYN and PCP.    1528 Updated patient about CT findings of heterogeneous uterus which could represent adenomyosis and recommended close follow-up with OB/GYN.  Discussed this could be a source of her chronic cyclical pelvic and back pain.  I also discussed multiple bone lesions for which bone islands are likely suspected however I recommended close follow-up with PCP to have this monitored and potentially have further imaging given her history of vulvar cancer.  Discussed stool burden seen on CT scan which could indicate constipation but patient denies feeling constipated and states she has daily bowel movements without significant straining.  In general, patient's pain has improved and she rates it a 4/10 in severity compared to 8/10.  Plan is to prescribe Celebrex for her to take as needed for mild to moderate pain and to add on Tylenol and over-the-counter lidocaine patches.  Patient's daughter requested patient be tested for strep as it has been going around in the household and patient does report mild sore throat.  On examination, posterior oropharynx is erythematous but no obvious tonsillar hypertrophy or exudate.  Will add on strep swab prior to discharge.   1609 STREP A PCR: Not Detected   1609 Updated patient of negative strep swab. ED return parameters discussed.  Patient reports she just made an appointment with her OB/GYN scheduled for tomorrow at 2:30PM. Also, Maisha Devries, patient's PCP responded to text and will review chart and ensure follow up.                               SBIRT 20yo+      Flowsheet Row Most Recent Value   Initial Alcohol Screen: US AUDIT-C     1. How often do you have a drink containing alcohol? 0 Filed at: 03/19/2024 1152   2. How many drinks containing alcohol do you have on a typical day you are drinking?  0 Filed at: 03/19/2024 1152   3a. Male UNDER 65: How often do you have five or more drinks on one occasion? 0 Filed at: 03/19/2024  1152   3b. FEMALE Any Age, or MALE 65+: How often do you have 4 or more drinks on one occassion? 0 Filed at: 03/19/2024 1152   Audit-C Score 0 Filed at: 03/19/2024 1152   VJ: How many times in the past year have you...    Used an illegal drug or used a prescription medication for non-medical reasons? Never Filed at: 03/19/2024 1152                      Medical Decision Making  48-year-old female presents to the ED with acute on chronic lower abdominal pain as well as left low back pain.  Patient has had symptoms on and off for 1 year but it got acutely worse over the past 2 days.  Differential is vast and includes hormone related changes given that it gets worse during her menstrual cycle and did initially get improvement when starting birth control.  Other considerations include UTI or pyelonephritis, renal colic secondary to stone, nonspecific enteritis or colitis, diverticulitis, malignancy.  Will evaluate with abdominal labs, UA, pregnancy test and CT scan of the abdomen and pelvis with lumbar spine recon imaging.  Will provide IV fluids, Toradol, Tylenol, Bentyl for symptom relief.      Amount and/or Complexity of Data Reviewed  Labs: ordered. Decision-making details documented in ED Course.  Radiology: ordered. Decision-making details documented in ED Course.    Risk  Prescription drug management.             Disposition  Final diagnoses:   Acute exacerbation of chronic low back pain   Chronic pelvic pain in female   Lesion of bone of lumbosacral spine - Multiple sclerotic bone lesions in pelvis and spine, likely bone islands; Follow up with PCP   Lesion of pelvic bone   Adenomyosis of uterus - Suspected based on CT findings and prior ultrasound; Follow up with Ob/Gyn     Time reflects when diagnosis was documented in both MDM as applicable and the Disposition within this note       Time User Action Codes Description Comment    3/19/2024  3:30 PM Shira Carranza Add [M54.50,  G89.29] Acute exacerbation of  chronic low back pain     3/19/2024  3:30 PM Shira Carranza Add [R10.2,  G89.29] Chronic pelvic pain in female     3/19/2024  3:31 PM AumanjulaeroShira E Add [M89.9] Lesion of bone of lumbosacral spine     3/19/2024  3:31 PM AumanjulaeroShira E Add [M89.9] Lesion of pelvic bone     3/19/2024  3:32 PM DestinieroShira E Add [N80.03] Adenomyosis of uterus     3/19/2024  3:32 PM AumanjulaeroShira E Modify [N80.03] Adenomyosis of uterus Suspected based on CT findings and prior ultrasound    3/19/2024  3:33 PM AumanjulaeroShira Modify [M89.9] Lesion of bone of lumbosacral spine Multiple sclerotic bone lesions in pelvis and spine, likely bone islands; Follow up with PCP    3/19/2024  3:33 PM Shira Carranza Modify [N80.03] Adenomyosis of uterus Suspected based on CT findings and prior ultrasound; Follow up with Ob/Gyn          ED Disposition       ED Disposition   Discharge    Condition   Stable    Date/Time   Tue Mar 19, 2024 1534    Comment   Elena Blackwell discharge to home/self care.                   Follow-up Information       Follow up With Specialties Details Why Contact Info Additional Information    [facility] Schedule an appointment as soon as possible for a visit   [address] [facility], [place] [address] [phone]       Ob/Gyn  Schedule an appointment as soon as possible for a visit        [facility] Emergency Medicine Go to  If symptoms worsen [address] [facility] [id number] [phone] [facility], [address] [place], [place], [address]            Patient's Medications   Discharge Prescriptions    CELECOXIB (CELEBREX) 200 MG CAPSULE    Take 1 tablet every 12-24 hours as needed for moderate pain.       Start Date: 3/19/2024 End Date: --       Order Dose: --       Quantity: 30 capsule    Refills: 0       No discharge procedures on file.    PDMP Review         Value  Time User    PDMP Reviewed  Yes 1/9/2024 10:05 AM [facility]            ED Provider  Electronically Signed by             Shira Carranza DO  03/19/24 2391

## 2024-03-20 ENCOUNTER — OFFICE VISIT (OUTPATIENT)
Dept: OBGYN CLINIC | Facility: CLINIC | Age: 49
End: 2024-03-20
Payer: COMMERCIAL

## 2024-03-20 VITALS — DIASTOLIC BLOOD PRESSURE: 76 MMHG | WEIGHT: 158 LBS | BODY MASS INDEX: 28.9 KG/M2 | SYSTOLIC BLOOD PRESSURE: 114 MMHG

## 2024-03-20 DIAGNOSIS — Z30.42 ENCOUNTER FOR DEPO-PROVERA CONTRACEPTION: ICD-10-CM

## 2024-03-20 DIAGNOSIS — N87.0 DYSPLASIA OF CERVIX, LOW GRADE (CIN 1): Primary | ICD-10-CM

## 2024-03-20 DIAGNOSIS — N92.6 IRREGULAR MENSTRUAL BLEEDING: ICD-10-CM

## 2024-03-20 DIAGNOSIS — N94.6 DYSMENORRHEA: ICD-10-CM

## 2024-03-20 PROCEDURE — 99214 OFFICE O/P EST MOD 30 MIN: CPT | Performed by: STUDENT IN AN ORGANIZED HEALTH CARE EDUCATION/TRAINING PROGRAM

## 2024-03-20 RX ORDER — MEDROXYPROGESTERONE ACETATE 150 MG/ML
INJECTION, SUSPENSION INTRAMUSCULAR
Qty: 1 ML | Refills: 4 | Status: SHIPPED | OUTPATIENT
Start: 2024-03-20

## 2024-03-20 RX ORDER — MEDROXYPROGESTERONE ACETATE 150 MG/ML
150 INJECTION, SUSPENSION INTRAMUSCULAR
Status: DISCONTINUED | OUTPATIENT
Start: 2024-06-05 | End: 2024-03-20

## 2024-04-05 ENCOUNTER — TELEPHONE (OUTPATIENT)
Dept: FAMILY MEDICINE CLINIC | Facility: CLINIC | Age: 49
End: 2024-04-05

## 2024-04-22 ENCOUNTER — CONSULT (OUTPATIENT)
Dept: OBGYN CLINIC | Facility: CLINIC | Age: 49
End: 2024-04-22
Payer: COMMERCIAL

## 2024-04-22 VITALS — WEIGHT: 162 LBS | BODY MASS INDEX: 29.63 KG/M2 | DIASTOLIC BLOOD PRESSURE: 80 MMHG | SYSTOLIC BLOOD PRESSURE: 120 MMHG

## 2024-04-22 DIAGNOSIS — N93.9 ABNORMAL UTERINE BLEEDING (AUB): ICD-10-CM

## 2024-04-22 DIAGNOSIS — Z72.0 TOBACCO USE: ICD-10-CM

## 2024-04-22 DIAGNOSIS — N94.6 DYSMENORRHEA: Primary | ICD-10-CM

## 2024-04-22 PROCEDURE — 88305 TISSUE EXAM BY PATHOLOGIST: CPT | Performed by: PATHOLOGY

## 2024-04-22 PROCEDURE — 58100 BIOPSY OF UTERUS LINING: CPT | Performed by: STUDENT IN AN ORGANIZED HEALTH CARE EDUCATION/TRAINING PROGRAM

## 2024-04-22 PROCEDURE — 99214 OFFICE O/P EST MOD 30 MIN: CPT | Performed by: STUDENT IN AN ORGANIZED HEALTH CARE EDUCATION/TRAINING PROGRAM

## 2024-04-22 NOTE — H&P (VIEW-ONLY)
Assessment/Plan:     Problem List Items Addressed This Visit          Behavioral Health    Tobacco use     Other Visit Diagnoses       Dysmenorrhea    -  Primary    Relevant Orders    Tissue Exam    Abnormal uterine bleeding (AUB)        Relevant Orders    Endometrial biopsy          - discussed need for EMB in setting of AUB to rule out endometrial hyperplasia. She agrees and this was performed today  -Desires definitive management with total hysterectomy: we discussed risks, benefits, alternatives. We discussed risks including bleeding, infection, damage to surrounding organs (bowel, bladder), nerves and vessels, DVT/PE, very unlikely risk of death. Discussed minimal risks with blood transfusion to include transfusion reaction, TRALI, rare transmission of HIV/Hepatitis C. She is interested in bilateral oophorectomy for prevention of ovarian cancer. Benefits include removal cervical dysplasia, decreased risk ovarian cancer. Patient wishes to proceed.   -Will plan to proceed with EUA, TLH, bilateral oophorectomy, cystoscopy. Consent form reviewed in detail with patient, signed by patient      Subjective:      Patient ID: Elena Blackwell is a 48 y.o. female.    HPI  She presents today for preoperative visit for hysterectomy for longterm abnormal uterine bleeding and dysmenorrhea, previously controlled with Depo Provera. She continues to have painful and irregular bleeding, is still interested in hysterectomy.     The following portions of the patient's history were reviewed and updated as appropriate: allergies, current medications, past family history, past medical history, past social history, past surgical history, and problem list.    Review of Systems  as above    Objective:  /80 (BP Location: Left arm, Patient Position: Sitting, Cuff Size: Standard)   Wt 73.5 kg (162 lb)   BMI 29.63 kg/m²      Physical Exam  Vitals reviewed.   Constitutional:       Appearance: She is well-developed.   HENT:       Head: Normocephalic.   Cardiovascular:      Rate and Rhythm: Normal rate.   Pulmonary:      Effort: Pulmonary effort is normal.   Abdominal:      General: There is no distension.      Palpations: Abdomen is soft.      Tenderness: There is no abdominal tenderness. There is no guarding or rebound.   Genitourinary:     General: Normal vulva.      Exam position: Lithotomy position.      Vagina: No bleeding.      Cervix: No cervical motion tenderness.   Musculoskeletal:         General: Normal range of motion.      Cervical back: Normal range of motion.   Skin:     General: Skin is warm and dry.   Neurological:      Mental Status: She is alert and oriented to person, place, and time.   Psychiatric:         Behavior: Behavior normal.

## 2024-04-22 NOTE — PROGRESS NOTES
"Endometrial biopsy    Date/Time: 4/22/2024 10:00 AM    Performed by: Sujata Kang MD  Authorized by: Sujata Kang MD  Universal Protocol:  Consent: Verbal consent obtained. Written consent not obtained.  Risks and benefits: risks, benefits and alternatives were discussed  Consent given by: patient  Time out: Immediately prior to procedure a \"time out\" was called to verify the correct patient, procedure, equipment, support staff and site/side marked as required.  Patient understanding: patient states understanding of the procedure being performed  Patient consent: the patient's understanding of the procedure matches consent given  Procedure consent: procedure consent matches procedure scheduled  Test results: test results available and properly labeled  Site marked: the operative site was not marked  Patient identity confirmed: verbally with patient    Indication:     Indications: Other disorder of menstruation and other abnormal bleeding from female genital tract    Procedure:     Procedure: endometrial biopsy with Pipelle      A bivalve speculum was placed in the vagina: yes      Cervix cleaned and prepped: yes      The cervix was dilated: no      Uterus sounded: yes      Uterus sound depth (cm):  9    Specimen collected: specimen collected and sent to pathology      Patient tolerated procedure well with no complications: yes    Findings:     Cervix: normal        "

## 2024-04-23 ENCOUNTER — TELEPHONE (OUTPATIENT)
Dept: OBGYN CLINIC | Facility: CLINIC | Age: 49
End: 2024-04-23

## 2024-04-23 NOTE — TELEPHONE ENCOUNTER
----- Message from Sujata Kang MD sent at 2024 11:23 AM EDT -----  [facility] GYN Department  Surgery Scheduling Sheet    Patient Name: Elena Blackwell  : 1975    Provider: Sujata Kang MD     Needed: no; Language: N/A    Procedure: exam under anesthesia, total laparoscopic hysterectomy, and bilateral oopherectomy    Diagnosis: dysmenorrhea, AUB    Special Needs or Equipment: none    Anesthesia: General anesthesia    Length of stay: outpatient  Does patient have comorbid conditions that will require close perioperative monitoring prior to safe discharge: no    The patient has comorbid conditions that will require close perioperative monitoring prior to safe discharge, including N/A.   This may require acute care beyond the usual and routine recovery period. As such, inpatient admission post-operatively is expected and appropriate, and anticipated hospital length of stay will be >2 midnights.    Pre-Admission Testing Needed: yes   Labs that should be ordered: recommended labs    Order PAT that is recommended in prep for procedure?: Yes    Medical Clearance Needed: no; Provider: N/A    MA Form Signed (tubals/hysterectomy): Not Indicated    Surgical Drink Given: yes     How many days out of work: 6 week(s)     How many days no drivin day(s)       Is pre op appt needed?  no  Interval for post op appt: 2 week(s)     For Surgical Scheduler:     Surgery Scheduled On:  [place]: [facility]    Pre-op Appt:   Post op Appt:  Consult/Medical clearance appt:

## 2024-04-23 NOTE — TELEPHONE ENCOUNTER
Referral order entered Talked to patient she is scheduled for her surgical procedure on 5/8/2024 with Dr. Kang at the [facility]. Patient aware of lab work need prior to her surgical procedure.

## 2024-04-24 PROCEDURE — 88305 TISSUE EXAM BY PATHOLOGIST: CPT | Performed by: PATHOLOGY

## 2024-04-30 ENCOUNTER — OFFICE VISIT (OUTPATIENT)
Dept: FAMILY MEDICINE CLINIC | Facility: CLINIC | Age: 49
End: 2024-04-30
Payer: COMMERCIAL

## 2024-04-30 VITALS
BODY MASS INDEX: 29.33 KG/M2 | HEIGHT: 62 IN | SYSTOLIC BLOOD PRESSURE: 120 MMHG | RESPIRATION RATE: 16 BRPM | HEART RATE: 76 BPM | DIASTOLIC BLOOD PRESSURE: 80 MMHG | WEIGHT: 159.4 LBS | OXYGEN SATURATION: 96 %

## 2024-04-30 DIAGNOSIS — Z09 HOSPITAL DISCHARGE FOLLOW-UP: ICD-10-CM

## 2024-04-30 DIAGNOSIS — N80.03 ADENOMYOSIS OF UTERUS: ICD-10-CM

## 2024-04-30 DIAGNOSIS — M89.9 LESION OF BONE OF THORACIC SPINE: ICD-10-CM

## 2024-04-30 DIAGNOSIS — M89.9 LESION OF LUMBAR SPINE: ICD-10-CM

## 2024-04-30 DIAGNOSIS — M89.9 LESION OF PELVIC BONE: Primary | ICD-10-CM

## 2024-04-30 PROCEDURE — 99214 OFFICE O/P EST MOD 30 MIN: CPT | Performed by: NURSE PRACTITIONER

## 2024-04-30 NOTE — PROGRESS NOTES
"Name: Elena Blackwell      : 1975      MRN: [id number]  Encounter Provider: EFRAIN Tobar  Encounter Date: 2024   Encounter department: [facility] [address]    Assessment & Plan     1. Lesion of pelvic bone  Comments:  Patient denies severe pain, unexplained weight loss, fevers, recent fractures, inability to bear weight on lower extremities.  Advised to obtain MRI as ordered.  Orders:  -     MRI pelvis bony wo and w contrast; Future; Expected date: 2024    2. Lesion of lumbar spine  Comments:  Patient denies severe pain, unexplained weight loss, fevers, recent fractures, inability to bear weight on lower extremities. Advised to obtain MRI as ordered.  Orders:  -     MRI lumbar spine w wo contrast; Future; Expected date: 2024    3. Lesion of bone of thoracic spine  Comments:  Patient denies severe pain, unexplained weight loss, fevers, recent fractures, inability to bear weight on lower extremities. Advised to obtain MRI as ordered.  Orders:  -     MRI thoracic spine w wo contrast; Future; Expected date: 2024    4. Adenomyosis of uterus  Comments:  Patient scheduled for laproscopic hysterectomy . To continue follow-up with GYN as scheduled.    5. Hospital discharge follow-up  Comments:  Recent ED visit results reviewed with patient.  To otbain MRIs as ordered.           Subjective      Patient presents office for evaluation after ED visit.  Patient was seen in ED on 3/19 for increasing low back pain and pelvic pain.   Patient with history of adenomyosis.  Has been experiencing abnormal bleeding.    Is scheduled for laparoscopic hysterectomy on .  CT scan of abdomen pelvis as well as CT scan of lumbar spine completed at visit.  Ct scan found \"Numerous sclerotic foci within the bony pelvis and spine measuring up to 1.2 cm.\"  Patient notes low back pain that has been waxing and waning for years.  Denies fever, chills, " "unexplained weight loss, inability to bear weight on lower extremities, numbness/tingling of lower extremities.      Review of Systems   Constitutional:  Negative for activity change, appetite change, diaphoresis, fever and unexpected weight change.   HENT:  Negative for sore throat and trouble swallowing.    Eyes:  Negative for photophobia and visual disturbance.   Respiratory:  Negative for cough and shortness of breath.    Cardiovascular:  Negative for chest pain and palpitations.   Gastrointestinal:  Negative for abdominal pain, blood in stool and constipation.   Genitourinary:  Negative for decreased urine volume, dysuria, flank pain, frequency, hematuria and urgency.   Musculoskeletal:  Positive for back pain. Negative for gait problem and myalgias.   Skin:  Negative for color change and rash.   Neurological:  Negative for dizziness, weakness, light-headedness, numbness and headaches.   Hematological:  Negative for adenopathy. Does not bruise/bleed easily.   Psychiatric/Behavioral:  Negative for confusion.        Current Outpatient Medications on File Prior to Visit   Medication Sig    cetirizine (ZyrTEC) 10 mg tablet 10 mg    fluticasone (FLONASE) 50 mcg/act nasal spray INSTILL 1 SPRAY IN EACH NOSTRIL ONCE DAILY    ketoconazole (NIZORAL) 2 % cream Apply topically 2 (two) times a day    multivitamin (THERAGRAN) TABS Take 1 tablet by mouth daily    [DISCONTINUED] celecoxib (CeleBREX) 200 mg capsule Take 1 tablet every 12-24 hours as needed for moderate pain. (Patient not taking: Reported on 4/30/2024)    [DISCONTINUED] medroxyPROGESTERone (DEPO-PROVERA) 150 mg/mL injection 150 mg IM q 10 weeks (Patient not taking: Reported on 4/30/2024)    [DISCONTINUED] phentermine 37.5 MG capsule Take 1 capsule (37.5 mg total) by mouth every morning (Patient not taking: Reported on 4/30/2024)       Objective     /80   Pulse 76   Resp 16   Ht 5' 2\" (1.575 m)   Wt 72.3 kg (159 lb 6.4 oz)   SpO2 96%   BMI 29.15 kg/m² "     Physical Exam  Vitals reviewed.   Constitutional:       General: She is not in acute distress.     Appearance: Normal appearance. She is not ill-appearing.   HENT:      Head: Normocephalic and atraumatic.      Right Ear: Tympanic membrane, ear canal and external ear normal.      Left Ear: Tympanic membrane, ear canal and external ear normal.      Nose: Nose normal.      Mouth/Throat:      Mouth: Mucous membranes are moist.      Pharynx: Oropharynx is clear.   Eyes:      Conjunctiva/sclera: Conjunctivae normal.      Pupils: Pupils are equal, round, and reactive to light.   Cardiovascular:      Rate and Rhythm: Normal rate and regular rhythm.      Pulses: Normal pulses.      Heart sounds: Normal heart sounds.   Pulmonary:      Effort: Pulmonary effort is normal.      Breath sounds: Normal breath sounds.   Abdominal:      General: Bowel sounds are normal. There is no distension.      Palpations: Abdomen is soft.      Tenderness: There is no abdominal tenderness.   Musculoskeletal:         General: Normal range of motion.      Cervical back: No tenderness.      Thoracic back: No bony tenderness.      Lumbar back: No bony tenderness.      Right lower leg: No edema.      Left lower leg: No edema.   Skin:     General: Skin is warm and dry.   Neurological:      General: No focal deficit present.      Mental Status: She is alert and oriented to person, place, and time.   Psychiatric:         Mood and Affect: Mood normal.         Behavior: Behavior normal.       EFRAIN Tobar

## 2024-05-03 ENCOUNTER — APPOINTMENT (OUTPATIENT)
Dept: LAB | Facility: HOSPITAL | Age: 49
End: 2024-05-03

## 2024-05-03 ENCOUNTER — LAB REQUISITION (OUTPATIENT)
Dept: LAB | Facility: HOSPITAL | Age: 49
End: 2024-05-03
Payer: COMMERCIAL

## 2024-05-03 DIAGNOSIS — Z01.818 PRE-OP EVALUATION: ICD-10-CM

## 2024-05-03 DIAGNOSIS — Z01.818 ENCOUNTER FOR OTHER PREPROCEDURAL EXAMINATION: ICD-10-CM

## 2024-05-03 LAB
ABO GROUP BLD: NORMAL
BLD GP AB SCN SERPL QL: NEGATIVE
RH BLD: POSITIVE
SPECIMEN EXPIRATION DATE: NORMAL

## 2024-05-03 PROCEDURE — 86900 BLOOD TYPING SEROLOGIC ABO: CPT | Performed by: STUDENT IN AN ORGANIZED HEALTH CARE EDUCATION/TRAINING PROGRAM

## 2024-05-03 PROCEDURE — 36415 COLL VENOUS BLD VENIPUNCTURE: CPT

## 2024-05-03 PROCEDURE — 86901 BLOOD TYPING SEROLOGIC RH(D): CPT | Performed by: STUDENT IN AN ORGANIZED HEALTH CARE EDUCATION/TRAINING PROGRAM

## 2024-05-03 PROCEDURE — 86850 RBC ANTIBODY SCREEN: CPT | Performed by: STUDENT IN AN ORGANIZED HEALTH CARE EDUCATION/TRAINING PROGRAM

## 2024-05-07 ENCOUNTER — ANESTHESIA EVENT (OUTPATIENT)
Dept: PERIOP | Facility: HOSPITAL | Age: 49
End: 2024-05-07
Payer: COMMERCIAL

## 2024-05-07 NOTE — PRE-PROCEDURE INSTRUCTIONS
Pre-Surgery Instructions:   Medication Instructions    cetirizine (ZyrTEC) 10 mg tablet Hold day of surgery.    fluticasone (FLONASE) 50 mcg/act nasal spray Take day of surgery.    multivitamin (THERAGRAN) TABS Hold day of surgery.    Medication instructions for day surgery reviewed. Please use only a sip of water to take your instructed medications. Avoid all over the counter vitamins, supplements and NSAIDS for one week prior to surgery per anesthesia guidelines. Tylenol is ok to take as needed.     You will receive a call one business day prior to surgery with an arrival time and hospital directions. If your surgery is scheduled on a Monday, the hospital will be calling you on the Friday prior to your surgery. If you have not heard from anyone by 8pm, please call the hospital supervisor through the hospital  at [phone]. ([place] [phone] or [place] [phone]).    Do not eat or drink anything after midnight the night before your surgery, including candy, mints, lifesavers, or chewing gum. Do not drink alcohol 24hrs before your surgery. Try not to smoke at least 24hrs before your surgery.       Follow the pre surgery showering instructions as listed in the “My Surgical Experience Booklet” or otherwise provided by your surgeon's office. Do not use a blade to shave the surgical area 1 week before surgery. It is okay to use a clean electric clippers up to 24 hours before surgery. Do not apply any lotions, creams, including makeup, cologne, deodorant, or perfumes after showering on the day of your surgery. Do not use dry shampoo, hair spray, hair gel, or any type of hair products.     No contact lenses, eye make-up, or artificial eyelashes. Remove nail polish, including gel polish, and any artificial, gel, or acrylic nails if possible. Remove all jewelry including rings and body piercing jewelry.     Wear causal clothing that is easy to take on and off. Consider your type of surgery.    Keep any  valuables, jewelry, piercings at home. Please bring any specially ordered equipment (sling, braces) if indicated.    Arrange for a responsible person to drive you to and from the hospital on the day of your surgery. Please confirm the visitor policy for the day of your procedure when you receive your phone call with an arrival time.     Call the surgeon's office with any new illnesses, exposures, or additional questions prior to surgery.    Please reference your “My Surgical Experience Booklet” for additional information to prepare for your upcoming surgery.    Pt instructed to stop nsaids and supplements prior to surgery.  Pt verbalized understanding of shower ,med and pre-op ensure instructions.

## 2024-05-07 NOTE — ANESTHESIA PREPROCEDURE EVALUATION
"Procedure:  TOTAL LAPAROSCOPIC HYSTERECTOMY BILATERAL  OOPHERECTOMYEXAM UNDER ANESTHESIA (Bilateral: Abdomen)  CYSTOSCOPY (Bladder)    Relevant Problems   ANESTHESIA (within normal limits)      CARDIO (within normal limits)      ENDO (within normal limits)      GI/HEPATIC (within normal limits)      /RENAL (within normal limits)      HEMATOLOGY (within normal limits)      NEURO/PSYCH (within normal limits)      Behavioral Health   (+) Tobacco use      Oncology   (+) PARRIS III (vulvar intraepithelial neoplasia III)      Other   (+) Prediabetes      EKG 10/2020:  Normal sinus rhythm  Rightward axis  Nonspecific T wave abnormality  Abnormal ECG    CTAP 3/19/2024:  1. No CT evidence of acute inflammatory process within the abdomen or pelvis.     2. Moderate colonic stool burden suggestive of constipation.     3. Uterus is prominent with heterogeneous myometrial attenuation. This finding is nonspecific but may be attributable to underlying adenomyosis or fibroid changes. Findings suggestive of adenomyosis were noted on prior ultrasound of August 1, 2023.     4. Numerous sclerotic foci within the bony pelvis and spine measuring up to 1.2 cm. These findings are nonspecific but likely represent bone islands in the absence of significant cancer history.    Lab Results   Component Value Date    WBC 7.44 03/19/2024    HGB 14.3 03/19/2024    HCT 43.3 03/19/2024    MCV 89 03/19/2024     03/19/2024     Lab Results   Component Value Date    SODIUM 140 03/19/2024    K 3.7 03/19/2024     03/19/2024    CO2 26 03/19/2024    BUN 17 03/19/2024    CREATININE 0.91 03/19/2024    GLUC 68 03/19/2024    CALCIUM 9.7 03/19/2024     No results found for: \"INR\", \"PROTIME\"  Lab Results   Component Value Date    HGBA1C 5.9 (H) 01/22/2024          Physical Exam    Airway    Mallampati score: I  TM Distance: >3 FB  Neck ROM: full     Dental   No notable dental hx     Cardiovascular  Cardiovascular exam normal    Pulmonary  Pulmonary exam " normal     Other Findings  post-pubertal.      Anesthesia Plan  ASA Score- 2     Anesthesia Type- general with ASA Monitors.         Additional Monitors:     Airway Plan: ETT.    Comment: Possible bilateral truncal plane blocks for postoperative pain if converted to open procedure    Discussed with patient plan for anesthesia, as well as risks/benefits, including likely chance of PONV and sore throat, as well as rare possibilities of dental/oropharyngeal/ocular injuries, aspiration, and severe/life-threatening surgical and anesthetic emergencies.  Patient expressed understanding and agreement.  .       Plan Factors-Exercise tolerance (METS): >4 METS.    Chart reviewed. EKG reviewed. Imaging results reviewed. Existing labs reviewed. Patient summary reviewed.                  Induction- intravenous.    Postoperative Plan-     Informed Consent- Anesthetic plan and risks discussed with patient.  I personally reviewed this patient with the CRNA. Discussed and agreed on the Anesthesia Plan with the CRNA..

## 2024-05-08 ENCOUNTER — ANESTHESIA (OUTPATIENT)
Dept: PERIOP | Facility: HOSPITAL | Age: 49
End: 2024-05-08
Payer: COMMERCIAL

## 2024-05-08 ENCOUNTER — HOSPITAL ENCOUNTER (OUTPATIENT)
Facility: HOSPITAL | Age: 49
Setting detail: OUTPATIENT SURGERY
Discharge: HOME | End: 2024-05-08
Attending: STUDENT IN AN ORGANIZED HEALTH CARE EDUCATION/TRAINING PROGRAM | Admitting: STUDENT IN AN ORGANIZED HEALTH CARE EDUCATION/TRAINING PROGRAM
Payer: COMMERCIAL

## 2024-05-08 VITALS
HEIGHT: 62 IN | RESPIRATION RATE: 14 BRPM | DIASTOLIC BLOOD PRESSURE: 58 MMHG | TEMPERATURE: 97.7 F | OXYGEN SATURATION: 97 % | HEART RATE: 75 BPM | BODY MASS INDEX: 29.44 KG/M2 | WEIGHT: 160 LBS | SYSTOLIC BLOOD PRESSURE: 104 MMHG

## 2024-05-08 DIAGNOSIS — N94.6 DYSMENORRHEA: ICD-10-CM

## 2024-05-08 DIAGNOSIS — N93.9 ABNORMAL UTERINE BLEEDING (AUB): ICD-10-CM

## 2024-05-08 DIAGNOSIS — Z90.710 S/P LAPAROSCOPIC HYSTERECTOMY: Primary | ICD-10-CM

## 2024-05-08 LAB
EXT PREGNANCY TEST URINE: NEGATIVE
EXT. CONTROL: NORMAL
GLUCOSE SERPL-MCNC: 110 MG/DL (ref 65–140)

## 2024-05-08 PROCEDURE — 88307 TISSUE EXAM BY PATHOLOGIST: CPT | Performed by: SPECIALIST

## 2024-05-08 PROCEDURE — 81025 URINE PREGNANCY TEST: CPT | Performed by: STUDENT IN AN ORGANIZED HEALTH CARE EDUCATION/TRAINING PROGRAM

## 2024-05-08 PROCEDURE — 58542 LSH W/T/O UT 250 G OR LESS: CPT | Performed by: STUDENT IN AN ORGANIZED HEALTH CARE EDUCATION/TRAINING PROGRAM

## 2024-05-08 PROCEDURE — 82948 REAGENT STRIP/BLOOD GLUCOSE: CPT

## 2024-05-08 RX ORDER — IBUPROFEN 400 MG/1
600 TABLET ORAL EVERY 6 HOURS PRN
Status: DISCONTINUED | OUTPATIENT
Start: 2024-05-08 | End: 2024-05-08 | Stop reason: HOSPADM

## 2024-05-08 RX ORDER — FENTANYL CITRATE/PF 50 MCG/ML
25 SYRINGE (ML) INJECTION
Status: DISCONTINUED | OUTPATIENT
Start: 2024-05-08 | End: 2024-05-08 | Stop reason: HOSPADM

## 2024-05-08 RX ORDER — GLYCOPYRROLATE 0.2 MG/ML
INJECTION INTRAMUSCULAR; INTRAVENOUS AS NEEDED
Status: DISCONTINUED | OUTPATIENT
Start: 2024-05-08 | End: 2024-05-08

## 2024-05-08 RX ORDER — OXYCODONE HYDROCHLORIDE 5 MG/1
5 TABLET ORAL EVERY 6 HOURS PRN
Qty: 5 TABLET | Refills: 0 | Status: SHIPPED | OUTPATIENT
Start: 2024-05-08 | End: 2024-05-21

## 2024-05-08 RX ORDER — ONDANSETRON 2 MG/ML
4 INJECTION INTRAMUSCULAR; INTRAVENOUS EVERY 6 HOURS PRN
Status: DISCONTINUED | OUTPATIENT
Start: 2024-05-08 | End: 2024-05-08 | Stop reason: HOSPADM

## 2024-05-08 RX ORDER — IBUPROFEN 800 MG/1
800 TABLET ORAL EVERY 8 HOURS PRN
Qty: 30 TABLET | Refills: 0 | Status: SHIPPED | OUTPATIENT
Start: 2024-05-08 | End: 2024-05-21

## 2024-05-08 RX ORDER — PROPOFOL 10 MG/ML
INJECTION, EMULSION INTRAVENOUS CONTINUOUS PRN
Status: DISCONTINUED | OUTPATIENT
Start: 2024-05-08 | End: 2024-05-08

## 2024-05-08 RX ORDER — CEFAZOLIN SODIUM 2 G/50ML
2000 SOLUTION INTRAVENOUS ONCE
Status: DISCONTINUED | OUTPATIENT
Start: 2024-05-08 | End: 2024-05-08 | Stop reason: HOSPADM

## 2024-05-08 RX ORDER — MIDAZOLAM HYDROCHLORIDE 2 MG/2ML
INJECTION, SOLUTION INTRAMUSCULAR; INTRAVENOUS AS NEEDED
Status: DISCONTINUED | OUTPATIENT
Start: 2024-05-08 | End: 2024-05-08

## 2024-05-08 RX ORDER — CELECOXIB 200 MG/1
200 CAPSULE ORAL ONCE
Status: COMPLETED | OUTPATIENT
Start: 2024-05-08 | End: 2024-05-08

## 2024-05-08 RX ORDER — SODIUM CHLORIDE, SODIUM LACTATE, POTASSIUM CHLORIDE, CALCIUM CHLORIDE 600; 310; 30; 20 MG/100ML; MG/100ML; MG/100ML; MG/100ML
INJECTION, SOLUTION INTRAVENOUS CONTINUOUS PRN
Status: DISCONTINUED | OUTPATIENT
Start: 2024-05-08 | End: 2024-05-08

## 2024-05-08 RX ORDER — CHLORHEXIDINE GLUCONATE ORAL RINSE 1.2 MG/ML
15 SOLUTION DENTAL ONCE
Status: COMPLETED | OUTPATIENT
Start: 2024-05-08 | End: 2024-05-08

## 2024-05-08 RX ORDER — KETAMINE HCL IN NACL, ISO-OSM 100MG/10ML
SYRINGE (ML) INJECTION AS NEEDED
Status: DISCONTINUED | OUTPATIENT
Start: 2024-05-08 | End: 2024-05-08

## 2024-05-08 RX ORDER — ROCURONIUM BROMIDE 10 MG/ML
INJECTION, SOLUTION INTRAVENOUS AS NEEDED
Status: DISCONTINUED | OUTPATIENT
Start: 2024-05-08 | End: 2024-05-08

## 2024-05-08 RX ORDER — FENTANYL CITRATE 50 UG/ML
INJECTION, SOLUTION INTRAMUSCULAR; INTRAVENOUS AS NEEDED
Status: DISCONTINUED | OUTPATIENT
Start: 2024-05-08 | End: 2024-05-08

## 2024-05-08 RX ORDER — METRONIDAZOLE 500 MG/100ML
INJECTION, SOLUTION INTRAVENOUS CONTINUOUS PRN
Status: DISCONTINUED | OUTPATIENT
Start: 2024-05-08 | End: 2024-05-08

## 2024-05-08 RX ORDER — ACETAMINOPHEN 325 MG/1
1000 TABLET ORAL ONCE
Status: COMPLETED | OUTPATIENT
Start: 2024-05-08 | End: 2024-05-08

## 2024-05-08 RX ORDER — HYDROMORPHONE HCL/PF 1 MG/ML
SYRINGE (ML) INJECTION AS NEEDED
Status: DISCONTINUED | OUTPATIENT
Start: 2024-05-08 | End: 2024-05-08

## 2024-05-08 RX ORDER — DEXAMETHASONE SODIUM PHOSPHATE 10 MG/ML
INJECTION, SOLUTION INTRAMUSCULAR; INTRAVENOUS AS NEEDED
Status: DISCONTINUED | OUTPATIENT
Start: 2024-05-08 | End: 2024-05-08

## 2024-05-08 RX ORDER — HYDROMORPHONE HCL/PF 1 MG/ML
0.5 SYRINGE (ML) INJECTION
Status: DISCONTINUED | OUTPATIENT
Start: 2024-05-08 | End: 2024-05-08 | Stop reason: HOSPADM

## 2024-05-08 RX ORDER — KETOROLAC TROMETHAMINE 30 MG/ML
INJECTION, SOLUTION INTRAMUSCULAR; INTRAVENOUS AS NEEDED
Status: DISCONTINUED | OUTPATIENT
Start: 2024-05-08 | End: 2024-05-08

## 2024-05-08 RX ORDER — METRONIDAZOLE 500 MG/100ML
500 INJECTION, SOLUTION INTRAVENOUS ONCE
Status: DISCONTINUED | OUTPATIENT
Start: 2024-05-08 | End: 2024-05-08 | Stop reason: HOSPADM

## 2024-05-08 RX ORDER — OXYCODONE HYDROCHLORIDE 5 MG/1
5 TABLET ORAL EVERY 4 HOURS PRN
Status: DISCONTINUED | OUTPATIENT
Start: 2024-05-08 | End: 2024-05-08 | Stop reason: HOSPADM

## 2024-05-08 RX ORDER — LIDOCAINE HYDROCHLORIDE 20 MG/ML
INJECTION, SOLUTION EPIDURAL; INFILTRATION; INTRACAUDAL; PERINEURAL AS NEEDED
Status: DISCONTINUED | OUTPATIENT
Start: 2024-05-08 | End: 2024-05-08

## 2024-05-08 RX ORDER — CEFAZOLIN SODIUM 1 G/3ML
INJECTION, POWDER, FOR SOLUTION INTRAMUSCULAR; INTRAVENOUS AS NEEDED
Status: DISCONTINUED | OUTPATIENT
Start: 2024-05-08 | End: 2024-05-08

## 2024-05-08 RX ORDER — BUPIVACAINE HYDROCHLORIDE 2.5 MG/ML
INJECTION, SOLUTION EPIDURAL; INFILTRATION; INTRACAUDAL AS NEEDED
Status: DISCONTINUED | OUTPATIENT
Start: 2024-05-08 | End: 2024-05-08 | Stop reason: HOSPADM

## 2024-05-08 RX ORDER — LIDOCAINE HYDROCHLORIDE 10 MG/ML
0.5 INJECTION, SOLUTION EPIDURAL; INFILTRATION; INTRACAUDAL; PERINEURAL ONCE AS NEEDED
Status: DISCONTINUED | OUTPATIENT
Start: 2024-05-08 | End: 2024-05-08 | Stop reason: HOSPADM

## 2024-05-08 RX ORDER — ONDANSETRON 2 MG/ML
INJECTION INTRAMUSCULAR; INTRAVENOUS AS NEEDED
Status: DISCONTINUED | OUTPATIENT
Start: 2024-05-08 | End: 2024-05-08

## 2024-05-08 RX ORDER — PROPOFOL 10 MG/ML
INJECTION, EMULSION INTRAVENOUS AS NEEDED
Status: DISCONTINUED | OUTPATIENT
Start: 2024-05-08 | End: 2024-05-08

## 2024-05-08 RX ORDER — OXYCODONE HYDROCHLORIDE 10 MG/1
10 TABLET ORAL EVERY 4 HOURS PRN
Status: DISCONTINUED | OUTPATIENT
Start: 2024-05-08 | End: 2024-05-08 | Stop reason: HOSPADM

## 2024-05-08 RX ORDER — ACETAMINOPHEN 325 MG/1
975 TABLET ORAL EVERY 6 HOURS PRN
Status: DISCONTINUED | OUTPATIENT
Start: 2024-05-08 | End: 2024-05-08 | Stop reason: HOSPADM

## 2024-05-08 RX ADMIN — HYDROMORPHONE HYDROCHLORIDE 0.5 MG: 1 INJECTION, SOLUTION INTRAMUSCULAR; INTRAVENOUS; SUBCUTANEOUS at 14:55

## 2024-05-08 RX ADMIN — CHLORHEXIDINE GLUCONATE 0.12% ORAL RINSE 15 ML: 1.2 LIQUID ORAL at 11:40

## 2024-05-08 RX ADMIN — KETOROLAC TROMETHAMINE 15 MG: 30 INJECTION, SOLUTION INTRAMUSCULAR; INTRAVENOUS at 14:55

## 2024-05-08 RX ADMIN — GLYCOPYRROLATE 0.2 MG: 0.2 INJECTION, SOLUTION INTRAMUSCULAR; INTRAVENOUS at 13:35

## 2024-05-08 RX ADMIN — PROPOFOL 30 MCG/KG/MIN: 10 INJECTION, EMULSION INTRAVENOUS at 13:11

## 2024-05-08 RX ADMIN — ROCURONIUM BROMIDE 20 MG: 10 INJECTION, SOLUTION INTRAVENOUS at 14:09

## 2024-05-08 RX ADMIN — Medication 50 MG: at 13:07

## 2024-05-08 RX ADMIN — FENTANYL CITRATE 25 MCG: 50 INJECTION INTRAMUSCULAR; INTRAVENOUS at 15:36

## 2024-05-08 RX ADMIN — LIDOCAINE HYDROCHLORIDE 60 MG: 20 INJECTION, SOLUTION EPIDURAL; INFILTRATION; INTRACAUDAL at 13:07

## 2024-05-08 RX ADMIN — ACETAMINOPHEN 975 MG: 325 TABLET, FILM COATED ORAL at 11:39

## 2024-05-08 RX ADMIN — ONDANSETRON 4 MG: 2 INJECTION INTRAMUSCULAR; INTRAVENOUS at 13:07

## 2024-05-08 RX ADMIN — FENTANYL CITRATE 100 MCG: 50 INJECTION INTRAMUSCULAR; INTRAVENOUS at 13:07

## 2024-05-08 RX ADMIN — FENTANYL CITRATE 25 MCG: 50 INJECTION INTRAMUSCULAR; INTRAVENOUS at 15:52

## 2024-05-08 RX ADMIN — MIDAZOLAM 2 MG: 1 INJECTION INTRAMUSCULAR; INTRAVENOUS at 12:57

## 2024-05-08 RX ADMIN — CEFAZOLIN 2000 MG: 1 INJECTION, POWDER, FOR SOLUTION INTRAMUSCULAR; INTRAVENOUS at 13:11

## 2024-05-08 RX ADMIN — CELECOXIB 200 MG: 200 CAPSULE ORAL at 11:39

## 2024-05-08 RX ADMIN — DEXAMETHASONE SODIUM PHOSPHATE 10 MG: 10 INJECTION, SOLUTION INTRAMUSCULAR; INTRAVENOUS at 13:07

## 2024-05-08 RX ADMIN — PHENYLEPHRINE HYDROCHLORIDE 20 MCG/MIN: 10 INJECTION INTRAVENOUS at 13:28

## 2024-05-08 RX ADMIN — ROCURONIUM BROMIDE 50 MG: 10 INJECTION, SOLUTION INTRAVENOUS at 13:07

## 2024-05-08 RX ADMIN — PROPOFOL 150 MG: 10 INJECTION, EMULSION INTRAVENOUS at 13:07

## 2024-05-08 RX ADMIN — METRONIDAZOLE: 500 SOLUTION INTRAVENOUS at 13:11

## 2024-05-08 RX ADMIN — SODIUM CHLORIDE, SODIUM LACTATE, POTASSIUM CHLORIDE, AND CALCIUM CHLORIDE: .6; .31; .03; .02 INJECTION, SOLUTION INTRAVENOUS at 12:57

## 2024-05-08 NOTE — ANESTHESIA POSTPROCEDURE EVALUATION
Post-Op Assessment Note    CV Status:  Stable    Pain management: adequate       Mental Status:  Alert and awake   Hydration Status:  Euvolemic   PONV Controlled:  Controlled   Airway Patency:  Patent     Post Op Vitals Reviewed: Yes    No anethesia notable event occurred.    Staff: [email or web address]           BP   116/53   Temp   97.3   Pulse  94   Resp   24   SpO2   100

## 2024-05-08 NOTE — OP NOTE
OPERATIVE REPORT  PATIENT NAME: Elena Blackwell    :  1975  MRN: [id number]  Pt Location: [facility] OR ROOM 06    SURGERY DATE: 2024    Surgeons and Role:     * Sujata Kang MD - Primary     * Margie Rinaldi MD - Assisting    Preop Diagnosis:  Abnormal uterine bleeding (AUB) [N93.9]  Dysmenorrhea [N94.6]    Post-Op Diagnosis Codes:     * Abnormal uterine bleeding (AUB) [N93.9]     * Dysmenorrhea [N94.6]    Procedure(s):  Bilateral - LAPAROSCOPIC SUPRACERVICAL HYSTERECTOMY RIGHT OOPHERECTOMY AND EXAM UNDER ANESTHESIA  CYSTOSCOPY  LYSIS ADHESIONS    Specimen(s):  ID Type Source Tests Collected by Time Destination   1 : with right ovary Tissue Uterus TISSUE EXAM Sujata Kang MD 2024 [id number]        Estimated Blood Loss:   100 mL    Drains:  100 mL urine    Anesthesia Type:   General    Operative Indications:  Abnormal uterine bleeding (AUB) [N93.9]  Dysmenorrhea [N94.6]    Operative Findings:  NEFG, multiparous cervix, uterus sounded to 9 cm  On laparoscopy, fallopian tubes surgically absent, right ovary normal, posterior uterus with dense adhesions to the descending colon, encompassing the left ovary. Normal liver edge and stomach.   Cystoscopy with bladder intact and brisk jets from bilateral ureteral orifices    Complications:   None    Procedure and Technique:  Brief History     Risks, benefits, and alternatives to the procedure were discussed with the patient and she had the opportunity to ask questions. Informed consent was previously obtained, but reviewed.     Description of Procedure     Patient was taken to the operating room where a time out was performed to confirm correct patient and correct procedure. 2 grams of Ancef and 500 mg flagyl were given for infection prophylaxis. General endotracheal anesthesia (GET) was administered and the patient was positioned on the OR table in the dorsal lithotomy position. All pressure points were padded and a Jacobo hugger was placed to maintain  control of core body temperature. A bimanual exam was performed, with the above findings, notably a normal uterus with no palpable adnexal masses or fullness. The patient was prepped and draped in the usual sterile fashion with chloroprep on the abdomen, chlorhexidine for the vagina and perineum. A brink catheter was placed to drainage.     Operative Technique     Speculum was placed in the vagina and the anterior lip of the cervix was grasped with an Allis clamp for traction.  The uterus was sounded to 9cm.  The cervix was then sequentially dilated to accommodate a Patito uterine manipulator which was placed without difficulty.  All other instruments were removed from the vagina.       Attention was then turned to the abdomen for laparoscopy. Sole clamp was used to invert the umbilicus for accessibility.  The base of the umbilicus was grasped and everted with penetrating towel clamps. The veres needle was introduced, under low flow, with entry into the abdominal cavity confirmed with a drop in pressure. The abdomen was insufflated to 15 mmHg, after which a 10 mm incision was made at the base of the umbilicus. A 10mm trochar was introduced under direct visualization and without difficulty. The area underlying the Veres entry was examined, with no damage to the bowel or overlying omentum noted. Subsequently, the entire abdomen and pelvis was inspected and there was no evidence of injury to bowel, bladder, vasculature, or other structures. Liver edge and stomach were noted to be normal.      The patient was placed in Trendelenburg position and attention was then turned to the pelvis. The uterus was elevated to visualize the ovaries, as well as the cul de sac. Findings were as noted above. Notably, uterus was normal with dense adhesions to the descending colon to the level of the lower uterine segment.     Additional 5mm trochars was introduced in the right and left lower abdomen approximately 2cm superior and medial to  the iliac crests, under direct visualization, after the injection of marcaine.        The round ligament was identified on the left side and transected with the Harmonic scalpel.  With the uterus retracted to the opposite side, the anterior leaf of the broad ligament was dissected and the bladder flap developed off the lower uterine segment.  The same was performed on the right side.     The posterior leaf of the right broad ligament was divided and the IP ligament skeletonized. Subsequently, the IP was identified and transected with the Harmonic scalpel.     Attention was turned to the left pelvic sidewall and posterior uterus, where rectouterine adhesions were sharply taken down with the laparoscopic scissors, taking care to avoid any rectal tissue. After significant dissection, the left ovary was not able to be safely freed from the side wall. Decision was made to halt efforts and the uteroovarian ligament was coagulated and transsected with the Harmonic scalpel. The rectal tissue was further dissected, to the level of the cervix. While the anterior uterus was free of adhesions, the rectal tissue was not able to be safely released to the level of the vagina, so the decision was made to proceed with supracervical hysterectomy.     The right uterine vessel was skeletonized and coagulated with the Kleppinger bipolar device. The same was performed on the left side. The uterus was noted to be appropriately blanched, so the Harmonic scalpel was then used to coagulate and transect the uterine arteries bilaterally.       The lower uterine segment/cervix was divided with the bovie, taking care to avoid any contact with or injury to the bowel.     A specimen bag was introduced and the uterus and right ovary elevated to the abdominal wall. The specimen was morcellated for removal, without difficulty or injury to surrounding tissue, or breakage of the bad. The specimen was removed, passed off, and submitted to pathology for  analysis.     The cervical stump was coagulated with the bovie. The pelvis was suction-irrigated with excellent hemostasis noted. Laparoscopic inspection of the cervix and bilateral pedicles under low pressure also revealed excellent hemostasis. Vaginal exam revealed intact vagina without defects or lacerations.     The patient was taken out of Trendelenberg.  All instruments were removed from abdomen, and the pneumoperitoneum was released before removing the ports.       The brink catheter was removed and cystoscopy was performed. Survey revealed an intact bladder without any defects or stitches.  Bilateral ureteral jets noted.  The bladder was drained.       The umbilical fascia was closed with 0-vicryl and the skin incisions were closed with 4-0 monocryl in an interrupted fashion. Surgical glue was applied to the skin incisions.  All counts were correct times two.  The patient tolerated the procedure well, and was transferred to the recovery room in stable condition.         I was present for the entire procedure.    Patient Disposition:  PACU  and extubated and stable    This procedure was not performed to treat colon cancer through resection      SIGNATURE: Sujata Kang MD  DATE: May 8, 2024  TIME: 3:19 PM

## 2024-05-08 NOTE — INTERVAL H&P NOTE
H&P reviewed. After examining the patient I find no changes in the patients condition since the H&P had been written.    Vitals:    05/08/24 1116   BP: 110/64   Pulse: 85   Temp: 98.3 °F (36.8 °C)   SpO2: 99%

## 2024-05-13 ENCOUNTER — TELEPHONE (OUTPATIENT)
Dept: OBGYN CLINIC | Facility: CLINIC | Age: 49
End: 2024-05-13

## 2024-05-13 PROCEDURE — 88307 TISSUE EXAM BY PATHOLOGIST: CPT | Performed by: SPECIALIST

## 2024-05-21 ENCOUNTER — OFFICE VISIT (OUTPATIENT)
Age: 49
End: 2024-05-21

## 2024-05-21 VITALS — WEIGHT: 158 LBS | DIASTOLIC BLOOD PRESSURE: 80 MMHG | SYSTOLIC BLOOD PRESSURE: 112 MMHG | BODY MASS INDEX: 28.9 KG/M2

## 2024-05-21 DIAGNOSIS — Z90.710 S/P LAPAROSCOPIC HYSTERECTOMY: ICD-10-CM

## 2024-05-21 DIAGNOSIS — N87.0 DYSPLASIA OF CERVIX, LOW GRADE (CIN 1): Primary | ICD-10-CM

## 2024-05-21 PROBLEM — Z90.79 STATUS POST BILATERAL SALPINGECTOMY: Status: RESOLVED | Noted: 2021-12-14 | Resolved: 2024-05-21

## 2024-05-21 PROCEDURE — 99024 POSTOP FOLLOW-UP VISIT: CPT | Performed by: STUDENT IN AN ORGANIZED HEALTH CARE EDUCATION/TRAINING PROGRAM

## 2024-05-22 ENCOUNTER — TELEPHONE (OUTPATIENT)
Age: 49
End: 2024-05-22

## 2024-05-22 DIAGNOSIS — F40.240 CLAUSTROPHOBIA: Primary | ICD-10-CM

## 2024-05-22 RX ORDER — LORAZEPAM 0.5 MG/1
0.5 TABLET ORAL ONCE AS NEEDED
Qty: 2 TABLET | Refills: 0 | Status: SHIPPED | OUTPATIENT
Start: 2024-05-22

## 2024-05-22 NOTE — TELEPHONE ENCOUNTER
Please inform patient that 2 doses of lorazepam 0.5 mg were sent to her pharmacy.  1 for each MRI.  She can take 30 minutes prior to MRI.  Thank you

## 2024-05-22 NOTE — TELEPHONE ENCOUNTER
Patient is scheduled for 2 MRIs - 5/29 and 5/31.  She says that she is claustrophobic and is asking for something to help.  Please call her.

## 2024-05-29 ENCOUNTER — HOSPITAL ENCOUNTER (OUTPATIENT)
Dept: MRI IMAGING | Facility: HOSPITAL | Age: 49
Discharge: HOME | End: 2024-05-29
Payer: COMMERCIAL

## 2024-05-29 DIAGNOSIS — M89.9 LESION OF PELVIC BONE: ICD-10-CM

## 2024-05-29 PROCEDURE — 72197 MRI PELVIS W/O & W/DYE: CPT

## 2024-05-29 PROCEDURE — A9585 GADOBUTROL INJECTION: HCPCS | Performed by: ORTHOPAEDIC SURGERY

## 2024-05-29 RX ORDER — GADOBUTROL 604.72 MG/ML
7 INJECTION INTRAVENOUS
Status: COMPLETED | OUTPATIENT
Start: 2024-05-29 | End: 2024-05-29

## 2024-05-29 RX ADMIN — GADOBUTROL 7 ML: 604.72 INJECTION INTRAVENOUS at 14:00

## 2024-05-31 ENCOUNTER — HOSPITAL ENCOUNTER (OUTPATIENT)
Dept: MRI IMAGING | Facility: HOSPITAL | Age: 49
End: 2024-05-31
Payer: COMMERCIAL

## 2024-05-31 DIAGNOSIS — M89.9 LESION OF BONE OF THORACIC SPINE: ICD-10-CM

## 2024-05-31 DIAGNOSIS — M89.9 LESION OF LUMBAR SPINE: ICD-10-CM

## 2024-05-31 PROCEDURE — 72157 MRI CHEST SPINE W/O & W/DYE: CPT

## 2024-05-31 PROCEDURE — A9585 GADOBUTROL INJECTION: HCPCS | Performed by: NURSE PRACTITIONER

## 2024-05-31 PROCEDURE — 72158 MRI LUMBAR SPINE W/O & W/DYE: CPT

## 2024-05-31 RX ORDER — GADOBUTROL 604.72 MG/ML
7 INJECTION INTRAVENOUS
Status: COMPLETED | OUTPATIENT
Start: 2024-05-31 | End: 2024-05-31

## 2024-05-31 RX ADMIN — GADOBUTROL 7 ML: 604.72 INJECTION INTRAVENOUS at 17:03

## 2024-06-03 ENCOUNTER — TELEPHONE (OUTPATIENT)
Age: 49
End: 2024-06-03

## 2024-06-03 ENCOUNTER — NURSE TRIAGE (OUTPATIENT)
Age: 49
End: 2024-06-03

## 2024-06-03 NOTE — TELEPHONE ENCOUNTER
"Lizbeth called reporting she is having green drainage, redness, painful to touch starting a couple days ago with discomfort, drainage as of last night. Denies fever.    Warm transfer to clerical who spoke with Dr. Kang medical assistant who will call lizbeth to discuss in approx 5-10 min.  Patient in agreement to plan    Reason for Disposition  • Incision gaping open and length of opening > 1/4 inch (6 mm) and on the face and over 2 days since wound re-opened    Answer Assessment - Initial Assessment Questions  1. SYMPTOM: \"What's the main symptom you're concerned about?\" (e.g., redness, pain, drainage)      Pain, redness, green drainage   2. ONSET: \"When did pain/drainage  start?\"      Couple days of discomfort with palpation. Drainage started last night  3. SURGERY: \"What surgery was performed?\"      hysterectomy  4. DATE of SURGERY: \"When was surgery performed?\"       5/8/2024  5. INCISION SITE: \"Where is the incision located?\"       Near naval  6. REDNESS: \"Is there any redness at the incision site?\" If yes, ask: \"How wide across is the redness?\" (Inches, centimeters)       yes  7. PAIN: \"Is there any pain?\" If Yes, ask: \"How bad is it?\"  (Scale 1-10; or mild, moderate, severe)      Yes 2/3.Worse when touching pain level 5  8. BLEEDING: \"Is there any bleeding?\" If Yes, ask: \"How much?\" and \"Where?\"      denies  9. DRAINAGE: \"Is there any drainage from the incision site?\" If yes, ask: \"What color and how much?\" (e.g., red, cloudy, pus; drops, teaspoon)      Yes, green  10. FEVER: \"Do you have a fever?\" If Yes, ask: \"What is your temperature, how was it measured, and when did it start?\"        Denies- states never gets temperature  11. OTHER SYMPTOMS: \"Do you have any other symptoms?\" (e.g., shaking chills, weakness, rash elsewhere on body)        + hot flashes    Protocols used: Post-Op Incision Symptoms and Questions-ADULT-[place]    "

## 2024-06-03 NOTE — TELEPHONE ENCOUNTER
Patient called, had hysterectomy 5/8, post-op appt 5/21. Dr. Kang recommends additional f/u visit 4 wks after (approx 6/18). Should this be scheduled as post-op as well since related to surgery f/u. Attempted warm transfer. Please reach out to pt if scheduled as post-op.

## 2024-06-03 NOTE — TELEPHONE ENCOUNTER
Regarding: redness in surgical area  ----- Message from Alethea INFANTE sent at 6/3/2024  2:16 PM EDT -----  Pt called in and stated that she has pain and redness on her belly button area. Pt has surgery 05/08

## 2024-06-04 ENCOUNTER — OFFICE VISIT (OUTPATIENT)
Age: 49
End: 2024-06-04

## 2024-06-04 VITALS — DIASTOLIC BLOOD PRESSURE: 80 MMHG | WEIGHT: 158.4 LBS | BODY MASS INDEX: 28.97 KG/M2 | SYSTOLIC BLOOD PRESSURE: 120 MMHG

## 2024-06-04 DIAGNOSIS — Z90.710 S/P LAPAROSCOPIC HYSTERECTOMY: Primary | ICD-10-CM

## 2024-06-04 DIAGNOSIS — L03.316 CELLULITIS OF UMBILICUS: ICD-10-CM

## 2024-06-04 PROBLEM — N92.6 IRREGULAR MENSTRUAL BLEEDING: Status: RESOLVED | Noted: 2021-04-14 | Resolved: 2024-06-04

## 2024-06-04 PROCEDURE — 99024 POSTOP FOLLOW-UP VISIT: CPT | Performed by: STUDENT IN AN ORGANIZED HEALTH CARE EDUCATION/TRAINING PROGRAM

## 2024-06-04 RX ORDER — SULFAMETHOXAZOLE AND TRIMETHOPRIM 800; 160 MG/1; MG/1
1 TABLET ORAL EVERY 12 HOURS SCHEDULED
Qty: 14 TABLET | Refills: 0 | Status: SHIPPED | OUTPATIENT
Start: 2024-06-04 | End: 2024-06-11

## 2024-06-04 NOTE — PROGRESS NOTES
Assessment/Plan:       Problem List Items Addressed This Visit          Surgery/Wound/Pain    S/P laparoscopic hysterectomy - Primary     Other Visit Diagnoses       Cellulitis of umbilicus        Relevant Medications    sulfamethoxazole-trimethoprim (BACTRIM DS) 800-160 mg per tablet              Plan     1. Will treat for superficial cellulitis. Safe and effective use bactrim reviewed  2. Wound care discussed  3. Activity restrictions: can gradually increase activity and weight lifted. Nothing per vagina until 6wk postop  4. Follow up: annual    Subjective:      Patient ID: Elena Blackwell is a 49 y.o. y.o. female.    HPI She presents today for 4 week postoperative follow up and for concern for periumbilical cellulitis. She is feeling very well. Denies fevers, chills, nausea, vomiting, diarrhea, constipation. No difficulty with activity.     The following portions of the patient's history were reviewed and updated as appropriate: allergies, current medications, past family history, past medical history, past social history, past surgical history and problem list.    Review of Systems  as above    Objective:  Vitals:    06/04/24 0934   BP: 120/80        Physical Exam   Constitutional: She is oriented to person, place, and time. She appears well-developed and well-nourished.   HENT:   Head: Normocephalic.   Eyes: EOM are normal.   Neck: Normal range of motion.   Pulmonary/Chest: Effort normal.   Abdominal: Soft. She exhibits no distension and no mass. There is no tenderness. There is no rebound and no guarding.   Incisions well-healed x3, but with 2cm light pink erythema periumbilically, from 3 to 9 o'clock. No induration, fluctuance  Musculoskeletal: Normal range of motion.   Neurological: She is alert and oriented to person, place, and time.   Skin: Skin is warm and dry.   Psychiatric: She has a normal mood and affect. Her behavior is normal.   Vitals reviewed.

## 2024-06-05 ENCOUNTER — TELEPHONE (OUTPATIENT)
Dept: FAMILY MEDICINE CLINIC | Facility: CLINIC | Age: 49
End: 2024-06-05

## 2024-06-05 NOTE — TELEPHONE ENCOUNTER
----- Message from EFRAIN Gaytan sent at 6/4/2024  1:48 PM EDT -----  Please inform patient that MRI of pelvis shows bone islands.  These are just extra growths on bones.  Appearance are benign and do not warrant further evaluation at this time.  Thank you

## 2024-06-06 ENCOUNTER — OFFICE VISIT (OUTPATIENT)
Dept: FAMILY MEDICINE CLINIC | Facility: CLINIC | Age: 49
End: 2024-06-06
Payer: COMMERCIAL

## 2024-06-06 VITALS
HEART RATE: 79 BPM | RESPIRATION RATE: 18 BRPM | BODY MASS INDEX: 29.22 KG/M2 | WEIGHT: 158.8 LBS | OXYGEN SATURATION: 98 % | SYSTOLIC BLOOD PRESSURE: 118 MMHG | HEIGHT: 62 IN | DIASTOLIC BLOOD PRESSURE: 78 MMHG

## 2024-06-06 DIAGNOSIS — E55.9 VITAMIN D INSUFFICIENCY: ICD-10-CM

## 2024-06-06 DIAGNOSIS — R73.03 PREDIABETES: ICD-10-CM

## 2024-06-06 DIAGNOSIS — Z00.00 HEALTHCARE MAINTENANCE: ICD-10-CM

## 2024-06-06 DIAGNOSIS — Z90.710 S/P LAPAROSCOPIC HYSTERECTOMY: Primary | ICD-10-CM

## 2024-06-06 DIAGNOSIS — M89.8X9 BONE ISLAND: ICD-10-CM

## 2024-06-06 DIAGNOSIS — E07.9 THYROID MASS: Primary | ICD-10-CM

## 2024-06-06 PROCEDURE — 99214 OFFICE O/P EST MOD 30 MIN: CPT | Performed by: NURSE PRACTITIONER

## 2024-06-06 NOTE — PROGRESS NOTES
Ambulatory Visit  Name: Elena Blackwell      : 1975      MRN: [id number]  Encounter Provider: EFRAIN Tobar  Encounter Date: 2024   Encounter department: [facility] [address]    Assessment & Plan   1. S/P laparoscopic hysterectomy  Assessment & Plan:  Procedure completed .  Per patient, single ovary remains.  Denies pelvic pain, fever, chills vaginal bleeding, urinary symptoms.  To continue follow-up with OB/GYN as scheduled.  2. Bone island  Comments:  Recent MRI reviewed with patient.  Denies pelvic/back pain. Advised to return for concerning symptoms.  3. Prediabetes  Assessment & Plan:  Has been attempting to make dietary adjustments and has been attempting to increase physical activity after surgical procedure.  Encouraged to continue with diet modifications.  To obtain blood work prior to next visit.  Orders:  -     Hemoglobin A1C; Future  -     Comprehensive metabolic panel; Future  4. Vitamin D insufficiency  -     Vitamin D 25 hydroxy; Future  5. Healthcare maintenance  -     Vitamin D 25 hydroxy; Future  -     TSH, 3rd generation with Free T4 reflex; Future  -     Hemoglobin A1C; Future  -     Comprehensive metabolic panel; Future  -     CBC and differential; Future  -     Lipid panel; Future       History of Present Illness     Patient presents to the office for follow-up after recent procedure.    Patient is status post laparoscopic hysterectomy on .  Per patient, a single ovary was left.  Patient has had follow-up with OB/GYN since procedure.  Did have local skin infection of umbilical area from laparoscopic procedure.  Completed course of Bactrim.  Patient denies fever, chills, pelvic pain, vaginal bleeding, urinary symptoms.  Eating and hydrating without difficulty        Review of Systems   Constitutional:  Negative for activity change, appetite change, chills and fever.   Eyes:  Negative for photophobia and visual  disturbance.   Respiratory:  Negative for cough, chest tightness and shortness of breath.    Cardiovascular:  Negative for chest pain and palpitations.   Gastrointestinal:  Negative for blood in stool, nausea and vomiting.   Genitourinary:  Negative for decreased urine volume, dysuria, flank pain, frequency, hematuria, pelvic pain, urgency and vaginal bleeding.   Musculoskeletal:  Negative for arthralgias, back pain and myalgias.   Neurological:  Negative for dizziness, weakness, light-headedness and headaches.   Hematological:  Negative for adenopathy. Does not bruise/bleed easily.   Psychiatric/Behavioral:  Negative for agitation and confusion.      Pertinent Medical History       Medical History Reviewed by provider this encounter:  Tobacco  Allergies  Meds  Problems  Med Hx  Surg Hx  Fam Hx  Soc   Hx      Past Medical History   Past Medical History:   Diagnosis Date    Abnormal Pap smear of cervix     Disease of thyroid gland     as a teenager    Pneumonia     Shingles 08/2020    Varicella      Past Surgical History:   Procedure Laterality Date    ABDOMINAL ADHESION SURGERY N/A 5/8/2024    Procedure: LYSIS ADHESIONS;  Surgeon: Sujata Kang MD;  Location: [facility];  Service: Gynecology    AUGMENTATION BREAST  2014    AUGMENTATION MAMMAPLASTY Bilateral     FOREIGN BODY REMOVAL Left 11/30/2021    Procedure: NEXPLANON REMOVAL;  Surgeon: Dana Santos MD;  Location: [facility];  Service: Gynecology    TN CYSTOURETHROSCOPY N/A 5/8/2024    Procedure: CYSTOSCOPY;  Surgeon: Sujata Kang MD;  [facility];  Service: Gynecology    TN LAPAROSCOPY W/RMVL ADNEXAL STRUCTURES N/A 11/30/2021    Procedure: OPERATIVE LAPAROSCOPY BILATERAL SALPINGECTOMY;  Surgeon: Dana Santos MD;  [facility];  Service: Gynecology    TN LAPS TOTAL HYSTERECT 250 GM/< W/RMVL TUBE/OVARY Bilateral 5/8/2024    Procedure: LAPAROSCOPIC SUPRACERVICAL HYSTERECTOMY RIGHT OOPHERECTOMY AND EXAM UNDER  ANESTHESIA;  Surgeon: Sujata Kang MD;  [facility]: [facility];  Service: Gynecology    TN VULVECTOMY SIMPLE PARTIAL Bilateral 01/09/2020    Procedure: SIMPLE VULVECTOMY;  Surgeon: Gadiel Christensen MD;  [facility];  Service: Gynecology Oncology    TUBAL LIGATION       Family History   Problem Relation Age of Onset    Hypertension Mother     Hypertension Father     No Known Problems Daughter     No Known Problems Son     Breast cancer Paternal Aunt     Ovarian cancer Neg Hx     Colon cancer Neg Hx      Current Outpatient Medications on File Prior to Visit   Medication Sig Dispense Refill    cetirizine (ZyrTEC) 10 mg tablet 10 mg daily      fluticasone (FLONASE) 50 mcg/act nasal spray INSTILL 1 SPRAY IN EACH NOSTRIL ONCE DAILY 16 mL 1    multivitamin (THERAGRAN) TABS Take 1 tablet by mouth daily      sulfamethoxazole-trimethoprim (BACTRIM DS) 800-160 mg per tablet Take 1 tablet by mouth every 12 (twelve) hours for 7 days 14 tablet 0    [DISCONTINUED] LORazepam (Ativan) 0.5 mg tablet Take 1 tablet (0.5 mg total) by mouth once as needed for anxiety Take 30 minutes prior to MRI (Patient not taking: Reported on 6/6/2024) 2 tablet 0     No current facility-administered medications on file prior to visit.   No Known Allergies   Current Outpatient Medications on File Prior to Visit   Medication Sig Dispense Refill    cetirizine (ZyrTEC) 10 mg tablet 10 mg daily      fluticasone (FLONASE) 50 mcg/act nasal spray INSTILL 1 SPRAY IN EACH NOSTRIL ONCE DAILY 16 mL 1    multivitamin (THERAGRAN) TABS Take 1 tablet by mouth daily      sulfamethoxazole-trimethoprim (BACTRIM DS) 800-160 mg per tablet Take 1 tablet by mouth every 12 (twelve) hours for 7 days 14 tablet 0    [DISCONTINUED] LORazepam (Ativan) 0.5 mg tablet Take 1 tablet (0.5 mg total) by mouth once as needed for anxiety Take 30 minutes prior to MRI (Patient not taking: Reported on 6/6/2024) 2 tablet 0     No current facility-administered medications on file  "prior to visit.      Social History     Tobacco Use    Smoking status: Every Day     Current packs/day: 1.00     Average packs/day: 1 pack/day for 25.0 years (25.0 ttl pk-yrs)     Types: Cigarettes    Smokeless tobacco: Never    Tobacco comments:     15 cigs per day   Vaping Use    Vaping status: Never Used   Substance and Sexual Activity    Alcohol use: Not Currently    Drug use: Never    Sexual activity: Yes     Partners: Male     Birth control/protection: None, Female Sterilization     Comment: tubal      Objective     /78 (BP Location: Left arm, Patient Position: Sitting)   Pulse 79   Resp 18   Ht 5' 2\" (1.575 m)   Wt 72 kg (158 lb 12.8 oz)   LMP 02/20/2024   SpO2 98%   BMI 29.04 kg/m²     Physical Exam  Vitals reviewed.   Constitutional:       General: She is not in acute distress.     Appearance: Normal appearance. She is not ill-appearing.   HENT:      Head: Normocephalic and atraumatic.      Right Ear: Tympanic membrane, ear canal and external ear normal.      Left Ear: Tympanic membrane, ear canal and external ear normal.      Nose: Nose normal.      Mouth/Throat:      Mouth: Mucous membranes are moist.      Pharynx: Oropharynx is clear.   Eyes:      Conjunctiva/sclera: Conjunctivae normal.      Pupils: Pupils are equal, round, and reactive to light.   Cardiovascular:      Rate and Rhythm: Normal rate and regular rhythm.      Pulses: Normal pulses.      Heart sounds: Normal heart sounds. No murmur heard.  Pulmonary:      Effort: Pulmonary effort is normal.      Breath sounds: Normal breath sounds.   Abdominal:      General: Bowel sounds are normal. There is no distension.      Palpations: Abdomen is soft.      Tenderness: There is no abdominal tenderness.          Comments: Healing laparoscopic incision marks noted. No s/s infection   Musculoskeletal:         General: No tenderness. Normal range of motion.      Cervical back: Normal range of motion and neck supple.   Skin:     General: Skin is " warm and dry.   Neurological:      General: No focal deficit present.      Mental Status: She is alert and oriented to person, place, and time.   Psychiatric:         Mood and Affect: Mood normal.         Behavior: Behavior normal.

## 2024-06-06 NOTE — ASSESSMENT & PLAN NOTE
Procedure completed 5/8.  Per patient, single ovary remains.  Denies pelvic pain, fever, chills vaginal bleeding, urinary symptoms.  To continue follow-up with OB/GYN as scheduled.

## 2024-06-06 NOTE — ASSESSMENT & PLAN NOTE
Has been attempting to make dietary adjustments and has been attempting to increase physical activity after surgical procedure.  Encouraged to continue with diet modifications.  To obtain blood work prior to next visit.

## 2024-06-13 ENCOUNTER — HOSPITAL ENCOUNTER (OUTPATIENT)
Dept: ULTRASOUND IMAGING | Facility: HOSPITAL | Age: 49
End: 2024-06-13
Payer: COMMERCIAL

## 2024-06-13 DIAGNOSIS — E07.9 THYROID MASS: ICD-10-CM

## 2024-06-13 PROCEDURE — 76536 US EXAM OF HEAD AND NECK: CPT

## 2024-06-20 ENCOUNTER — TELEPHONE (OUTPATIENT)
Dept: FAMILY MEDICINE CLINIC | Facility: CLINIC | Age: 49
End: 2024-06-20

## 2024-06-20 ENCOUNTER — TELEPHONE (OUTPATIENT)
Age: 49
End: 2024-06-20

## 2024-06-20 DIAGNOSIS — E04.2 MULTIPLE THYROID NODULES: Primary | ICD-10-CM

## 2024-06-20 NOTE — TELEPHONE ENCOUNTER
Brian from [facility] called to notify significant finding results of Thyroid US. Warm transferred to office.

## 2024-06-21 ENCOUNTER — TELEPHONE (OUTPATIENT)
Age: 49
End: 2024-06-21

## 2024-12-09 NOTE — PROGRESS NOTES
Assessment/Plan:     Problem List Items Addressed This Visit          Genitourinary    Dysplasia of cervix, low grade (ANAIS 1) - Primary       Obstetrics/Gynecology    Irregular menstrual bleeding     Other Visit Diagnoses       Encounter for Depo-Provera contraception        Dysmenorrhea        Relevant Medications    medroxyPROGESTERone (DEPO-PROVERA) 150 mg/mL injection          - prior ultrasound and recent CT scan reviewed, both consistent with adenomyosis  - ok to increase depo frequency to q10 weeks  - reviewed diagnosis of adenomyosis, management options. She has used many forms of contraception and is not interested in returning to these, would prefer definitive management with hysterectomy  - Pap up to date and normal  - needs EMB and preop. We discussed TVH today, but given operative report would lean towards TLH. She is considering whether she should request oophorectomy and we will discuss further    Subjective:      Patient ID: Elena Blackwell is a 48 y.o. female.    HPI  She presents today for ED follow up after she was evaluated for pelvic pain. She notes many years of pelvic pain and painful periods, has tried Nexplanon, depo. Previously amenorrhea and improved pain with depo, now has a full period and pain has returned. She had a preop appointment booked, but pain was so severe last night, that she presented to the ED for evaluation.     The following portions of the patient's history were reviewed and updated as appropriate: allergies, current medications, past family history, past medical history, past social history, past surgical history, and problem list.    Review of Systems  as above    Objective:  /76 (BP Location: Left arm, Patient Position: Sitting, Cuff Size: Standard)   Wt 71.7 kg (158 lb)   LMP 02/20/2024   BMI 28.90 kg/m²      Physical Exam  Vitals reviewed.   Constitutional:       Appearance: She is well-developed.   HENT:      Head: Normocephalic.  Patient has been notified of this matter.      Cardiovascular:      Rate and Rhythm: Normal rate.   Pulmonary:      Effort: Pulmonary effort is normal.   Abdominal:      General: There is no distension.      Palpations: Abdomen is soft.      Tenderness: There is no abdominal tenderness. There is no guarding or rebound.   Genitourinary:     General: Normal vulva.      Exam position: Lithotomy position.      Vagina: No bleeding.      Cervix: No cervical motion tenderness.      Comments: Minimal uterine descensus, small and mobile uterus, though tender  Musculoskeletal:         General: Normal range of motion.      Cervical back: Normal range of motion.   Skin:     General: Skin is warm and dry.   Neurological:      Mental Status: She is alert and oriented to person, place, and time.   Psychiatric:         Behavior: Behavior normal.             Ultrasound images personally reviewed - normal, mild adenomyosis    11/2021 bilateral salpingectomy operative report with adhesive disease of the posterior cul de sac   spouse

## 2025-01-17 ENCOUNTER — OFFICE VISIT (OUTPATIENT)
Dept: FAMILY MEDICINE CLINIC | Facility: CLINIC | Age: 50
End: 2025-01-17
Payer: COMMERCIAL

## 2025-01-17 VITALS
SYSTOLIC BLOOD PRESSURE: 120 MMHG | TEMPERATURE: 98.1 F | OXYGEN SATURATION: 98 % | BODY MASS INDEX: 29.26 KG/M2 | RESPIRATION RATE: 18 BRPM | HEIGHT: 62 IN | DIASTOLIC BLOOD PRESSURE: 76 MMHG | WEIGHT: 159 LBS | HEART RATE: 89 BPM

## 2025-01-17 DIAGNOSIS — T36.95XA ANTIBIOTIC-INDUCED YEAST INFECTION: ICD-10-CM

## 2025-01-17 DIAGNOSIS — J01.00 ACUTE NON-RECURRENT MAXILLARY SINUSITIS: Primary | ICD-10-CM

## 2025-01-17 DIAGNOSIS — E66.3 OVERWEIGHT (BMI 25.0-29.9): ICD-10-CM

## 2025-01-17 DIAGNOSIS — B37.9 ANTIBIOTIC-INDUCED YEAST INFECTION: ICD-10-CM

## 2025-01-17 DIAGNOSIS — H66.011 NON-RECURRENT ACUTE SUPPURATIVE OTITIS MEDIA OF RIGHT EAR WITH SPONTANEOUS RUPTURE OF TYMPANIC MEMBRANE: ICD-10-CM

## 2025-01-17 PROCEDURE — 99214 OFFICE O/P EST MOD 30 MIN: CPT | Performed by: NURSE PRACTITIONER

## 2025-01-17 RX ORDER — METHYLPREDNISOLONE 4 MG/1
TABLET ORAL
Qty: 21 EACH | Refills: 0 | Status: SHIPPED | OUTPATIENT
Start: 2025-01-17

## 2025-01-17 RX ORDER — FLUCONAZOLE 150 MG/1
150 TABLET ORAL ONCE
Qty: 2 TABLET | Refills: 0 | Status: SHIPPED | OUTPATIENT
Start: 2025-01-17 | End: 2025-01-17

## 2025-01-17 RX ORDER — PHENTERMINE HYDROCHLORIDE 15 MG/1
15 CAPSULE ORAL EVERY MORNING
Qty: 30 CAPSULE | Refills: 0 | Status: SHIPPED | OUTPATIENT
Start: 2025-01-17

## 2025-01-17 NOTE — PROGRESS NOTES
Name: Elena Blackwell      : 1975      MRN: [id number]  Encounter Provider: EFRAIN Tobar  Encounter Date: 2025   Encounter department: [facility] [address] [facility]  :  Assessment & Plan  Acute non-recurrent maxillary sinusitis  Advised warm compresses to face, saline rinses twice daily, steam, humidified/moist air, avoid irritants when possible.  Medications as prescribed.    Orders:    amoxicillin-clavulanate (AUGMENTIN) 875-125 mg per tablet; Take 1 tablet by mouth every 12 (twelve) hours for 7 days    methylPREDNISolone 4 MG tablet therapy pack; Use as directed on package    Non-recurrent acute suppurative otitis media of right ear with spontaneous rupture of tympanic membrane  Advise clearing ears out after showering, avoid Q-tip use, saline rinses twice daily, medications as prescribed.    Orders:    amoxicillin-clavulanate (AUGMENTIN) 875-125 mg per tablet; Take 1 tablet by mouth every 12 (twelve) hours for 7 days    methylPREDNISolone 4 MG tablet therapy pack; Use as directed on package    Antibiotic-induced yeast infection    Orders:    fluconazole (DIFLUCAN) 150 mg tablet; Take 1 tablet (150 mg total) by mouth once for 1 dose then take second dose 3 days later if symptoms not resolved.    Overweight (BMI 25.0-29.9)  Patient would like to be restarted on phentermine.  Has had success with it in the past.  Has been over a year since patient has been on medication.  Patient reeducated on how to take medication and possible side effects.  Encouraged to continue with diet modifications, physical activity as tolerated.    Orders:    phentermine 15 MG capsule; Take 1 capsule (15 mg total) by mouth every morning           History of Present Illness     Patient presents to the office for evaluation of frontal headache and right ear pain.  Symptoms started 2 weeks ago.  Was also noting increased congestion, postnasal drip.  Had leftover amoxicillin at  "her home, and began taking 500 mg twice daily for 5 days.  Symptoms did not improve.  Denies tinnitus, sore throat, cough, fever, chills.      Review of Systems   Constitutional:  Negative for activity change, appetite change, chills and fever.   HENT:  Positive for congestion, ear pain, postnasal drip and sinus pain. Negative for rhinorrhea, sore throat and trouble swallowing.    Eyes:  Negative for photophobia and visual disturbance.   Respiratory:  Negative for cough and shortness of breath.    Cardiovascular:  Negative for chest pain and palpitations.   Gastrointestinal:  Negative for nausea and vomiting.   Genitourinary:  Negative for decreased urine volume.   Musculoskeletal:  Negative for arthralgias, myalgias and neck pain.   Skin:  Negative for color change and rash.   Neurological:  Positive for headaches. Negative for dizziness, weakness and light-headedness.   Hematological:  Negative for adenopathy.   Psychiatric/Behavioral:  Negative for confusion.        Objective   /76 (BP Location: Left arm, Patient Position: Sitting)   Pulse 89   Temp 98.1 °F (36.7 °C)   Resp 18   Ht 5' 2\" (1.575 m)   Wt 72.1 kg (159 lb)   LMP 02/20/2024   SpO2 98%   BMI 29.08 kg/m²      Physical Exam  Vitals reviewed.   Constitutional:       General: She is not in acute distress.     Appearance: Normal appearance. She is not ill-appearing.   HENT:      Head: Normocephalic and atraumatic.      Left Ear: Tympanic membrane, ear canal and external ear normal.      Nose: Congestion present.      Right Turbinates: Not enlarged.      Left Turbinates: Not enlarged.      Right Sinus: Maxillary sinus tenderness present. No frontal sinus tenderness.      Left Sinus: Maxillary sinus tenderness present. No frontal sinus tenderness.      Mouth/Throat:      Mouth: Mucous membranes are moist.      Pharynx: Oropharynx is clear. No posterior oropharyngeal erythema.   Eyes:      Conjunctiva/sclera: Conjunctivae normal.      Pupils: " Pupils are equal, round, and reactive to light.   Cardiovascular:      Rate and Rhythm: Normal rate and regular rhythm.      Pulses: Normal pulses.      Heart sounds: Normal heart sounds. No murmur heard.  Pulmonary:      Effort: Pulmonary effort is normal.      Breath sounds: Normal breath sounds. No wheezing.   Musculoskeletal:         General: Normal range of motion.      Cervical back: Normal range of motion and neck supple.   Lymphadenopathy:      Cervical: No cervical adenopathy.   Skin:     General: Skin is warm and dry.   Neurological:      General: No focal deficit present.      Mental Status: She is alert and oriented to person, place, and time.   Psychiatric:         Mood and Affect: Mood normal.         Behavior: Behavior normal.

## 2025-01-31 ENCOUNTER — APPOINTMENT (OUTPATIENT)
Dept: LAB | Facility: CLINIC | Age: 50
End: 2025-01-31
Payer: COMMERCIAL

## 2025-01-31 DIAGNOSIS — R73.03 PREDIABETES: ICD-10-CM

## 2025-01-31 DIAGNOSIS — Z00.00 HEALTHCARE MAINTENANCE: ICD-10-CM

## 2025-01-31 DIAGNOSIS — E55.9 VITAMIN D INSUFFICIENCY: ICD-10-CM

## 2025-01-31 LAB
25(OH)D3 SERPL-MCNC: 27.3 NG/ML (ref 30–100)
ALBUMIN SERPL BCG-MCNC: 4.3 G/DL (ref 3.5–5)
ALP SERPL-CCNC: 72 U/L (ref 34–104)
ALT SERPL W P-5'-P-CCNC: 19 U/L (ref 7–52)
ANION GAP SERPL CALCULATED.3IONS-SCNC: 10 MMOL/L (ref 4–13)
AST SERPL W P-5'-P-CCNC: 22 U/L (ref 13–39)
BASOPHILS # BLD AUTO: 0.06 THOUSANDS/ΜL (ref 0–0.1)
BASOPHILS NFR BLD AUTO: 1 % (ref 0–1)
BILIRUB SERPL-MCNC: 0.34 MG/DL (ref 0.2–1)
BUN SERPL-MCNC: 18 MG/DL (ref 5–25)
CALCIUM SERPL-MCNC: 9.3 MG/DL (ref 8.4–10.2)
CHLORIDE SERPL-SCNC: 104 MMOL/L (ref 96–108)
CHOLEST SERPL-MCNC: 239 MG/DL (ref ?–200)
CO2 SERPL-SCNC: 26 MMOL/L (ref 21–32)
CREAT SERPL-MCNC: 0.86 MG/DL (ref 0.6–1.3)
EOSINOPHIL # BLD AUTO: 0.08 THOUSAND/ΜL (ref 0–0.61)
EOSINOPHIL NFR BLD AUTO: 1 % (ref 0–6)
ERYTHROCYTE [DISTWIDTH] IN BLOOD BY AUTOMATED COUNT: 13.5 % (ref 11.6–15.1)
EST. AVERAGE GLUCOSE BLD GHB EST-MCNC: 114 MG/DL
GFR SERPL CREATININE-BSD FRML MDRD: 80 ML/MIN/1.73SQ M
GLUCOSE P FAST SERPL-MCNC: 66 MG/DL (ref 65–99)
HBA1C MFR BLD: 5.6 %
HCT VFR BLD AUTO: 46.3 % (ref 34.8–46.1)
HDLC SERPL-MCNC: 65 MG/DL
HGB BLD-MCNC: 14.9 G/DL (ref 11.5–15.4)
IMM GRANULOCYTES # BLD AUTO: 0.02 THOUSAND/UL (ref 0–0.2)
IMM GRANULOCYTES NFR BLD AUTO: 0 % (ref 0–2)
LDLC SERPL CALC-MCNC: 159 MG/DL (ref 0–100)
LYMPHOCYTES # BLD AUTO: 2.23 THOUSANDS/ΜL (ref 0.6–4.47)
LYMPHOCYTES NFR BLD AUTO: 37 % (ref 14–44)
MCH RBC QN AUTO: 29.3 PG (ref 26.8–34.3)
MCHC RBC AUTO-ENTMCNC: 32.2 G/DL (ref 31.4–37.4)
MCV RBC AUTO: 91 FL (ref 82–98)
MONOCYTES # BLD AUTO: 0.68 THOUSAND/ΜL (ref 0.17–1.22)
MONOCYTES NFR BLD AUTO: 11 % (ref 4–12)
NEUTROPHILS # BLD AUTO: 2.98 THOUSANDS/ΜL (ref 1.85–7.62)
NEUTS SEG NFR BLD AUTO: 50 % (ref 43–75)
NONHDLC SERPL-MCNC: 174 MG/DL
NRBC BLD AUTO-RTO: 0 /100 WBCS
PLATELET # BLD AUTO: 221 THOUSANDS/UL (ref 149–390)
PMV BLD AUTO: 11.8 FL (ref 8.9–12.7)
POTASSIUM SERPL-SCNC: 4.9 MMOL/L (ref 3.5–5.3)
PROT SERPL-MCNC: 7.1 G/DL (ref 6.4–8.4)
RBC # BLD AUTO: 5.09 MILLION/UL (ref 3.81–5.12)
SODIUM SERPL-SCNC: 140 MMOL/L (ref 135–147)
TRIGL SERPL-MCNC: 74 MG/DL (ref ?–150)
TSH SERPL DL<=0.05 MIU/L-ACNC: 1.29 UIU/ML (ref 0.45–4.5)
WBC # BLD AUTO: 6.05 THOUSAND/UL (ref 4.31–10.16)

## 2025-01-31 PROCEDURE — 80061 LIPID PANEL: CPT

## 2025-01-31 PROCEDURE — 84443 ASSAY THYROID STIM HORMONE: CPT

## 2025-01-31 PROCEDURE — 36415 COLL VENOUS BLD VENIPUNCTURE: CPT

## 2025-01-31 PROCEDURE — 80053 COMPREHEN METABOLIC PANEL: CPT

## 2025-01-31 PROCEDURE — 82306 VITAMIN D 25 HYDROXY: CPT

## 2025-01-31 PROCEDURE — 85025 COMPLETE CBC W/AUTO DIFF WBC: CPT

## 2025-01-31 PROCEDURE — 83036 HEMOGLOBIN GLYCOSYLATED A1C: CPT

## 2025-02-04 ENCOUNTER — OFFICE VISIT (OUTPATIENT)
Dept: FAMILY MEDICINE CLINIC | Facility: CLINIC | Age: 50
End: 2025-02-04
Payer: COMMERCIAL

## 2025-02-04 ENCOUNTER — RESULTS FOLLOW-UP (OUTPATIENT)
Dept: FAMILY MEDICINE CLINIC | Facility: CLINIC | Age: 50
End: 2025-02-04

## 2025-02-04 VITALS
OXYGEN SATURATION: 95 % | RESPIRATION RATE: 12 BRPM | WEIGHT: 158.8 LBS | SYSTOLIC BLOOD PRESSURE: 120 MMHG | TEMPERATURE: 97.6 F | DIASTOLIC BLOOD PRESSURE: 70 MMHG | BODY MASS INDEX: 29.22 KG/M2 | HEIGHT: 62 IN | HEART RATE: 88 BPM

## 2025-02-04 DIAGNOSIS — H69.91 EUSTACHIAN TUBE DYSFUNCTION, RIGHT: ICD-10-CM

## 2025-02-04 DIAGNOSIS — Z12.31 SCREENING MAMMOGRAM FOR BREAST CANCER: ICD-10-CM

## 2025-02-04 DIAGNOSIS — E66.3 OVERWEIGHT (BMI 25.0-29.9): ICD-10-CM

## 2025-02-04 DIAGNOSIS — E78.2 MODERATE MIXED HYPERLIPIDEMIA NOT REQUIRING STATIN THERAPY: ICD-10-CM

## 2025-02-04 DIAGNOSIS — E55.9 VITAMIN D INSUFFICIENCY: ICD-10-CM

## 2025-02-04 DIAGNOSIS — Z00.00 ANNUAL PHYSICAL EXAM: Primary | ICD-10-CM

## 2025-02-04 PROCEDURE — 99396 PREV VISIT EST AGE 40-64: CPT | Performed by: NURSE PRACTITIONER

## 2025-02-04 PROCEDURE — 99213 OFFICE O/P EST LOW 20 MIN: CPT | Performed by: NURSE PRACTITIONER

## 2025-02-04 RX ORDER — PHENTERMINE HYDROCHLORIDE 30 MG/1
30 CAPSULE ORAL EVERY MORNING
Qty: 30 CAPSULE | Refills: 0 | Status: SHIPPED | OUTPATIENT
Start: 2025-02-04

## 2025-02-04 NOTE — ASSESSMENT & PLAN NOTE
Patient started taking vitamin D supplementation.  Advised to take at least 1000 2000 units daily with food.

## 2025-02-04 NOTE — PATIENT INSTRUCTIONS
"Patient Education     Routine physical for adults   The Basics   Written by the doctors and editors at [facility]   What is a physical? -- A physical is a routine visit, or \"check-up,\" with your doctor. You might also hear it called a \"wellness visit\" or \"preventive visit.\"  During each visit, the doctor will:   Ask about your physical and mental health   Ask about your habits, behaviors, and lifestyle   Do an exam   Give you vaccines if needed   Talk to you about any medicines you take   Give advice about your health   Answer your questions  Getting regular check-ups is an important part of taking care of your health. It can help your doctor find and treat any problems you have. But it's also important for preventing health problems.  A routine physical is different from a \"sick visit.\" A sick visit is when you see a doctor because of a health concern or problem. Since physicals are scheduled ahead of time, you can think about what you want to ask the doctor.  How often should I get a physical? -- It depends on your age and health. In general, for people age 21 years and older:   If you are younger than 50 years, you might be able to get a physical every 3 years.   If you are 50 years or older, your doctor might recommend a physical every year.  If you have an ongoing health condition, like diabetes or high blood pressure, your doctor will probably want to see you more often.  What happens during a physical? -- In general, each visit will include:   Physical exam - The doctor or nurse will check your height, weight, heart rate, and blood pressure. They will also look at your eyes and ears. They will ask about how you are feeling and whether you have any symptoms that bother you.   Medicines - It's a good idea to bring a list of all the medicines you take to each doctor visit. Your doctor will talk to you about your medicines and answer any questions. Tell them if you are having any side effects that bother you. You " "should also tell them if you are having trouble paying for any of your medicines.   Habits and behaviors - This includes:   Your diet   Your exercise habits   Whether you smoke, drink alcohol, or use drugs   Whether you are sexually active   Whether you feel safe at home  Your doctor will talk to you about things you can do to improve your health and lower your risk of health problems. They will also offer help and support. For example, if you want to quit smoking, they can give you advice and might prescribe medicines. If you want to improve your diet or get more physical activity, they can help you with this, too.   Lab tests, if needed - The tests you get will depend on your age and situation. For example, your doctor might want to check your:   Cholesterol   Blood sugar   Iron level   Vaccines - The recommended vaccines will depend on your age, health, and what vaccines you already had. Vaccines are very important because they can prevent certain serious or deadly infections.   Discussion of screening - \"Screening\" means checking for diseases or other health problems before they cause symptoms. Your doctor can recommend screening based on your age, risk, and preferences. This might include tests to check for:   Cancer, such as breast, prostate, cervical, ovarian, colorectal, prostate, lung, or skin cancer   Sexually transmitted infections, such as chlamydia and gonorrhea   Mental health conditions like depression and anxiety  Your doctor will talk to you about the different types of screening tests. They can help you decide which screenings to have. They can also explain what the results might mean.   Answering questions - The physical is a good time to ask the doctor or nurse questions about your health. If needed, they can refer you to other doctors or specialists, too.  Adults older than 65 years often need other care, too. As you get older, your doctor will talk to you about:   How to prevent falling at " home   Hearing or vision tests   Memory testing   How to take your medicines safely   Making sure that you have the help and support you need at home  All topics are updated as new evidence becomes available and our peer review process is complete.  This topic retrieved from [employer] on: May 02, 2024.  Topic 635983 Version 1.0  Release: 32.4.3 - C32.122  © 2024 UpToDate, Inc. and/or its affiliates. All rights reserved.  Consumer Information Use and Disclaimer   Disclaimer: This generalized information is a limited summary of diagnosis, treatment, and/or medication information. It is not meant to be comprehensive and should be used as a tool to help the user understand and/or assess potential diagnostic and treatment options. It does NOT include all information about conditions, treatments, medications, side effects, or risks that may apply to a specific patient. It is not intended to be medical advice or a substitute for the medical advice, diagnosis, or treatment of a health care provider based on the health care provider's examination and assessment of a patient's specific and unique circumstances. Patients must speak with a health care provider for complete information about their health, medical questions, and treatment options, including any risks or benefits regarding use of medications. This information does not endorse any treatments or medications as safe, effective, or approved for treating a specific patient. UpToDate, Inc. and its affiliates disclaim any warranty or liability relating to this information or the use thereof.The use of this information is governed by the Terms of Use, available at https://www.woltersMobile365 ([employer])uwer.com/en/know/clinical-effectiveness-terms. 2024© UpToDate, Inc. and its affiliates and/or licensors. All rights reserved.  Copyright   © 2024 UpToDate, Inc. and/or its affiliates. All rights reserved.

## 2025-02-04 NOTE — PROGRESS NOTES
Adult Annual Physical  Name: Elena Blackwell      : 1975      MRN: [id number]  Encounter Provider: EFRAIN Tobar  Encounter Date: 2025   Encounter department: [facility] [address]    Assessment & Plan  Annual physical exam         Eustachian tube dysfunction, right  Advised increase hydration, saline rinses twice daily, moist/humidified air, blowing of nose.  To restart cetirizine and Flonase as previously prescribed.  To return for worsening pain.       Moderate mixed hyperlipidemia not requiring statin therapy  Recent blood work reviewed with patient.  Encouraged a dietary reduction in total cholesterol and saturated fats.  To increase consumption of dietary fiber, complex carbohydrates, and unsaturated fats.  Aerobic exercise is also recommended to help reduce LDL.  Diets like a Mediterranean diet can improve lipid profile.  Discussed use of omega-3 fish oil or red yeast rice.  To repeat blood work in 6 months.    Orders:    Comprehensive metabolic panel; Future    Lipid Panel with Direct LDL reflex; Future    phentermine 30 MG capsule; Take 1 capsule (30 mg total) by mouth every morning    Vitamin D insufficiency  Patient started taking vitamin D supplementation.  Advised to take at least 1000 2000 units daily with food.       Overweight (BMI 25.0-29.9)  Has been tolerating phentermine 15 mg without side effects.  Patient feels medications been effective in curbing appetite.  At this time, will increase to phentermine 30 mg.  Encouraged to engage in healthy food choices, monitoring fat and sugar intake.  To continue with physical activity as tolerated.    Orders:    phentermine 30 MG capsule; Take 1 capsule (30 mg total) by mouth every morning    Screening mammogram for breast cancer    Orders:    Mammo screening bilateral w 3d and cad; Future      Immunizations and preventive care screenings were discussed with patient today. Appropriate  education was printed on patient's after visit summary.    Counseling:  Alcohol/drug use: discussed moderation in alcohol intake, the recommendations for healthy alcohol use, and avoidance of illicit drug use.  Dental Health: discussed importance of regular tooth brushing, flossing, and dental visits.  Injury prevention: discussed safety/seat belts, safety helmets, smoke detectors, carbon monoxide detectors, and smoking near bedding or upholstery.  Sexual health: discussed sexually transmitted diseases, partner selection, use of condoms, avoidance of unintended pregnancy, and contraceptive alternatives.  Exercise: the importance of regular exercise/physical activity was discussed. Recommend exercise 3-5 times per week for at least 30 minutes.       Tobacco Cessation Counseling: Tobacco cessation counseling was not provided. The patient is sincerely urged to quit consumption of tobacco. She is not ready to quit tobacco.         History of Present Illness     Adult Annual Physical:  Patient presents for annual physical.     Diet and Physical Activity:  - Diet/Nutrition: portion control and consuming 3-5 servings of fruits/vegetables daily.  - Exercise: walking and 3-4 times a week on average.    General Health:  - Sleep: sleeps well and 7-8 hours of sleep on average.  - Hearing: normal hearing bilateral ears.  - Vision: most recent eye exam > 1 year ago. wears glasses for reading  - Dental: regular dental visits and brushes teeth twice daily.    /GYN Health:  - Follows with GYN: yes.   - Menopause: postmenopausal.   - History of STDs: no    Advanced Care Planning:  - Has an advanced directive?: no    - Has a durable medical POA?: no    - ACP document given to patient?: no      Review of Systems   Constitutional:  Negative for chills and fever.   HENT:  Positive for ear pain and tinnitus. Negative for congestion, postnasal drip, rhinorrhea, sinus pressure, sinus pain, sore throat and trouble swallowing.    Eyes:   Negative for photophobia and visual disturbance.   Respiratory:  Negative for cough, chest tightness and shortness of breath.    Cardiovascular:  Negative for chest pain and palpitations.   Gastrointestinal:  Negative for abdominal pain, blood in stool, constipation, diarrhea, nausea and vomiting.   Endocrine: Negative for polydipsia, polyphagia and polyuria.   Genitourinary:  Negative for decreased urine volume, dysuria, frequency, hematuria and urgency.   Musculoskeletal:  Negative for arthralgias, back pain and myalgias.   Skin:  Negative for color change and rash.   Neurological:  Negative for dizziness, syncope, weakness, light-headedness and headaches.   Hematological:  Negative for adenopathy. Does not bruise/bleed easily.   Psychiatric/Behavioral:  Negative for confusion, dysphoric mood and sleep disturbance. The patient is not nervous/anxious.    All other systems reviewed and are negative.    Pertinent Medical History         Medical History Reviewed by provider this encounter:  Tobacco  Allergies  Meds  Problems  Med Hx  Surg Hx  Fam Hx  Soc   Hx    .  Past Medical History   Past Medical History:   Diagnosis Date    Abnormal Pap smear of cervix     Disease of thyroid gland     as a teenager    Pneumonia     Shingles 08/2020    Varicella      Past Surgical History:   Procedure Laterality Date    ABDOMINAL ADHESION SURGERY N/A 5/8/2024    Procedure: LYSIS ADHESIONS;  Surgeon: Sujata Kang MD;  Location: [facility];  Service: Gynecology    AUGMENTATION BREAST  2014    AUGMENTATION MAMMAPLASTY Bilateral     FOREIGN BODY REMOVAL Left 11/30/2021    Procedure: NEXPLANON REMOVAL;  Surgeon: Dana Santos MD;  Location: [facility];  Service: Gynecology    OR CYSTOURETHROSCOPY N/A 5/8/2024    Procedure: CYSTOSCOPY;  Surgeon: Sujata Kang MD;  [facility]: [facility];  Service: Gynecology    OR LAPAROSCOPY W/RMVL ADNEXAL STRUCTURES N/A 11/30/2021    Procedure: OPERATIVE LAPAROSCOPY BILATERAL  SALPINGECTOMY;  Surgeon: Dana Santos MD;  Location: [facility];  Service: Gynecology    VA LAPS TOTAL HYSTERECT 250 GM/< W/RMVL TUBE/OVARY Bilateral 5/8/2024    Procedure: LAPAROSCOPIC SUPRACERVICAL HYSTERECTOMY RIGHT OOPHERECTOMY AND EXAM UNDER ANESTHESIA;  Surgeon: Sujata Kang MD;  Location:  MAIN OR;  Service: Gynecology    VA VULVECTOMY SIMPLE PARTIAL Bilateral 01/09/2020    Procedure: SIMPLE VULVECTOMY;  Surgeon: Gadiel Christensen MD;  [facility]: [facility];  Service: Gynecology Oncology    TUBAL LIGATION       Family History   Problem Relation Age of Onset    Hypertension Mother     Hypertension Father     No Known Problems Daughter     No Known Problems Son     Breast cancer Paternal Aunt     Ovarian cancer Neg Hx     Colon cancer Neg Hx       reports that she has been smoking cigarettes. She has a 25 pack-year smoking history. She has never used smokeless tobacco. She reports that she does not currently use alcohol. She reports that she does not use drugs.  Current Outpatient Medications on File Prior to Visit   Medication Sig Dispense Refill    cetirizine (ZyrTEC) 10 mg tablet 10 mg daily      fluticasone (FLONASE) 50 mcg/act nasal spray INSTILL 1 SPRAY IN EACH NOSTRIL ONCE DAILY 16 mL 1    multivitamin (THERAGRAN) TABS Take 1 tablet by mouth daily      [DISCONTINUED] phentermine 15 MG capsule Take 1 capsule (15 mg total) by mouth every morning 30 capsule 0    [DISCONTINUED] methylPREDNISolone 4 MG tablet therapy pack Use as directed on package (Patient not taking: Reported on 2/4/2025) 21 each 0     No current facility-administered medications on file prior to visit.   No Known Allergies   Current Outpatient Medications on File Prior to Visit   Medication Sig Dispense Refill    cetirizine (ZyrTEC) 10 mg tablet 10 mg daily      fluticasone (FLONASE) 50 mcg/act nasal spray INSTILL 1 SPRAY IN EACH NOSTRIL ONCE DAILY 16 mL 1    multivitamin (THERAGRAN) TABS Take 1 tablet by mouth daily       "[DISCONTINUED] phentermine 15 MG capsule Take 1 capsule (15 mg total) by mouth every morning 30 capsule 0    [DISCONTINUED] methylPREDNISolone 4 MG tablet therapy pack Use as directed on package (Patient not taking: Reported on 2/4/2025) 21 each 0     No current facility-administered medications on file prior to visit.      Social History     Tobacco Use    Smoking status: Every Day     Current packs/day: 1.00     Average packs/day: 1 pack/day for 25.0 years (25.0 ttl pk-yrs)     Types: Cigarettes    Smokeless tobacco: Never    Tobacco comments:     15 cigs per day   Vaping Use    Vaping status: Never Used   Substance and Sexual Activity    Alcohol use: Not Currently    Drug use: Never    Sexual activity: Yes     Partners: Male     Birth control/protection: None, Female Sterilization     Comment: tubal        Objective   /70   Pulse 88   Temp 97.6 °F (36.4 °C)   Resp 12   Ht 5' 2\" (1.575 m)   Wt 72 kg (158 lb 12.8 oz)   LMP 02/20/2024   SpO2 95%   BMI 29.04 kg/m²     Physical Exam  Vitals reviewed.   Constitutional:       General: She is not in acute distress.     Appearance: Normal appearance. She is well-developed. She is not ill-appearing.   HENT:      Head: Normocephalic and atraumatic.      Right Ear: Tympanic membrane, ear canal and external ear normal.      Left Ear: Tympanic membrane, ear canal and external ear normal.      Nose: Nose normal.      Mouth/Throat:      Mouth: Mucous membranes are moist.      Pharynx: Oropharynx is clear.   Eyes:      Extraocular Movements: Extraocular movements intact.      Conjunctiva/sclera: Conjunctivae normal.      Pupils: Pupils are equal, round, and reactive to light.   Cardiovascular:      Rate and Rhythm: Normal rate and regular rhythm.      Pulses: Normal pulses.      Heart sounds: Normal heart sounds. No murmur heard.  Pulmonary:      Effort: Pulmonary effort is normal. No respiratory distress.      Breath sounds: Normal breath sounds.   Abdominal:      " General: Bowel sounds are normal.      Palpations: Abdomen is soft.      Tenderness: There is no abdominal tenderness. There is no right CVA tenderness or left CVA tenderness.   Musculoskeletal:         General: No swelling. Normal range of motion.      Cervical back: Normal range of motion and neck supple.      Right lower leg: No edema.      Left lower leg: No edema.   Lymphadenopathy:      Cervical: No cervical adenopathy.   Skin:     General: Skin is warm and dry.      Capillary Refill: Capillary refill takes less than 2 seconds.   Neurological:      General: No focal deficit present.      Mental Status: She is alert and oriented to person, place, and time.   Psychiatric:         Mood and Affect: Mood normal.         Behavior: Behavior normal.

## 2025-03-04 DIAGNOSIS — H65.191 ACUTE EFFUSION OF RIGHT EAR: ICD-10-CM

## 2025-03-04 DIAGNOSIS — E78.2 MODERATE MIXED HYPERLIPIDEMIA NOT REQUIRING STATIN THERAPY: ICD-10-CM

## 2025-03-04 DIAGNOSIS — E66.3 OVERWEIGHT (BMI 25.0-29.9): ICD-10-CM

## 2025-03-05 RX ORDER — FLUTICASONE PROPIONATE 50 MCG
1 SPRAY, SUSPENSION (ML) NASAL DAILY
Qty: 16 ML | Refills: 1 | Status: SHIPPED | OUTPATIENT
Start: 2025-03-05

## 2025-03-05 RX ORDER — PHENTERMINE HYDROCHLORIDE 30 MG/1
30 CAPSULE ORAL EVERY MORNING
Qty: 30 CAPSULE | Refills: 0 | Status: SHIPPED | OUTPATIENT
Start: 2025-03-05

## 2025-03-25 ENCOUNTER — ANNUAL EXAM (OUTPATIENT)
Age: 50
End: 2025-03-25
Payer: COMMERCIAL

## 2025-03-25 VITALS
HEIGHT: 62 IN | WEIGHT: 156 LBS | SYSTOLIC BLOOD PRESSURE: 120 MMHG | DIASTOLIC BLOOD PRESSURE: 80 MMHG | BODY MASS INDEX: 28.71 KG/M2

## 2025-03-25 DIAGNOSIS — Z01.419 ENCOUNTER FOR GYNECOLOGICAL EXAMINATION (GENERAL) (ROUTINE) WITHOUT ABNORMAL FINDINGS: Primary | ICD-10-CM

## 2025-03-25 PROCEDURE — 99396 PREV VISIT EST AGE 40-64: CPT | Performed by: STUDENT IN AN ORGANIZED HEALTH CARE EDUCATION/TRAINING PROGRAM

## 2025-03-25 NOTE — PROGRESS NOTES
Elena Blackwell   1975    CC:  Yearly exam    A:  Yearly exam.     Problem List Items Addressed This Visit    None  Visit Diagnoses         Encounter for gynecological examination (general) (routine) without abnormal findings    -  Primary            P:   Pap up to date, due . Will need q3 year screening  Mammo slip given and reminded of screening   Colonoscopy due     RTO one year for yearly exam or sooner as needed.      S:  49 y.o. female here for yearly exam. She is s/p supracervical hysterectomy for AUB and has had no vaginal bleeding.  She denies vaginal discharge, itching, odor or dryness.     Sexual activity: She is sexually active without pain, bleeding or dryness.     STD testing: She does not want STD testing today.       Last Pap: 2023 NILM/HPV -  Colpo CIN1   NILM/HPV Neg    NILM/HPV Neg   NILM/HPV Neg     Last Mammo: 2023 BIRADS 1  Last Colonoscopy: 2023, 5yr recall     Current smoker, social drinker  Exercises irregularly    Family hx of breast cancer: Pat Aunt   Family hx of ovarian cancer: denies   Family hx of colon cancer: denies       Current Outpatient Medications:     cetirizine (ZyrTEC) 10 mg tablet, 10 mg daily, Disp: , Rfl:     fluticasone (FLONASE) 50 mcg/act nasal spray, 1 spray into each nostril daily, Disp: 16 mL, Rfl: 1    multivitamin (THERAGRAN) TABS, Take 1 tablet by mouth daily, Disp: , Rfl:     phentermine 30 MG capsule, Take 1 capsule (30 mg total) by mouth every morning, Disp: 30 capsule, Rfl: 0  Social History     Socioeconomic History    Marital status: /Civil Union     Spouse name: Not on file    Number of children: Not on file    Years of education: Not on file    Highest education level: Not on file   Occupational History    Not on file   Tobacco Use    Smoking status: Every Day     Current packs/day: 1.00     Average packs/day: 1 pack/day for 25.0 years (25.0 ttl pk-yrs)     Types: Cigarettes    Smokeless tobacco:  "Never    Tobacco comments:     15 cigs per day   Vaping Use    Vaping status: Never Used   Substance and Sexual Activity    Alcohol use: Not Currently    Drug use: Never    Sexual activity: Yes     Partners: Male     Birth control/protection: None, Female Sterilization     Comment: tubal    Other Topics Concern    Not on file   Social History Narrative    Works full-time        2 children     Social Drivers of Health     Financial Resource Strain: Not on file   Food Insecurity: Not on file   Transportation Needs: Not on file   Physical Activity: Not on file   Stress: Not on file   Social Connections: Unknown (6/18/2024)    Received from [employer]     How often do you feel lonely or isolated from those around you? (Adult - for ages 18 years and over): Not on file   Intimate Partner Violence: Not on file   Housing Stability: Not on file     Family History   Problem Relation Age of Onset    Hypertension Mother     Hypertension Father     No Known Problems Daughter     No Known Problems Son     Breast cancer Paternal Aunt     Ovarian cancer Neg Hx     Colon cancer Neg Hx      Past Medical History:   Diagnosis Date    Abnormal Pap smear of cervix     Disease of thyroid gland     as a teenager    Ear problems 12.28.2022    Headache(784.0)     Otitis media 12.28.2022    Pneumonia     Shingles 08/2020    Varicella         Review of Systems   Respiratory: Negative.    Cardiovascular: Negative.    Gastrointestinal: Negative for constipation and diarrhea.   Genitourinary: Negative for difficulty urinating, pelvic pain, vaginal bleeding, vaginal discharge, itching or odor.    O:  Blood pressure 120/80, height 5' 2\" (1.575 m), weight 70.8 kg (156 lb), last menstrual period 02/20/2024, not currently breastfeeding.    Patient appears well and is not in distress  Neck is supple without masses  Breasts are symmetrical without mass, tenderness, nipple discharge, skin changes or adenopathy.   Abdomen is " soft and nontender without masses.   Vulva without lesions or rashes. S/p partial vulvectomy.  Urethral meatus and urethra are normal  Bladder is normal to palpation  Vagina is normal without discharge or bleeding.   Cervix normal, without lesion or discharge  Uterus surgically absent, adnexa without masses

## 2025-04-02 DIAGNOSIS — E78.2 MODERATE MIXED HYPERLIPIDEMIA NOT REQUIRING STATIN THERAPY: ICD-10-CM

## 2025-04-02 DIAGNOSIS — E66.3 OVERWEIGHT (BMI 25.0-29.9): ICD-10-CM

## 2025-04-03 RX ORDER — PHENTERMINE HYDROCHLORIDE 37.5 MG/1
37.5 CAPSULE ORAL EVERY MORNING
Qty: 30 CAPSULE | Refills: 0 | Status: SHIPPED | OUTPATIENT
Start: 2025-04-03

## 2025-05-05 DIAGNOSIS — E66.3 OVERWEIGHT (BMI 25.0-29.9): ICD-10-CM

## 2025-05-05 DIAGNOSIS — E78.2 MODERATE MIXED HYPERLIPIDEMIA NOT REQUIRING STATIN THERAPY: ICD-10-CM

## 2025-05-06 RX ORDER — PHENTERMINE HYDROCHLORIDE 37.5 MG/1
37.5 CAPSULE ORAL EVERY MORNING
Qty: 30 CAPSULE | Refills: 0 | Status: SHIPPED | OUTPATIENT
Start: 2025-05-06

## 2025-05-29 DIAGNOSIS — H65.191 ACUTE EFFUSION OF RIGHT EAR: ICD-10-CM

## 2025-05-30 RX ORDER — FLUTICASONE PROPIONATE 50 MCG
SPRAY, SUSPENSION (ML) NASAL
Qty: 16 G | Refills: 1 | Status: SHIPPED | OUTPATIENT
Start: 2025-05-30

## 2025-06-03 DIAGNOSIS — E66.3 OVERWEIGHT (BMI 25.0-29.9): ICD-10-CM

## 2025-06-03 DIAGNOSIS — E78.2 MODERATE MIXED HYPERLIPIDEMIA NOT REQUIRING STATIN THERAPY: ICD-10-CM

## 2025-06-03 RX ORDER — PHENTERMINE HYDROCHLORIDE 37.5 MG/1
37.5 CAPSULE ORAL EVERY MORNING
Qty: 30 CAPSULE | Refills: 0 | Status: SHIPPED | OUTPATIENT
Start: 2025-06-03

## 2025-06-24 ENCOUNTER — OFFICE VISIT (OUTPATIENT)
Dept: FAMILY MEDICINE CLINIC | Facility: CLINIC | Age: 50
End: 2025-06-24
Payer: COMMERCIAL

## 2025-06-24 VITALS
DIASTOLIC BLOOD PRESSURE: 78 MMHG | HEIGHT: 62 IN | SYSTOLIC BLOOD PRESSURE: 120 MMHG | BODY MASS INDEX: 27.42 KG/M2 | WEIGHT: 149 LBS | HEART RATE: 98 BPM | OXYGEN SATURATION: 97 %

## 2025-06-24 DIAGNOSIS — E04.2 MULTIPLE THYROID NODULES: ICD-10-CM

## 2025-06-24 DIAGNOSIS — M77.11 LATERAL EPICONDYLITIS OF RIGHT ELBOW: Primary | ICD-10-CM

## 2025-06-24 DIAGNOSIS — M25.521 RIGHT ELBOW PAIN: ICD-10-CM

## 2025-06-24 DIAGNOSIS — B35.1 ONYCHOMYCOSIS: ICD-10-CM

## 2025-06-24 PROCEDURE — 99214 OFFICE O/P EST MOD 30 MIN: CPT | Performed by: NURSE PRACTITIONER

## 2025-06-24 RX ORDER — CICLOPIROX 80 MG/ML
SOLUTION TOPICAL
Qty: 6 ML | Refills: 1 | Status: SHIPPED | OUTPATIENT
Start: 2025-06-24

## 2025-06-24 RX ORDER — NAPROXEN 500 MG/1
500 TABLET ORAL 2 TIMES DAILY WITH MEALS
Qty: 60 TABLET | Refills: 0 | Status: SHIPPED | OUTPATIENT
Start: 2025-06-24

## 2025-06-24 NOTE — PROGRESS NOTES
Name: Elena Blackwell      : 1975      MRN: [id number]  Encounter Provider: EFRAIN Tobar  Encounter Date: 2025   Encounter department: [facility] [address] [facility]  :  Assessment & Plan  Lateral epicondylitis of right elbow  Advised splinting, range of motion stretches/exercises, rest.  Take naproxen as prescribed for the next 5 to 7 days.  Advised to follow-up in office if conservative measures not effective.    Orders:    naproxen (Naprosyn) 500 mg tablet; Take 1 tablet (500 mg total) by mouth 2 (two) times a day with meals    Right elbow pain  x-ray as ordered.  Advised range of motion exercises.  Naproxen as prescribed.    Orders:    XR elbow 3+ vw right; Future    Onychomycosis  Ice to keep toes clean and dry, to avoid prolonged exposure to moisture.  Ciclopirox solution as prescribed    Orders:    ciclopirox (PENLAC) 8 % solution; Apply topically daily at bedtime    Multiple thyroid nodules  She was noted to have multiple thyroid nodules that needed biopsy last year.  Had biopsy scheduled, but did not follow through and canceled appointment.  Advised patient to obtain ultrasound to see status of nodules.    Orders:    US thyroid; Future           History of Present Illness   Patient presents the office for evaluation of right arm pain.  Patient, symptoms began 3 months ago with pain in the forearm.  Patient is right-hand dominant.  Patient denies recent fall or injury.  States over the past month, pain has increased in the elbow region.  Pain down right arm and has intermittent pins-and-needles in her fingers.  Pain is worse when lifting objects and when bending right elbow.  Denies weakness in right arm.        Review of Systems   Constitutional:  Negative for activity change, appetite change and fever.   HENT:  Negative for sore throat.    Eyes:  Negative for photophobia and visual disturbance.   Respiratory:  Negative for cough and shortness  "of breath.    Cardiovascular:  Negative for chest pain and palpitations.   Gastrointestinal:  Negative for abdominal pain, nausea and vomiting.   Genitourinary:  Negative for decreased urine volume.   Musculoskeletal:  Positive for arthralgias. Negative for joint swelling and neck pain.   Skin:  Negative for color change and rash.   Neurological:  Negative for dizziness, light-headedness, numbness and headaches.   Hematological:  Negative for adenopathy.   Psychiatric/Behavioral:  Negative for dysphoric mood. The patient is not nervous/anxious.        Objective   /78   Pulse 98   Ht 5' 2\" (1.575 m)   Wt 67.6 kg (149 lb)   LMP 02/20/2024   SpO2 97%   BMI 27.25 kg/m²      Physical Exam  Vitals reviewed.   Constitutional:       General: She is not in acute distress.     Appearance: Normal appearance. She is not ill-appearing.   HENT:      Head: Normocephalic and atraumatic.      Right Ear: External ear normal.      Left Ear: External ear normal.      Nose: Nose normal.      Mouth/Throat:      Mouth: Mucous membranes are moist.      Pharynx: Oropharynx is clear.     Eyes:      Pupils: Pupils are equal, round, and reactive to light.     Neck:      Thyroid: No thyroid mass or thyromegaly.     Cardiovascular:      Rate and Rhythm: Normal rate and regular rhythm.      Pulses: Normal pulses.      Heart sounds: Normal heart sounds.   Pulmonary:      Effort: Pulmonary effort is normal.      Breath sounds: Normal breath sounds.     Musculoskeletal:      Right elbow: Decreased range of motion. Tenderness present in lateral epicondyle.      Right forearm: No deformity, tenderness or bony tenderness.      Left forearm: Normal.      Right hand: Normal strength. Normal sensation. There is no disruption of two-point discrimination.      Left hand: Normal.      Cervical back: Normal range of motion and neck supple.   Feet:      Right foot:      Skin integrity: Skin integrity normal.      Toenail Condition: Right toenails " are normal.      Left foot:      Skin integrity: Skin integrity normal.      Toenail Condition: Fungal disease present.    Skin:     General: Skin is warm and dry.     Neurological:      General: No focal deficit present.      Mental Status: She is alert and oriented to person, place, and time.     Psychiatric:         Mood and Affect: Mood normal.         Behavior: Behavior normal.

## 2025-07-07 DIAGNOSIS — E66.3 OVERWEIGHT (BMI 25.0-29.9): ICD-10-CM

## 2025-07-07 DIAGNOSIS — E78.2 MODERATE MIXED HYPERLIPIDEMIA NOT REQUIRING STATIN THERAPY: ICD-10-CM

## 2025-07-08 RX ORDER — PHENTERMINE HYDROCHLORIDE 37.5 MG/1
37.5 CAPSULE ORAL EVERY MORNING
Qty: 30 CAPSULE | Refills: 0 | Status: SHIPPED | OUTPATIENT
Start: 2025-07-08

## 2025-07-11 ENCOUNTER — APPOINTMENT (OUTPATIENT)
Dept: RADIOLOGY | Facility: CLINIC | Age: 50
End: 2025-07-11
Payer: COMMERCIAL

## 2025-07-11 DIAGNOSIS — M25.521 RIGHT ELBOW PAIN: ICD-10-CM

## 2025-07-11 PROCEDURE — 73080 X-RAY EXAM OF ELBOW: CPT

## 2025-07-14 ENCOUNTER — RESULTS FOLLOW-UP (OUTPATIENT)
Dept: FAMILY MEDICINE CLINIC | Facility: CLINIC | Age: 50
End: 2025-07-14

## 2025-07-14 DIAGNOSIS — E04.2 MULTIPLE THYROID NODULES: Primary | ICD-10-CM

## 2025-07-17 ENCOUNTER — HOSPITAL ENCOUNTER (OUTPATIENT)
Dept: ULTRASOUND IMAGING | Facility: HOSPITAL | Age: 50
End: 2025-07-17
Payer: COMMERCIAL

## 2025-07-17 DIAGNOSIS — E04.2 MULTIPLE THYROID NODULES: ICD-10-CM

## 2025-07-17 PROCEDURE — 76536 US EXAM OF HEAD AND NECK: CPT

## 2025-07-31 DIAGNOSIS — M77.11 LATERAL EPICONDYLITIS OF RIGHT ELBOW: ICD-10-CM

## 2025-07-31 RX ORDER — NAPROXEN 500 MG/1
500 TABLET ORAL 2 TIMES DAILY WITH MEALS
Qty: 60 TABLET | Refills: 0 | Status: SHIPPED | OUTPATIENT
Start: 2025-07-31

## 2025-08-05 DIAGNOSIS — E66.3 OVERWEIGHT (BMI 25.0-29.9): ICD-10-CM

## 2025-08-05 DIAGNOSIS — H65.191 ACUTE EFFUSION OF RIGHT EAR: ICD-10-CM

## 2025-08-05 DIAGNOSIS — E78.2 MODERATE MIXED HYPERLIPIDEMIA NOT REQUIRING STATIN THERAPY: ICD-10-CM

## 2025-08-07 ENCOUNTER — APPOINTMENT (OUTPATIENT)
Dept: LAB | Facility: CLINIC | Age: 50
End: 2025-08-07
Payer: COMMERCIAL

## 2025-08-07 RX ORDER — PHENTERMINE HYDROCHLORIDE 37.5 MG/1
37.5 CAPSULE ORAL EVERY MORNING
Qty: 30 CAPSULE | Refills: 0 | Status: SHIPPED | OUTPATIENT
Start: 2025-08-07

## 2025-08-07 RX ORDER — FLUTICASONE PROPIONATE 50 MCG
1 SPRAY, SUSPENSION (ML) NASAL DAILY
Qty: 16 G | Refills: 5 | Status: SHIPPED | OUTPATIENT
Start: 2025-08-07

## 2025-08-12 ENCOUNTER — OFFICE VISIT (OUTPATIENT)
Dept: FAMILY MEDICINE CLINIC | Facility: CLINIC | Age: 50
End: 2025-08-12
Payer: COMMERCIAL

## (undated) DEVICE — BETHLEHEM UNIVERSAL GYN LAP PK: Brand: CARDINAL HEALTH

## (undated) DEVICE — PLUMEPEN PRO 10FT

## (undated) DEVICE — INTENDED FOR TISSUE SEPARATION, AND OTHER PROCEDURES THAT REQUIRE A SHARP SURGICAL BLADE TO PUNCTURE OR CUT.: Brand: BARD-PARKER SAFETY BLADES SIZE 11, STERILE

## (undated) DEVICE — CYSTO TUBING SINGLE IRRIGATION

## (undated) DEVICE — MAYO STAND COVER: Brand: CONVERTORS

## (undated) DEVICE — TROCAR: Brand: KII FIOS FIRST ENTRY

## (undated) DEVICE — X-RAY DETECTABLE SPONGES,16 PLY: Brand: VISTEC

## (undated) DEVICE — CHLORAPREP HI-LITE 26ML ORANGE

## (undated) DEVICE — BETHLEHEM UNIVERSAL MINOR VAG: Brand: CARDINAL HEALTH

## (undated) DEVICE — SUT STRATAFIX SPIRAL 2-0 CT-1 30 CM SXPP1B410

## (undated) DEVICE — STRL ALLENTOWN HYSTEROSCOPY PK: Brand: CARDINAL HEALTH

## (undated) DEVICE — LUBRICANT JELLY SURGILUBE TUBE 4OZ FLIP TOP

## (undated) DEVICE — GLOVE INDICATOR UNDERGLOVE SZ 6 BLUE

## (undated) DEVICE — INTENDED FOR TISSUE SEPARATION, AND OTHER PROCEDURES THAT REQUIRE A SHARP SURGICAL BLADE TO PUNCTURE OR CUT.: Brand: BARD-PARKER ® CARBON RIB-BACK BLADES

## (undated) DEVICE — GLOVE SRG LF STRL BGL SKNSNS 6 PF

## (undated) DEVICE — SUT VICRYL 2-0 SH 27 IN UNDYED J417H

## (undated) DEVICE — ELECTRODE LAP SPATULA CRV E-Z CLEAN 33CM -0018C

## (undated) DEVICE — DRAPE SHEET THREE QUARTER

## (undated) DEVICE — INSUFLATION TUBING INSUFLOW (LEXION)

## (undated) DEVICE — CHLORHEXIDINE 4PCT 4 OZ

## (undated) DEVICE — Device

## (undated) DEVICE — SYRINGE BULB 2 OZ

## (undated) DEVICE — NEEDLE 25G X 1 1/2

## (undated) DEVICE — ADHESIVE SKIN HIGH VISCOSITY EXOFIN 1ML

## (undated) DEVICE — VIAL DECANTER

## (undated) DEVICE — HARMONIC ACE +7 LAPAROSCOPIC SHEARS ADVANCED HEMOSTASIS 5MM DIAMETER 36CM SHAFT LENGTH  FOR USE WITH GRAY HAND PIECE ONLY: Brand: HARMONIC ACE

## (undated) DEVICE — 5 MM CURVED DISSECTORS WITH MONOPOLAR CAUTERY: Brand: ENDOPATH

## (undated) DEVICE — TROCAR: Brand: KII SLEEVE

## (undated) DEVICE — SYRINGE 50ML LL

## (undated) DEVICE — DISPOSABLE BRIEF/UNDERWEAR

## (undated) DEVICE — GLOVE PI ULTRA TOUCH SZ.7.5

## (undated) DEVICE — GLOVE INDICATOR PI UNDERGLOVE SZ 7.5 BLUE

## (undated) DEVICE — PENCIL ELECTROSURG E-Z CLEAN -0035H

## (undated) DEVICE — SUT MONOCRYL 4-0 PS-2 27 IN Y426H

## (undated) DEVICE — [HIGH FLOW INSUFFLATOR,  DO NOT USE IF PACKAGE IS DAMAGED,  KEEP DRY,  KEEP AWAY FROM SUNLIGHT,  PROTECT FROM HEAT AND RADIOACTIVE SOURCES.]: Brand: PNEUMOSURE

## (undated) DEVICE — LIGHT HANDLE COVER SLEEVE DISP BLUE STELLAR

## (undated) DEVICE — HARMONIC 1100 SHEARS, 36CM SHAFT LENGTH: Brand: HARMONIC

## (undated) DEVICE — UTERINE MANIPULATOR RUMI 6.7 X 8 CM

## (undated) DEVICE — MAXI PAD5.51 X 13.78 IN. (14.0 X 35.0 CM)HEAVYCONTOUREDUNSCENTED: Brand: CURITY

## (undated) DEVICE — SUT VICRYL 3-0 SH 27 IN J416H

## (undated) DEVICE — SUT VICRYL 0 UR-6 27 IN J603H

## (undated) DEVICE — GLOVE SRG LF STRL BGL SKNSNS 6.5 PF

## (undated) DEVICE — 1820 FOAM BLOCK NEEDLE COUNTER: Brand: DEVON

## (undated) DEVICE — GLOVE INDICATOR PI UNDERGLOVE SZ 6.5 BLUE

## (undated) DEVICE — PVC URETHRAL CATHETER: Brand: DOVER

## (undated) DEVICE — 2, DISPOSABLE SUCTION/IRRIGATOR WITHOUT DISPOSABLE TIP: Brand: STRYKEFLOW

## (undated) DEVICE — TRAY FOLEY 16FR URIMETER SILICONE SURESTEP

## (undated) DEVICE — PREMIUM DRY TRAY LF: Brand: MEDLINE INDUSTRIES, INC.

## (undated) DEVICE — ENDOPATH BIPOLAR FORCEPS WITH MACRO JAW: Brand: ENDOPATH

## (undated) DEVICE — SURGIFLO ENDOSCOPIC APPICATOR: Brand: ETHICON

## (undated) DEVICE — ENDOPATH PNEUMONEEDLE INSUFFLATION NEEDLES WITH LUER LOCK CONNECTORS 120MM: Brand: ENDOPATH

## (undated) DEVICE — HEMOSTATIC MATRIX SURGIFLO 8ML W/THROMBIN

## (undated) DEVICE — INSUFFLATION NEEDLE TO ESTABLISH PNEUMOPERITONEUM.: Brand: INSUFFLATION NEEDLE

## (undated) DEVICE — SYRINGE 10ML LL

## (undated) DEVICE — TUBING SMOKE EVAC W/FILTRATION DEVICE PLUMEPORT ACTIV

## (undated) DEVICE — DRAPE EQUIPMENT RF WAND

## (undated) DEVICE — TROCAR: Brand: KII® SLEEVE

## (undated) DEVICE — SKIN MARKER DUAL TIP WITH RULER CAP, FLEXIBLE RULER AND LABELS: Brand: DEVON